# Patient Record
Sex: MALE | Race: ASIAN | NOT HISPANIC OR LATINO | URBAN - METROPOLITAN AREA
[De-identification: names, ages, dates, MRNs, and addresses within clinical notes are randomized per-mention and may not be internally consistent; named-entity substitution may affect disease eponyms.]

---

## 2021-08-03 ENCOUNTER — EMERGENCY (EMERGENCY)
Facility: HOSPITAL | Age: 61
LOS: 1 days | Discharge: HOME | End: 2021-08-03
Attending: STUDENT IN AN ORGANIZED HEALTH CARE EDUCATION/TRAINING PROGRAM | Admitting: STUDENT IN AN ORGANIZED HEALTH CARE EDUCATION/TRAINING PROGRAM
Payer: SELF-PAY

## 2021-08-03 VITALS
OXYGEN SATURATION: 100 % | HEART RATE: 104 BPM | TEMPERATURE: 99 F | SYSTOLIC BLOOD PRESSURE: 116 MMHG | RESPIRATION RATE: 16 BRPM | DIASTOLIC BLOOD PRESSURE: 65 MMHG

## 2021-08-03 VITALS
RESPIRATION RATE: 16 BRPM | DIASTOLIC BLOOD PRESSURE: 76 MMHG | HEART RATE: 95 BPM | SYSTOLIC BLOOD PRESSURE: 125 MMHG | TEMPERATURE: 98 F | OXYGEN SATURATION: 100 %

## 2021-08-03 LAB
ALBUMIN SERPL ELPH-MCNC: 3.4 G/DL — SIGNIFICANT CHANGE UP (ref 3.3–5)
ALP SERPL-CCNC: 75 U/L — SIGNIFICANT CHANGE UP (ref 40–120)
ALT FLD-CCNC: 162 U/L — HIGH (ref 4–41)
ANION GAP SERPL CALC-SCNC: 11 MMOL/L — SIGNIFICANT CHANGE UP (ref 7–14)
AST SERPL-CCNC: 100 U/L — HIGH (ref 4–40)
BASOPHILS # BLD AUTO: 0.04 K/UL — SIGNIFICANT CHANGE UP (ref 0–0.2)
BASOPHILS NFR BLD AUTO: 0.4 % — SIGNIFICANT CHANGE UP (ref 0–2)
BILIRUB SERPL-MCNC: 0.3 MG/DL — SIGNIFICANT CHANGE UP (ref 0.2–1.2)
BUN SERPL-MCNC: 10 MG/DL — SIGNIFICANT CHANGE UP (ref 7–23)
CALCIUM SERPL-MCNC: 9.2 MG/DL — SIGNIFICANT CHANGE UP (ref 8.4–10.5)
CHLORIDE SERPL-SCNC: 97 MMOL/L — LOW (ref 98–107)
CK SERPL-CCNC: 3193 U/L — HIGH (ref 30–200)
CO2 SERPL-SCNC: 26 MMOL/L — SIGNIFICANT CHANGE UP (ref 22–31)
CREAT SERPL-MCNC: 0.69 MG/DL — SIGNIFICANT CHANGE UP (ref 0.5–1.3)
EOSINOPHIL # BLD AUTO: 0.21 K/UL — SIGNIFICANT CHANGE UP (ref 0–0.5)
EOSINOPHIL NFR BLD AUTO: 2.1 % — SIGNIFICANT CHANGE UP (ref 0–6)
GLUCOSE SERPL-MCNC: 78 MG/DL — SIGNIFICANT CHANGE UP (ref 70–99)
HCT VFR BLD CALC: 35.5 % — LOW (ref 39–50)
HGB BLD-MCNC: 11.1 G/DL — LOW (ref 13–17)
IANC: 6.42 K/UL — SIGNIFICANT CHANGE UP (ref 1.5–8.5)
IMM GRANULOCYTES NFR BLD AUTO: 0.4 % — SIGNIFICANT CHANGE UP (ref 0–1.5)
LYMPHOCYTES # BLD AUTO: 2.71 K/UL — SIGNIFICANT CHANGE UP (ref 1–3.3)
LYMPHOCYTES # BLD AUTO: 26.9 % — SIGNIFICANT CHANGE UP (ref 13–44)
MAGNESIUM SERPL-MCNC: 1.8 MG/DL — SIGNIFICANT CHANGE UP (ref 1.6–2.6)
MCHC RBC-ENTMCNC: 27.5 PG — SIGNIFICANT CHANGE UP (ref 27–34)
MCHC RBC-ENTMCNC: 31.3 GM/DL — LOW (ref 32–36)
MCV RBC AUTO: 88.1 FL — SIGNIFICANT CHANGE UP (ref 80–100)
MONOCYTES # BLD AUTO: 0.66 K/UL — SIGNIFICANT CHANGE UP (ref 0–0.9)
MONOCYTES NFR BLD AUTO: 6.5 % — SIGNIFICANT CHANGE UP (ref 2–14)
NEUTROPHILS # BLD AUTO: 6.42 K/UL — SIGNIFICANT CHANGE UP (ref 1.8–7.4)
NEUTROPHILS NFR BLD AUTO: 63.7 % — SIGNIFICANT CHANGE UP (ref 43–77)
NRBC # BLD: 0 /100 WBCS — SIGNIFICANT CHANGE UP
NRBC # FLD: 0 K/UL — SIGNIFICANT CHANGE UP
PHOSPHATE SERPL-MCNC: 4.1 MG/DL — SIGNIFICANT CHANGE UP (ref 2.5–4.5)
PLATELET # BLD AUTO: 296 K/UL — SIGNIFICANT CHANGE UP (ref 150–400)
POTASSIUM SERPL-MCNC: 5 MMOL/L — SIGNIFICANT CHANGE UP (ref 3.5–5.3)
POTASSIUM SERPL-SCNC: 5 MMOL/L — SIGNIFICANT CHANGE UP (ref 3.5–5.3)
PROT SERPL-MCNC: 7.1 G/DL — SIGNIFICANT CHANGE UP (ref 6–8.3)
RBC # BLD: 4.03 M/UL — LOW (ref 4.2–5.8)
RBC # FLD: 13.1 % — SIGNIFICANT CHANGE UP (ref 10.3–14.5)
SODIUM SERPL-SCNC: 134 MMOL/L — LOW (ref 135–145)
TSH SERPL-MCNC: 3.39 UIU/ML — SIGNIFICANT CHANGE UP (ref 0.27–4.2)
WBC # BLD: 10.08 K/UL — SIGNIFICANT CHANGE UP (ref 3.8–10.5)
WBC # FLD AUTO: 10.08 K/UL — SIGNIFICANT CHANGE UP (ref 3.8–10.5)

## 2021-08-03 PROCEDURE — 99284 EMERGENCY DEPT VISIT MOD MDM: CPT | Mod: 25

## 2021-08-03 PROCEDURE — 93010 ELECTROCARDIOGRAM REPORT: CPT

## 2021-08-03 RX ORDER — SODIUM CHLORIDE 9 MG/ML
1000 INJECTION INTRAMUSCULAR; INTRAVENOUS; SUBCUTANEOUS ONCE
Refills: 0 | Status: COMPLETED | OUTPATIENT
Start: 2021-08-03 | End: 2021-08-03

## 2021-08-03 RX ADMIN — SODIUM CHLORIDE 1000 MILLILITER(S): 9 INJECTION INTRAMUSCULAR; INTRAVENOUS; SUBCUTANEOUS at 12:58

## 2021-08-03 NOTE — ED PROVIDER NOTE - CLINICAL SUMMARY MEDICAL DECISION MAKING FREE TEXT BOX
Shameem:     Concern for weight loss, hx of rhabo and weakness. CT abdomen may 2021 neg fo malignancy. Will order basic labs- cbc, cmp, cpk, tsh, mag, phos and ekg.

## 2021-08-03 NOTE — ED PROVIDER NOTE - ATTENDING CONTRIBUTION TO CARE
I have personally performed a history and physical examination of the patient and discussed management with the resident as well as the patient.  I reviewed the resident's note and agree with the documented findings and plan of care.  I have authored and modified critical sections of the Provider Note, including but not limited to HPI, Physical Exam and MDM.    59 yo M with a PMH of IDDM presents to the ED with weight loss and weakness that has been persistent over the last 3 months with associated anorexia. He endorses hunger but states that he cannot eat without further elaborating his symptoms. Approximately 35 lbs weight loss that he was evaluated for these symptoms at another hospital, including presented results for an unremarkable CT PE, CT abd/pelvis with con, and MRI spine, as well as blood work revealing an elevated CPK. Pt was discharged after multiple specialist evaluation at that time with stated "no diagnosis." Pt has not follow up with his outpatient therapy as of this time. He was recommended an outpatient colonoscopy and egd. No other current complaints at this time.    ROS   GENERAL: No fever, chills, +malaise  ENT No earache, coryza, sore throat   NECK: No stiffness swollen glands or dysphagia   RESPIRATORY: No cough, dyspnea, pleuritic chest pain   HEART: no chest pain   ABDOMEN: No abdominal pain, nausea, vomiting or diarrhea   : No dysuria, increase frequency or urgency, No discharge   MUSCULAR-SKELETAL: No myalgia, arthralgia   NEUROLOGY: No headache vertigo, paresthesia, focal deficits, diplopia   SKIN: No rash, abrasion   All other ROS are negative     PHYSICAL EXAM   General: NAD   HEENT: NCAT, PERRLA, EOMI  Resp: CTA b/l No w/r/r   CVS: S1S2, No m/r/g, no pedal edema, no jvd   ABD: Soft and non tender, no rebound, guarding, or rigidity   MS: no muscle tenderness, no deformity   Skin: No rash or evidence of trauma   Neuro: No focal deficit, motor and sensory intact, normal gait    Impression: rhabdomyolysis, malignancy of unknown origin, electrolyte abnormality, dehydration, hypothyroid    Plan:   -cbc, cmp, ck, magnesium, phosphorus, tsh  -pt offered repeat CT chest/abd/pelvis. Given recent negative studies pt has declined repeating the studies today  -ekg screening

## 2021-08-03 NOTE — ED PROVIDER NOTE - NSFOLLOWUPINSTRUCTIONS_ED_ALL_ED_FT
You were seen in the Emergency Department for weight loss and weakness. Blood work was completed on your visit today and these results have been provided to you. You CPK on today's visit was. For this, please continue to drink fluids.     Please keep with your scheduled appointments. Please continue home meds as prescribed.     Seek immediate care if you experience any of the following pr if your current symptoms worsen:   chest pain, palpitations, increased weakness, dizziness, lightheadedness, syncope, nausea, vomiting, fever or chills. You were seen in the Emergency Department for weight loss and weakness. Blood work was completed on your visit today and these results have been provided to you. You CPK, an enzyme in muscle breakdown, on today's visit was. For this, please continue to drink large amounts fluids.     Please continue with your scheduled appointments. Please continue home meds as prescribed.     Seek immediate care if you experience any of the following pr if your current symptoms worsen:   chest pain, palpitations, increased weakness, dizziness, lightheadedness, syncope, nausea, vomiting, fever or chills. You were seen in the Emergency Department for weight loss and weakness. Blood work was completed on your visit today and these results have been provided to you. You CPK, an enzyme signifying muscle breakdown, on today's visit was elevated. For this, please continue to drink large amounts fluids.     Please continue with your scheduled appointments. Follow with your primary care provider as soon as possible. Please continue home meds as prescribed.     Seek immediate care if you experience any of the following or if your current symptoms worsen:   chest pain, palpitations, increased weakness, dizziness, lightheadedness, syncope, nausea, vomiting, fever or chills.

## 2021-08-03 NOTE — ED PROVIDER NOTE - PHYSICAL EXAMINATION
General: Alert and Orientated x 3. No apparent distress.  Head: Normocephalic and atraumatic.  Eyes: PERRLA with EOMI.  Neck: Supple. Trachea midline.   Cardiac: Normal S1 and S2 w/ RRR. No murmurs appreciated. No JVD appreciated.  Pulmonary: Vesicular breath sounds bilaterally. No increased WOB. No wheezes or crackles.  Abdominal: Soft, non-tender. (+) bowel sounds appreciated in all 4 quadrants. No hepatosplenomegaly.   Neurologic: No focal sensory or motor deficits.  Musculoskeletal: Strength appropriate in all 4 extremities for age with no limited ROM.  Skin: Color appropriate for race. Intact, warm, and well-perfused. Scattered area of vitiligo and psoriases.,  Psychiatric: Appropriate mood and affect. No apparent risk to self or others.

## 2021-08-03 NOTE — ED PROVIDER NOTE - PROGRESS NOTE DETAILS
Lab work discussed with the patient including improving CK level compared to presented reports. Patient offered admission to the hospital for further evaluation of symptoms vs outpatient follow up with his current appointments, the soonest in 2 days. The patient states that given his prior CT and MRI in the setting of improving lab values he would like to continue his evaluation as an outpatient. Pt has been tolerating PO food and water without difficulty. Pt hemodynamically stable and medically cleared for discharge at this time.

## 2021-08-03 NOTE — ED PROVIDER NOTE - NS ED ROS FT
CONSTITUTIONAL: See HPI   EYES: no visual changes, no eye pain  EARS: no ear drainage, no ear pain, no change in hearing  NOSE: no nasal congestion  MOUTH/THROAT: no sore throat  CV: No chest pain, no palpitations  RESP: No SOB, no cough  GI: No n/v/d, no abd pain  : no dysuria, no hematuria, no flank pain  MSK: no back pain, no extremity pain  SKIN: no rashes  NEURO: no headache, no focal weakness, no decreased sensation/parasthesias   PSYCHIATRIC: no known mental health issues

## 2021-08-03 NOTE — ED ADULT NURSE NOTE - OBJECTIVE STATEMENT
Pt c/o weight loss of  35 lbs over a few weeks--pt was admitted to another hospital and had an extensive workup which found nothing--pt had #20 placed rt antecubial--labs drawn and sent--pt appears in no acute distress.

## 2021-08-03 NOTE — ED PROVIDER NOTE - PATIENT PORTAL LINK FT
You can access the FollowMyHealth Patient Portal offered by [facility] by registering at the following website: [email or web address]. By joining [employer]s FollowMyHealth portal, you will also be able to view your health information using other applications (apps) compatible with our system.

## 2021-08-03 NOTE — ED PROVIDER NOTE - OBJECTIVE STATEMENT
Patient is a 59 y/o M with a PMH of insulin-dep DM who presents to the ED with weight loss and weakness. Patient states that over the past 2.5 months he Patient is a 59 y/o M with a PMH of insulin-dep DM who presents to the ED with weight loss and weakness. Patient states that over the past 2.5 months he has lost 35 lbs. Losing approx 3-4 lbs a week. States that around 3 months ago Patient is a 61 y/o M with a PMH of insulin-dep DM who presents to the ED with weight loss and weakness. Patient states that over the past 2.5 months he has lost 35 lbs. Losing approx 3-4 lbs a week. Patient also c/o weakness that is mainly at his "abdominal core". This also began about 2.5 months ago. Associated with decreased PO intake but patient feels hunger. Denies numbness or tingling. States that around 3 months ago was admitted to hospital in [place] for "allergies" and sore throat. Ended up having a cardiac work up which was negative. Four days later patient was called back to the hospital for "abnormal labs". Was tx for rhabdo as CPK >4000. Patient was seen by several specialists including GI, Neurology, ad Rhuem. Had neg CT abd/pelvic, CT chest angio, tb quantiferon. Normal rhuem work up including SAMIA, Scleroderma, centromere, ceruoplasmin, anti-trypsin, anti-smith, anti-ribosome, aldolase,. Trops consistently elevated during hospitalization. No colonocscopy, declined egd. No fever, chills, night sweats, nausea, vomiting, bleeding issues. No localized abdominal pain. Patient adjusts insulin based on BS. Denies elevated BS recently.

## 2021-08-03 NOTE — ED ADULT TRIAGE NOTE - CHIEF COMPLAINT QUOTE
Pt c/o increasing weight loss,  approx 35 lbs, SOB on exertion, and  weakness  x 2 mths.  Denies chest pain, Pt c/o increasing weight loss,  approx 35 lbs, SOB on exertion, and  weakness  x 2 mths.  Denies chest pain,.   Pt with hx of type 2 DM

## 2021-08-03 NOTE — ED ADULT NURSE NOTE - CHIEF COMPLAINT QUOTE
Pt c/o increasing weight loss,  approx 35 lbs, SOB on exertion, and  weakness  x 2 mths.  Denies chest pain,.   Pt with hx of type 2 DM

## 2021-08-30 ENCOUNTER — INPATIENT (INPATIENT)
Facility: HOSPITAL | Age: 61
LOS: 9 days | Discharge: HOME | DRG: 500 | End: 2021-09-09
Attending: HOSPITALIST | Admitting: HOSPITALIST
Payer: MEDICAID

## 2021-08-30 VITALS
HEIGHT: 68 IN | RESPIRATION RATE: 18 BRPM | SYSTOLIC BLOOD PRESSURE: 110 MMHG | WEIGHT: 132.94 LBS | TEMPERATURE: 99 F | DIASTOLIC BLOOD PRESSURE: 73 MMHG | HEART RATE: 104 BPM | OXYGEN SATURATION: 99 %

## 2021-08-30 DIAGNOSIS — R13.10 DYSPHAGIA, UNSPECIFIED: ICD-10-CM

## 2021-08-30 LAB
ALBUMIN SERPL ELPH-MCNC: 3 G/DL — LOW (ref 3.3–5)
ALP SERPL-CCNC: 81 U/L — SIGNIFICANT CHANGE UP (ref 40–120)
ALT FLD-CCNC: 192 U/L — HIGH (ref 10–45)
ANION GAP SERPL CALC-SCNC: 12 MMOL/L — SIGNIFICANT CHANGE UP (ref 5–17)
APPEARANCE UR: CLEAR — SIGNIFICANT CHANGE UP
AST SERPL-CCNC: 139 U/L — HIGH (ref 10–40)
BACTERIA # UR AUTO: NEGATIVE — SIGNIFICANT CHANGE UP
BASOPHILS # BLD AUTO: 0.04 K/UL — SIGNIFICANT CHANGE UP (ref 0–0.2)
BASOPHILS NFR BLD AUTO: 0.2 % — SIGNIFICANT CHANGE UP (ref 0–2)
BILIRUB SERPL-MCNC: 0.2 MG/DL — SIGNIFICANT CHANGE UP (ref 0.2–1.2)
BILIRUB UR-MCNC: NEGATIVE — SIGNIFICANT CHANGE UP
BUN SERPL-MCNC: 16 MG/DL — SIGNIFICANT CHANGE UP (ref 7–23)
CALCIUM SERPL-MCNC: 9.2 MG/DL — SIGNIFICANT CHANGE UP (ref 8.4–10.5)
CHLORIDE SERPL-SCNC: 94 MMOL/L — LOW (ref 96–108)
CK MB CFR SERPL CALC: 167.5 NG/ML — HIGH (ref 0–6.7)
CO2 SERPL-SCNC: 27 MMOL/L — SIGNIFICANT CHANGE UP (ref 22–31)
COLOR SPEC: SIGNIFICANT CHANGE UP
CREAT SERPL-MCNC: 0.54 MG/DL — SIGNIFICANT CHANGE UP (ref 0.5–1.3)
DIFF PNL FLD: ABNORMAL
EOSINOPHIL # BLD AUTO: 0.05 K/UL — SIGNIFICANT CHANGE UP (ref 0–0.5)
EOSINOPHIL NFR BLD AUTO: 0.3 % — SIGNIFICANT CHANGE UP (ref 0–6)
EPI CELLS # UR: 0 /HPF — SIGNIFICANT CHANGE UP
GLUCOSE SERPL-MCNC: 160 MG/DL — HIGH (ref 70–99)
GLUCOSE UR QL: NEGATIVE — SIGNIFICANT CHANGE UP
HCT VFR BLD CALC: 33.4 % — LOW (ref 39–50)
HGB BLD-MCNC: 10.4 G/DL — LOW (ref 13–17)
IMM GRANULOCYTES NFR BLD AUTO: 0.6 % — SIGNIFICANT CHANGE UP (ref 0–1.5)
KETONES UR-MCNC: NEGATIVE — SIGNIFICANT CHANGE UP
LDH SERPL L TO P-CCNC: 598 U/L — HIGH (ref 50–242)
LEUKOCYTE ESTERASE UR-ACNC: NEGATIVE — SIGNIFICANT CHANGE UP
LYMPHOCYTES # BLD AUTO: 1.61 K/UL — SIGNIFICANT CHANGE UP (ref 1–3.3)
LYMPHOCYTES # BLD AUTO: 9.3 % — LOW (ref 13–44)
MAGNESIUM SERPL-MCNC: 2 MG/DL — SIGNIFICANT CHANGE UP (ref 1.6–2.6)
MCHC RBC-ENTMCNC: 26.9 PG — LOW (ref 27–34)
MCHC RBC-ENTMCNC: 31.1 GM/DL — LOW (ref 32–36)
MCV RBC AUTO: 86.5 FL — SIGNIFICANT CHANGE UP (ref 80–100)
MONOCYTES # BLD AUTO: 0.51 K/UL — SIGNIFICANT CHANGE UP (ref 0–0.9)
MONOCYTES NFR BLD AUTO: 3 % — SIGNIFICANT CHANGE UP (ref 2–14)
NEUTROPHILS # BLD AUTO: 14.91 K/UL — HIGH (ref 1.8–7.4)
NEUTROPHILS NFR BLD AUTO: 86.6 % — HIGH (ref 43–77)
NITRITE UR-MCNC: NEGATIVE — SIGNIFICANT CHANGE UP
NRBC # BLD: 0 /100 WBCS — SIGNIFICANT CHANGE UP (ref 0–0)
PH UR: 6 — SIGNIFICANT CHANGE UP (ref 5–8)
PHOSPHATE SERPL-MCNC: 4 MG/DL — SIGNIFICANT CHANGE UP (ref 2.5–4.5)
PLATELET # BLD AUTO: 408 K/UL — HIGH (ref 150–400)
POTASSIUM SERPL-MCNC: 4.6 MMOL/L — SIGNIFICANT CHANGE UP (ref 3.5–5.3)
POTASSIUM SERPL-SCNC: 4.6 MMOL/L — SIGNIFICANT CHANGE UP (ref 3.5–5.3)
PROT SERPL-MCNC: 7.3 G/DL — SIGNIFICANT CHANGE UP (ref 6–8.3)
PROT UR-MCNC: ABNORMAL
RBC # BLD: 3.86 M/UL — LOW (ref 4.2–5.8)
RBC # FLD: 12.7 % — SIGNIFICANT CHANGE UP (ref 10.3–14.5)
RBC CASTS # UR COMP ASSIST: 1 /HPF — SIGNIFICANT CHANGE UP (ref 0–4)
SARS-COV-2 RNA SPEC QL NAA+PROBE: SIGNIFICANT CHANGE UP
SODIUM SERPL-SCNC: 133 MMOL/L — LOW (ref 135–145)
SP GR SPEC: 1.01 — LOW (ref 1.01–1.02)
TROPONIN T, HIGH SENSITIVITY RESULT: 1022 NG/L — HIGH (ref 0–51)
TROPONIN T, HIGH SENSITIVITY RESULT: 991 NG/L — HIGH (ref 0–51)
UROBILINOGEN FLD QL: NEGATIVE — SIGNIFICANT CHANGE UP
WBC # BLD: 17.23 K/UL — HIGH (ref 3.8–10.5)
WBC # FLD AUTO: 17.23 K/UL — HIGH (ref 3.8–10.5)
WBC UR QL: 0 /HPF — SIGNIFICANT CHANGE UP (ref 0–5)

## 2021-08-30 PROCEDURE — 93010 ELECTROCARDIOGRAM REPORT: CPT

## 2021-08-30 PROCEDURE — 99285 EMERGENCY DEPT VISIT HI MDM: CPT

## 2021-08-30 PROCEDURE — 71045 X-RAY EXAM CHEST 1 VIEW: CPT | Mod: 26

## 2021-08-30 RX ORDER — SODIUM CHLORIDE 9 MG/ML
1000 INJECTION, SOLUTION INTRAVENOUS ONCE
Refills: 0 | Status: COMPLETED | OUTPATIENT
Start: 2021-08-30 | End: 2021-08-30

## 2021-08-30 RX ADMIN — SODIUM CHLORIDE 1000 MILLILITER(S): 9 INJECTION, SOLUTION INTRAVENOUS at 21:44

## 2021-08-30 NOTE — ED ADULT NURSE NOTE - OBJECTIVE STATEMENT
62 y/o male with PMH DM presenting to ED for generalized weakness, TIJERINA, 40lb weight loss in 3 months, difficulty swallowing. Pt reports that he has had difficulty swallowing that started in April. Upon exam pt A&Ox3 gross neuro intact, lungs cta bilaterally, no difficulty speaking in complete sentences, s1s2 heart sounds heard, pulses x 4, alexander x4, abdomen soft nontender nondistended, skin intact, 5/5 strength in all extremities but pt reports that he cannot pull himself up in stretcher/ has difficulty ambulating. pt denies chest pain, sob, ha, n/v/d, abdominal pain, f/c, urinary symptoms, hematuria. 20 gauge IV placed to right a/c, labs drawn & sent. placed on cardiac monitor NSR 90s.

## 2021-08-30 NOTE — ED CLERICAL - NS ED CLERK NOTE PRE-ARRIVAL INFORMATION; ADDITIONAL PRE-ARRIVAL INFORMATION
CC/Reason For referral: 40lb wt loss over 3 months , c/o sob, weakness,inability to walk and difficulty swallowing.  increased trop and cpk  Preferred Consultant(if applicable): cardiology, GI   Who admits for you (if needed): hospitalist  Do you have documents you would like to fax over? no  Would you still like to speak to an ED attending? no

## 2021-08-30 NOTE — ED PROVIDER NOTE - OBJECTIVE STATEMENT
61 M hx of DM, presents as referral from PMD for pain with swallowing, weight loss. endorses 40lb weight loss over the last 3 months. pt has pain with swallowing of solids and liquids. Per PMD note, pt with TIJERINA and elevated trop, CPK, and LDH. Pt with CT in May showing colon thickening and awaiting colonoscopy. Pt currently endorses pain with swallowing and no other complaints, denies fevers, nausea/vomiting, abdominal pain.

## 2021-08-30 NOTE — CHART NOTE - NSCHARTNOTEFT_GEN_A_CORE
Vascular Cardiology Chart Note (full note to follow)    Patient was referred for LHC with Dr. Michele due to prior recent admissions to outside public hospitals with TIJERINA, Troponin I 0.4, Sinus Tach, CPK 4000, LDH, aldolase elevated, rhabdomyolysis.   Reports rapid recent acute physical deterioration (unintentional weight loss 40 pounds in 2-3 months, new dysphagia, muscular atrophy, now unable to stand or walk w/o assistance).  Colon thickening on CT Chest/Abd/Pelvis. Colonoscopy on hold due to need for LHC.     1. Recommend admission to tele-medicine due to rapid recent deterioration.  2. Check cardiac enzymes       CPK, labs for myositis / auto-immune disorders       PSA for CA screening.  3. Echocardiogram.  4. Will need dysphagia evaluation (GI, ENT, speech and swallow).  5. Consider Rheum consultation pending labs.  6. Discontinue Janumet.  7. Admitting team to review paperwork with patient of outpatient records from [facility].      Last pan CT in 5/2021. Consider repeat imaging.   8. Will follow with you.      LHC on hold, pending further evaluation.    Thank you,  JUAN Witt, MPAS  Vascular Cardiology   [phone] Vascular Cardiology Chart Note (full note to follow)    Patient was referred for LHC with Dr. Michele due to prior recent admissions to outside public hospitals with TIJERINA, Troponin I 0.4, Sinus Tach, CPK 4000, LDH, aldolase elevated, rhabdomyolysis.     Reports rapid recent acute physical deterioration (unintentional weight loss 40 pounds in 2-3 months, new dysphagia, muscular atrophy, now unable to stand or walk w/o assistance).  Colon thickening on CT Chest/Abd/Pelvis. Colonoscopy on hold due to need for LHC.     1. Recommend admission to tele-medicine due to rapid recent deterioration.  2. Check cardiac enzymes       CPK, labs for myositis / auto-immune disorders       PSA for CA screening.  3. Echocardiogram.  4. Will need dysphagia evaluation (GI, ENT, speech and swallow).  5. Consider Rheum consultation pending labs.  6. Discontinue Janumet.  7. Admitting team to review paperwork with patient of outpatient records from [facility].      Last pan CT in 5/2021. Consider repeat imaging.   8. Will follow with you.      LHC on hold, pending further evaluation.    Thank you,  JUAN Witt, MPAS  Vascular Cardiology   [phone] Vascular Cardiology Chart Note (full note to follow)    Patient was referred for LHC with Dr. Michele due to prior recent admissions to outside public hospitals with TIJERINA, Troponin I 0.4, Sinus Tach, CPK 4000, LDH, aldolase elevated, rhabdomyolysis.     Reports rapid recent acute physical deterioration (unintentional weight loss 40 pounds in 2-3 months, new dysphagia, muscular atrophy, now unable to stand or walk w/o assistance).  Colon thickening on CT Chest/Abd/Pelvis. Colonoscopy on hold due to need for LHC.     1. Recommend admission to tele-medicine due to rapid recent deterioration.  2. Check cardiac enzymes       CPK, labs for myositis / auto-immune disorders       PSA for CA screening.  3. Echocardiogram.  4. Will need dysphagia evaluation (GI, ENT, speech and swallow).  5. Consider Rheum consultation pending labs.  6. Discontinue Janumet.  7. Admitting team to review paperwork with patient of outpatient records from [facility].      Last pan CT in 5/2021. Consider repeat imaging.   8. Will follow with you.      LHC on hold, pending further evaluation.    Thank you,  JUAN Witt, MPAS  Vascular Cardiology   [phone]    Agree with recommendation as noted above.  Full consultation note to follow.

## 2021-08-30 NOTE — ED PROVIDER NOTE - NS ED ROS FT
Gen: Denies fevers  HEENT: + odynophagia  CV: Denies chest pain  Resp: +TIJERINA  GI: Denies nausea, vomiting, hematochezia  : Denies dysuria, hematuria  all other ROS negative unless indicated in HPI

## 2021-08-30 NOTE — ED PROVIDER NOTE - RAPID ASSESSMENT
61 M presents as referral from PMD for admission to tele for 40lb weight loss over the last 3 months. Pt also with difficulty swallowing. Per PMD note, pt with TIJERINA and elevated trop, CPK, and LDH. Pt with CT in May showing colon thickening and awaiting colonoscopy.     Patient was seen as a tele QDOC patient. The patient will be seen and further worked up in the main emergency department and their care will be completed by the main emergency department team along with a thorough physical exam. Receiving team will follow up on labs, analgesia, any clinical imaging, reassess and disposition as clinically indicated, all decisions regarding the progression of care will be made at their discretion.    Scribe Statement: Chauncey MCKENZIE, attest that this documentation has been prepared under the direction and in the presence of Gabino Oneil) 61 M presents as referral from PMD for admission to tele for 40lb weight loss over the last 3 months. Pt also with difficulty swallowing. Per PMD note, pt with TIJERINA and elevated trop, CPK, and LDH. Pt with CT in May showing colon thickening and awaiting colonoscopy.     Patient was seen as a tele QDOC patient. The patient will be seen and further worked up in the main emergency department and their care will be completed by the main emergency department team along with a thorough physical exam. Receiving team will follow up on labs, analgesia, any clinical imaging, reassess and disposition as clinically indicated, all decisions regarding the progression of care will be made at their discretion.    Scribe Statement: I, Chauncey Riojas, attest that this documentation has been prepared under the direction and in the presence of Gabino Oneil)    Chart entered by boo, reviewed and edited/corrected as needed by myself.  Gabino Oneil M.D.

## 2021-08-30 NOTE — ED PROVIDER NOTE - PHYSICAL EXAMINATION
Gen: WDWN, NAD  HEENT: EOMI, no nasal discharge, mucous membranes moist  CV: RRR, +S1/S2, no M/R/G  Resp: CTAB, no W/R/R, no accessory muscle use, no increased work of breathing  GI: Abdomen soft non-distended, NTTP  MSK: No open wounds, no bruising, no LE edema  Neuro: A&Ox4, following commands, moving all four extremities spontaneously  Psych: appropriate mood

## 2021-08-30 NOTE — ED PROVIDER NOTE - CLINICAL SUMMARY MEDICAL DECISION MAKING FREE TEXT BOX
Floresita Teran MD, PGY-2: 61 M hx of DM, presents as referral from PMD for pain with swallowing, weight loss (40lb weight loss over the last 3 months). outpatient imaging showing elevated trop, CPK, and LDH. unclear if dysphagia is oropharyngeal vs. esophageal; suspect tumor in setting of weight loss. plan to repeat outpatient labs, consider scope or direct laryngoscopy on admission. for TIJERINA, suspect CHF, ACS, plan for cardiac workup. admission likely in setting of multiple lab abnormalities and complaints requiring further testing. 61 M hx of DM, presents as referral from PMD for pain with swallowing, weight loss (40lb weight loss over the last 3 months). outpatient imaging showing elevated trop, CPK, and LDH, CT outpatient with colonic thickening. unclear if dysphagia is oropharyngeal vs. esophageal; suspect tumor in setting of weight loss, WEB, zenker diverticulum, Schatzki ring, autoimmune disorder. plan to repeat outpatient labs, consider scope or direct laryngoscopy on admission. ZR

## 2021-08-31 DIAGNOSIS — D64.9 ANEMIA, UNSPECIFIED: ICD-10-CM

## 2021-08-31 DIAGNOSIS — E46 UNSPECIFIED PROTEIN-CALORIE MALNUTRITION: ICD-10-CM

## 2021-08-31 DIAGNOSIS — M60.9 MYOSITIS, UNSPECIFIED: ICD-10-CM

## 2021-08-31 DIAGNOSIS — K63.9 DISEASE OF INTESTINE, UNSPECIFIED: ICD-10-CM

## 2021-08-31 DIAGNOSIS — Z98.890 OTHER SPECIFIED POSTPROCEDURAL STATES: Chronic | ICD-10-CM

## 2021-08-31 DIAGNOSIS — R77.8 OTHER SPECIFIED ABNORMALITIES OF PLASMA PROTEINS: ICD-10-CM

## 2021-08-31 DIAGNOSIS — E11.9 TYPE 2 DIABETES MELLITUS WITHOUT COMPLICATIONS: ICD-10-CM

## 2021-08-31 DIAGNOSIS — Z29.9 ENCOUNTER FOR PROPHYLACTIC MEASURES, UNSPECIFIED: ICD-10-CM

## 2021-08-31 DIAGNOSIS — R13.10 DYSPHAGIA, UNSPECIFIED: ICD-10-CM

## 2021-08-31 LAB
A1C WITH ESTIMATED AVERAGE GLUCOSE RESULT: 7.2 % — HIGH (ref 4–5.6)
ALBUMIN SERPL ELPH-MCNC: 2.4 G/DL — LOW (ref 3.3–5)
ALP SERPL-CCNC: 73 U/L — SIGNIFICANT CHANGE UP (ref 40–120)
ALT FLD-CCNC: 155 U/L — HIGH (ref 10–45)
ANION GAP SERPL CALC-SCNC: 9 MMOL/L — SIGNIFICANT CHANGE UP (ref 5–17)
APTT BLD: 27.4 SEC — LOW (ref 27.5–35.5)
AST SERPL-CCNC: 111 U/L — HIGH (ref 10–40)
BASOPHILS # BLD AUTO: 0.04 K/UL — SIGNIFICANT CHANGE UP (ref 0–0.2)
BASOPHILS NFR BLD AUTO: 0.3 % — SIGNIFICANT CHANGE UP (ref 0–2)
BILIRUB SERPL-MCNC: 0.2 MG/DL — SIGNIFICANT CHANGE UP (ref 0.2–1.2)
BUN SERPL-MCNC: 11 MG/DL — SIGNIFICANT CHANGE UP (ref 7–23)
CALCIUM SERPL-MCNC: 8.8 MG/DL — SIGNIFICANT CHANGE UP (ref 8.4–10.5)
CHLORIDE SERPL-SCNC: 97 MMOL/L — SIGNIFICANT CHANGE UP (ref 96–108)
CK MB BLD-MCNC: 4.9 % — HIGH (ref 0–3.5)
CK MB CFR SERPL CALC: 153.1 NG/ML — HIGH (ref 0–6.7)
CK SERPL-CCNC: 3115 U/L — HIGH (ref 30–200)
CO2 SERPL-SCNC: 27 MMOL/L — SIGNIFICANT CHANGE UP (ref 22–31)
CREAT SERPL-MCNC: 0.53 MG/DL — SIGNIFICANT CHANGE UP (ref 0.5–1.3)
EBV EA AB SER IA-ACNC: 6.7 U/ML — SIGNIFICANT CHANGE UP
EBV EA AB TITR SER IF: POSITIVE
EBV EA IGG SER-ACNC: NEGATIVE — SIGNIFICANT CHANGE UP
EBV NA IGG SER IA-ACNC: 208 U/ML — HIGH
EBV PATRN SPEC IB-IMP: SIGNIFICANT CHANGE UP
EBV VCA IGG AVIDITY SER QL IA: POSITIVE
EBV VCA IGM SER IA-ACNC: 80.8 U/ML — HIGH
EBV VCA IGM SER IA-ACNC: <10 U/ML — SIGNIFICANT CHANGE UP
EBV VCA IGM TITR FLD: NEGATIVE — SIGNIFICANT CHANGE UP
EOSINOPHIL # BLD AUTO: 0.06 K/UL — SIGNIFICANT CHANGE UP (ref 0–0.5)
EOSINOPHIL NFR BLD AUTO: 0.5 % — SIGNIFICANT CHANGE UP (ref 0–6)
ERYTHROCYTE [SEDIMENTATION RATE] IN BLOOD: 107 MM/HR — HIGH (ref 0–20)
ESTIMATED AVERAGE GLUCOSE: 160 MG/DL — HIGH (ref 68–114)
FERRITIN SERPL-MCNC: 538 NG/ML — HIGH (ref 30–400)
FOLATE SERPL-MCNC: 15 NG/ML — SIGNIFICANT CHANGE UP
GLUCOSE BLDC GLUCOMTR-MCNC: 106 MG/DL — HIGH (ref 70–99)
GLUCOSE BLDC GLUCOMTR-MCNC: 116 MG/DL — HIGH (ref 70–99)
GLUCOSE BLDC GLUCOMTR-MCNC: 125 MG/DL — HIGH (ref 70–99)
GLUCOSE BLDC GLUCOMTR-MCNC: 131 MG/DL — HIGH (ref 70–99)
GLUCOSE BLDC GLUCOMTR-MCNC: 136 MG/DL — HIGH (ref 70–99)
GLUCOSE BLDC GLUCOMTR-MCNC: 97 MG/DL — SIGNIFICANT CHANGE UP (ref 70–99)
GLUCOSE SERPL-MCNC: 139 MG/DL — HIGH (ref 70–99)
HAPTOGLOB SERPL-MCNC: 434 MG/DL — HIGH (ref 34–200)
HAV IGM SER-ACNC: SIGNIFICANT CHANGE UP
HBV CORE IGM SER-ACNC: SIGNIFICANT CHANGE UP
HBV SURFACE AG SER-ACNC: SIGNIFICANT CHANGE UP
HCT VFR BLD CALC: 30.5 % — LOW (ref 39–50)
HCV AB S/CO SERPL IA: 0.12 S/CO — SIGNIFICANT CHANGE UP (ref 0–0.99)
HCV AB SERPL-IMP: SIGNIFICANT CHANGE UP
HGB BLD-MCNC: 9.7 G/DL — LOW (ref 13–17)
HIV 1+2 AB+HIV1 P24 AG SERPL QL IA: SIGNIFICANT CHANGE UP
IMM GRANULOCYTES NFR BLD AUTO: 0.5 % — SIGNIFICANT CHANGE UP (ref 0–1.5)
INR BLD: 1.19 RATIO — HIGH (ref 0.88–1.16)
IRON SATN MFR SERPL: 13 UG/DL — LOW (ref 45–165)
IRON SATN MFR SERPL: 8 % — LOW (ref 16–55)
LYMPHOCYTES # BLD AUTO: 1.68 K/UL — SIGNIFICANT CHANGE UP (ref 1–3.3)
LYMPHOCYTES # BLD AUTO: 13 % — SIGNIFICANT CHANGE UP (ref 13–44)
MCHC RBC-ENTMCNC: 27.2 PG — SIGNIFICANT CHANGE UP (ref 27–34)
MCHC RBC-ENTMCNC: 31.8 GM/DL — LOW (ref 32–36)
MCV RBC AUTO: 85.4 FL — SIGNIFICANT CHANGE UP (ref 80–100)
MONOCYTES # BLD AUTO: 0.42 K/UL — SIGNIFICANT CHANGE UP (ref 0–0.9)
MONOCYTES NFR BLD AUTO: 3.2 % — SIGNIFICANT CHANGE UP (ref 2–14)
NEUTROPHILS # BLD AUTO: 10.71 K/UL — HIGH (ref 1.8–7.4)
NEUTROPHILS NFR BLD AUTO: 82.5 % — HIGH (ref 43–77)
NRBC # BLD: 0 /100 WBCS — SIGNIFICANT CHANGE UP (ref 0–0)
PLATELET # BLD AUTO: 369 K/UL — SIGNIFICANT CHANGE UP (ref 150–400)
POTASSIUM SERPL-MCNC: 4.5 MMOL/L — SIGNIFICANT CHANGE UP (ref 3.5–5.3)
POTASSIUM SERPL-SCNC: 4.5 MMOL/L — SIGNIFICANT CHANGE UP (ref 3.5–5.3)
PROT SERPL-MCNC: 5.7 G/DL — LOW (ref 6–8.3)
PROT SERPL-MCNC: 5.7 G/DL — LOW (ref 6–8.3)
PROT SERPL-MCNC: 6.3 G/DL — SIGNIFICANT CHANGE UP (ref 6–8.3)
PROTHROM AB SERPL-ACNC: 14.2 SEC — HIGH (ref 10.6–13.6)
RBC # BLD: 3.57 M/UL — LOW (ref 4.2–5.8)
RBC # BLD: 3.57 M/UL — LOW (ref 4.2–5.8)
RBC # FLD: 12.6 % — SIGNIFICANT CHANGE UP (ref 10.3–14.5)
RETICS #: 43.6 K/UL — SIGNIFICANT CHANGE UP (ref 25–125)
RETICS/RBC NFR: 1.2 % — SIGNIFICANT CHANGE UP (ref 0.5–2.5)
RHEUMATOID FACT SERPL-ACNC: 12 IU/ML — SIGNIFICANT CHANGE UP (ref 0–13)
SODIUM SERPL-SCNC: 133 MMOL/L — LOW (ref 135–145)
TIBC SERPL-MCNC: 164 UG/DL — LOW (ref 220–430)
TROPONIN T, HIGH SENSITIVITY RESULT: 1191 NG/L — HIGH (ref 0–51)
TSH SERPL-MCNC: 2.51 UIU/ML — SIGNIFICANT CHANGE UP (ref 0.27–4.2)
TSH SERPL-MCNC: 2.76 UIU/ML — SIGNIFICANT CHANGE UP (ref 0.27–4.2)
UIBC SERPL-MCNC: 151 UG/DL — SIGNIFICANT CHANGE UP (ref 110–370)
VIT B12 SERPL-MCNC: 1366 PG/ML — HIGH (ref 232–1245)
WBC # BLD: 12.97 K/UL — HIGH (ref 3.8–10.5)
WBC # FLD AUTO: 12.97 K/UL — HIGH (ref 3.8–10.5)

## 2021-08-31 PROCEDURE — 74220 X-RAY XM ESOPHAGUS 1CNTRST: CPT | Mod: 26

## 2021-08-31 PROCEDURE — 74177 CT ABD & PELVIS W/CONTRAST: CPT | Mod: 26

## 2021-08-31 PROCEDURE — 93458 L HRT ARTERY/VENTRICLE ANGIO: CPT | Mod: 26

## 2021-08-31 PROCEDURE — 99223 1ST HOSP IP/OBS HIGH 75: CPT | Mod: GC

## 2021-08-31 PROCEDURE — 93010 ELECTROCARDIOGRAM REPORT: CPT

## 2021-08-31 PROCEDURE — 99223 1ST HOSP IP/OBS HIGH 75: CPT

## 2021-08-31 PROCEDURE — 12345: CPT | Mod: NC,GC

## 2021-08-31 PROCEDURE — 93306 TTE W/DOPPLER COMPLETE: CPT | Mod: 26

## 2021-08-31 PROCEDURE — 99152 MOD SED SAME PHYS/QHP 5/>YRS: CPT

## 2021-08-31 PROCEDURE — 71260 CT THORAX DX C+: CPT | Mod: 26

## 2021-08-31 RX ORDER — INSULIN LISPRO 100/ML
VIAL (ML) SUBCUTANEOUS
Refills: 0 | Status: DISCONTINUED | OUTPATIENT
Start: 2021-08-31 | End: 2021-08-31

## 2021-08-31 RX ORDER — INSULIN LISPRO 100/ML
VIAL (ML) SUBCUTANEOUS EVERY 6 HOURS
Refills: 0 | Status: DISCONTINUED | OUTPATIENT
Start: 2021-08-31 | End: 2021-09-01

## 2021-08-31 RX ORDER — SODIUM CHLORIDE 9 MG/ML
1000 INJECTION, SOLUTION INTRAVENOUS
Refills: 0 | Status: DISCONTINUED | OUTPATIENT
Start: 2021-08-31 | End: 2021-08-31

## 2021-08-31 RX ORDER — THIAMINE MONONITRATE (VIT B1) 100 MG
100 TABLET ORAL DAILY
Refills: 0 | Status: DISCONTINUED | OUTPATIENT
Start: 2021-08-31 | End: 2021-09-09

## 2021-08-31 RX ORDER — DEXTROSE 50 % IN WATER 50 %
25 SYRINGE (ML) INTRAVENOUS ONCE
Refills: 0 | Status: DISCONTINUED | OUTPATIENT
Start: 2021-08-31 | End: 2021-09-09

## 2021-08-31 RX ORDER — SODIUM CHLORIDE 9 MG/ML
1000 INJECTION, SOLUTION INTRAVENOUS
Refills: 0 | Status: DISCONTINUED | OUTPATIENT
Start: 2021-08-31 | End: 2021-09-01

## 2021-08-31 RX ORDER — ASPIRIN/CALCIUM CARB/MAGNESIUM 324 MG
81 TABLET ORAL DAILY
Refills: 0 | Status: DISCONTINUED | OUTPATIENT
Start: 2021-08-31 | End: 2021-09-09

## 2021-08-31 RX ORDER — THIAMINE MONONITRATE (VIT B1) 100 MG
100 TABLET ORAL ONCE
Refills: 0 | Status: COMPLETED | OUTPATIENT
Start: 2021-08-31 | End: 2021-09-01

## 2021-08-31 RX ORDER — SODIUM CHLORIDE 9 MG/ML
1000 INJECTION, SOLUTION INTRAVENOUS
Refills: 0 | Status: DISCONTINUED | OUTPATIENT
Start: 2021-08-31 | End: 2021-09-09

## 2021-08-31 RX ORDER — DEXTROSE 50 % IN WATER 50 %
12.5 SYRINGE (ML) INTRAVENOUS ONCE
Refills: 0 | Status: DISCONTINUED | OUTPATIENT
Start: 2021-08-31 | End: 2021-09-09

## 2021-08-31 RX ORDER — INSULIN GLARGINE 100 [IU]/ML
5 INJECTION, SOLUTION SUBCUTANEOUS AT BEDTIME
Refills: 0 | Status: DISCONTINUED | OUTPATIENT
Start: 2021-08-31 | End: 2021-09-02

## 2021-08-31 RX ORDER — INSULIN LISPRO 100/ML
VIAL (ML) SUBCUTANEOUS AT BEDTIME
Refills: 0 | Status: DISCONTINUED | OUTPATIENT
Start: 2021-08-31 | End: 2021-08-31

## 2021-08-31 RX ORDER — GLUCAGON INJECTION, SOLUTION 0.5 MG/.1ML
1 INJECTION, SOLUTION SUBCUTANEOUS ONCE
Refills: 0 | Status: DISCONTINUED | OUTPATIENT
Start: 2021-08-31 | End: 2021-09-09

## 2021-08-31 RX ORDER — DEXTROSE 50 % IN WATER 50 %
15 SYRINGE (ML) INTRAVENOUS ONCE
Refills: 0 | Status: DISCONTINUED | OUTPATIENT
Start: 2021-08-31 | End: 2021-09-09

## 2021-08-31 RX ORDER — ENOXAPARIN SODIUM 100 MG/ML
40 INJECTION SUBCUTANEOUS DAILY
Refills: 0 | Status: DISCONTINUED | OUTPATIENT
Start: 2021-08-31 | End: 2021-09-01

## 2021-08-31 RX ADMIN — SODIUM CHLORIDE 70 MILLILITER(S): 9 INJECTION, SOLUTION INTRAVENOUS at 21:31

## 2021-08-31 RX ADMIN — INSULIN GLARGINE 5 UNIT(S): 100 INJECTION, SOLUTION SUBCUTANEOUS at 21:31

## 2021-08-31 NOTE — PROGRESS NOTE ADULT - SUBJECTIVE AND OBJECTIVE BOX
KARLA LORENZ  61y  MRN: [id number]    Subjective:    Patient is a 61y old  Male who presents with a chief complaint of Myositis (31 Aug 2021 09:28)      Interval history/overnight events:      MEDICATIONS  (STANDING):  aspirin enteric coated 81 milliGRAM(s) Oral daily  dextrose 40% Gel 15 Gram(s) Oral once  dextrose 5%. 1000 milliLiter(s) (50 mL/Hr) IV Continuous <Continuous>  dextrose 5%. 1000 milliLiter(s) (100 mL/Hr) IV Continuous <Continuous>  dextrose 50% Injectable 25 Gram(s) IV Push once  dextrose 50% Injectable 12.5 Gram(s) IV Push once  dextrose 50% Injectable 25 Gram(s) IV Push once  enoxaparin Injectable 40 milliGRAM(s) SubCutaneous daily  glucagon  Injectable 1 milliGRAM(s) IntraMuscular once  insulin glargine Injectable (LANTUS) 5 Unit(s) SubCutaneous at bedtime  insulin lispro (ADMELOG) corrective regimen sliding scale   SubCutaneous three times a day before meals  insulin lispro (ADMELOG) corrective regimen sliding scale   SubCutaneous at bedtime  multivitamin 1 Tablet(s) Oral daily  thiamine 100 milliGRAM(s) Oral daily    MEDICATIONS  (PRN):        Objective:    Vitals: Vital Signs Last 24 Hrs  T(C): 36.8 (21 @ 08:48), Max: 37.2 (21 @ 01:20)  T(F): 98.2 (21 @ 08:48), Max: 99 (21 @ 01:20)  HR: 99 (21 @ 08:48) (92 - 111)  BP: 91/56 (21 @ 08:48) (91/56 - 137/75)  BP(mean): --  RR: 17 (21 @ 08:48) (16 - 18)  SpO2: 96% (21 @ 08:48) (96% - 100%)            I&O's Summary      PHYSICAL EXAM:  GENERAL: NAD  HEENT: PERRL, no scleral icterus, no head and neck lad   CHEST/LUNG: CTAB, no wheezing, crackles, or ronchi   HEART: RRR, normal S1, S2, no murmurs, gallops, or rubs appreciated   ABDOMEN: soft, nondistended, non-tender, normoactive, no HSM, no rebound, no guarding, no rigidity  SKIN: No rashes or lesions  NERVOUS SYSTEM: Alert & Oriented X3  EXT: no peripheral edema  PSYCH: calm and cooperative     LABS:        133<L>  |  97  |  11  ----------------------------<  139<H>  4.5   |  27  |  0.53      133<L>  |  94<L>  |  16  ----------------------------<  160<H>  4.6   |  27  |  0.54    Ca    8.8      31 Aug 2021 07:07  Ca    9.2      30 Aug 2021 18:26  Phos  4.0       Mg     2.0         TPro  6.3  /  Alb  2.4<L>  /  TBili  0.2  /  DBili  x   /  AST  111<H>  /  ALT  155<H>  /  AlkPhos  73    TPro  7.3  /  Alb  3.0<L>  /  TBili  0.2  /  DBili  x   /  AST  139<H>  /  ALT  192<H>  /  AlkPhos  81  08-30    PT/INR - ( 31 Aug 2021 07:13 )   PT: 14.2 sec;   INR: 1.19 ratio         PTT - ( 31 Aug 2021 07:13 )  PTT:27.4 sec              Urinalysis Basic - ( 30 Aug 2021 22:02 )    Color: Light Yellow / Appearance: Clear / S.009 / pH: x  Gluc: x / Ketone: Negative  / Bili: Negative / Urobili: Negative   Blood: x / Protein: Trace / Nitrite: Negative   Leuk Esterase: Negative / RBC: 1 /hpf / WBC 0 /HPF   Sq Epi: x / Non Sq Epi: 0 /hpf / Bacteria: Negative                              9.7    12.97 )-----------( 369      ( 31 Aug 2021 07:08 )             30.5                         10.4   17.23 )-----------( 408      ( 30 Aug 2021 18:26 )             33.4     CAPILLARY BLOOD GLUCOSE      POCT Blood Glucose.: 131 mg/dL (31 Aug 2021 07:24)  POCT Blood Glucose.: 97 mg/dL (31 Aug 2021 00:12)          RADIOLOGY & ADDITIONAL TESTS:            Imaging Personally Reviewed:  [ ] YES  [ ] NO    Consultants involved in case:   Consultant(s) Notes Reviewed:  [ ] YES  [ ] NO:   Care Discussed with Consultants/Other Providers [ ] YES  [ ] NO         KARLA LORENZ  61y  MRN: [id number]    Subjective:    Patient is a 61y old  Male who presents with a chief complaint of Myositis (31 Aug 2021 09:28)      Interval history/overnight events:  Xray esophagram, CT a/p, TTE, and LHC performed today.     Assessed at bedside while in cath recovery room. s/p cath with radial band, removed while at bedside. Dysphagia, proximal weakness, and weight loss symptoms all began together, per patient. Denies history of GERD or frequent NSAID use. Denies fever, chills, chest pain, nausea, vomiting. He reports being ok with trying to eat some liquids tonight. No other complaints at this time.     MEDICATIONS  (STANDING):  aspirin enteric coated 81 milliGRAM(s) Oral daily  dextrose 40% Gel 15 Gram(s) Oral once  dextrose 5%. 1000 milliLiter(s) (50 mL/Hr) IV Continuous <Continuous>  dextrose 5%. 1000 milliLiter(s) (100 mL/Hr) IV Continuous <Continuous>  dextrose 50% Injectable 25 Gram(s) IV Push once  dextrose 50% Injectable 12.5 Gram(s) IV Push once  dextrose 50% Injectable 25 Gram(s) IV Push once  enoxaparin Injectable 40 milliGRAM(s) SubCutaneous daily  glucagon  Injectable 1 milliGRAM(s) IntraMuscular once  insulin glargine Injectable (LANTUS) 5 Unit(s) SubCutaneous at bedtime  insulin lispro (ADMELOG) corrective regimen sliding scale   SubCutaneous three times a day before meals  insulin lispro (ADMELOG) corrective regimen sliding scale   SubCutaneous at bedtime  multivitamin 1 Tablet(s) Oral daily  thiamine 100 milliGRAM(s) Oral daily    MEDICATIONS  (PRN):        Objective:    Vitals: Vital Signs Last 24 Hrs  T(C): 36.8 (21 @ 08:48), Max: 37.2 (21 @ 01:20)  T(F): 98.2 (21 @ 08:48), Max: 99 (21 @ 01:20)  HR: 99 (21 @ 08:48) (92 - 111)  BP: 91/56 (21 @ 08:48) (91/56 - 137/75)  BP(mean): --  RR: 17 (21 @ 08:48) (16 - 18)  SpO2: 96% (21 @ 08:48) (96% - 100%)            I&O's Summary      PHYSICAL EXAM:  GENERAL: NAD, slight temporal wasting  HEENT: PERRL, no scleral icterus, no head and neck lymphadenopathy  CHEST/LUNG: CTAB, no increased respiratory effort  HEART: Tachycardic, no murmurs appreciated  ABDOMEN: soft, nondistended, non-tender, normoactive BS  SKIN: Dry skin behind knees, from winter rash per patient  NERVOUS SYSTEM: Alert & Oriented X3, 3/5 strength in shoulder flexion, 5/5  strength, 1/5 strength hip flexion, 4/5 knee flexion/extension, 5/5 plantarflexion/dorsiflexion, sensation intact all throughout  EXT: no peripheral edema  PSYCH: calm and cooperative     LABS:        133<L>  |  97  |  11  ----------------------------<  139<H>  4.5   |  27  |  0.53      133<L>  |  94<L>  |  16  ----------------------------<  160<H>  4.6   |  27  |  0.54    Ca    8.8      31 Aug 2021 07:07  Ca    9.2      30 Aug 2021 18:26  Phos  4.0       Mg     2.0         TPro  6.3  /  Alb  2.4<L>  /  TBili  0.2  /  DBili  x   /  AST  111<H>  /  ALT  155<H>  /  AlkPhos  73    TPro  7.3  /  Alb  3.0<L>  /  TBili  0.2  /  DBili  x   /  AST  139<H>  /  ALT  192<H>  /  AlkPhos  81  30    PT/INR - ( 31 Aug 2021 07:13 )   PT: 14.2 sec;   INR: 1.19 ratio         PTT - ( 31 Aug 2021 07:13 )  PTT:27.4 sec              Urinalysis Basic - ( 30 Aug 2021 22:02 )    Color: Light Yellow / Appearance: Clear / S.009 / pH: x  Gluc: x / Ketone: Negative  / Bili: Negative / Urobili: Negative   Blood: x / Protein: Trace / Nitrite: Negative   Leuk Esterase: Negative / RBC: 1 /hpf / WBC 0 /HPF   Sq Epi: x / Non Sq Epi: 0 /hpf / Bacteria: Negative                              9.7    12.97 )-----------( 369      ( 31 Aug 2021 07:08 )             30.5                         10.4   17.23 )-----------( 408      ( 30 Aug 2021 18:26 )             33.4     CAPILLARY BLOOD GLUCOSE      POCT Blood Glucose.: 131 mg/dL (31 Aug 2021 07:24)  POCT Blood Glucose.: 97 mg/dL (31 Aug 2021 00:12)          RADIOLOGY & ADDITIONAL TESTS:            Imaging Personally Reviewed:  [ ] YES  [ ] NO    Consultants involved in case:   Consultant(s) Notes Reviewed:  [ ] YES  [ ] NO:   Care Discussed with Consultants/Other Providers [ ] YES  [ ] NO         KARLA LORENZ  61y  MRN: [id number]    Subjective:    Patient is a 61y old  Male who presents with a chief complaint of Myositis (31 Aug 2021 09:28)      Interval history/overnight events:  Xray esophagram, CT a/p, TTE, and LHC performed today.     Assessed at bedside while in cath recovery room. s/p cath with radial band, removed while at bedside. Dysphagia, proximal weakness, and weight loss symptoms all began together, per patient. Denies history of GERD or frequent NSAID use. Denies fever, chills, chest pain, nausea, vomiting. He reports being ok with trying to eat some liquids tonight. No other complaints at this time.     MEDICATIONS  (STANDING):  aspirin enteric coated 81 milliGRAM(s) Oral daily  dextrose 40% Gel 15 Gram(s) Oral once  dextrose 5%. 1000 milliLiter(s) (50 mL/Hr) IV Continuous <Continuous>  dextrose 5%. 1000 milliLiter(s) (100 mL/Hr) IV Continuous <Continuous>  dextrose 50% Injectable 25 Gram(s) IV Push once  dextrose 50% Injectable 12.5 Gram(s) IV Push once  dextrose 50% Injectable 25 Gram(s) IV Push once  enoxaparin Injectable 40 milliGRAM(s) SubCutaneous daily  glucagon  Injectable 1 milliGRAM(s) IntraMuscular once  insulin glargine Injectable (LANTUS) 5 Unit(s) SubCutaneous at bedtime  insulin lispro (ADMELOG) corrective regimen sliding scale   SubCutaneous three times a day before meals  insulin lispro (ADMELOG) corrective regimen sliding scale   SubCutaneous at bedtime  multivitamin 1 Tablet(s) Oral daily  thiamine 100 milliGRAM(s) Oral daily    MEDICATIONS  (PRN):        Objective:    Vitals: Vital Signs Last 24 Hrs  T(C): 36.8 (21 @ 08:48), Max: 37.2 (21 @ 01:20)  T(F): 98.2 (21 @ 08:48), Max: 99 (21 @ 01:20)  HR: 99 (21 @ 08:48) (92 - 111)  BP: 91/56 (21 @ 08:48) (91/56 - 137/75)  BP(mean): --  RR: 17 (21 @ 08:48) (16 - 18)  SpO2: 96% (21 @ 08:48) (96% - 100%)            I&O's Summary      PHYSICAL EXAM:  GENERAL: NAD, slight temporal wasting  HEENT: PERRL, no scleral icterus, no head and neck lymphadenopathy  CHEST/LUNG: CTAB, no increased respiratory effort  HEART: Tachycardic, no murmurs appreciated  ABDOMEN: soft, nondistended, non-tender, normoactive BS  SKIN: Dry skin behind knees, from winter rash per patient  NERVOUS SYSTEM: Alert & Oriented X3, 3/5 strength in shoulder flexion, 5/5  strength, 1/5 strength hip flexion, 4/5 knee flexion/extension, 5/5 plantarflexion/dorsiflexion, sensation intact all throughout, unable to elicit patellar or brachial reflexes  EXT: no peripheral edema  PSYCH: calm and cooperative     LABS:        133<L>  |  97  |  11  ----------------------------<  139<H>  4.5   |  27  |  0.53      133<L>  |  94<L>  |  16  ----------------------------<  160<H>  4.6   |  27  |  0.54    Ca    8.8      31 Aug 2021 07:07  Ca    9.2      30 Aug 2021 18:26  Phos  4.0       Mg     2.0         TPro  6.3  /  Alb  2.4<L>  /  TBili  0.2  /  DBili  x   /  AST  111<H>  /  ALT  155<H>  /  AlkPhos  73    TPro  7.3  /  Alb  3.0<L>  /  TBili  0.2  /  DBili  x   /  AST  139<H>  /  ALT  192<H>  /  AlkPhos  81      PT/INR - ( 31 Aug 2021 07:13 )   PT: 14.2 sec;   INR: 1.19 ratio         PTT - ( 31 Aug 2021 07:13 )  PTT:27.4 sec              Urinalysis Basic - ( 30 Aug 2021 22:02 )    Color: Light Yellow / Appearance: Clear / S.009 / pH: x  Gluc: x / Ketone: Negative  / Bili: Negative / Urobili: Negative   Blood: x / Protein: Trace / Nitrite: Negative   Leuk Esterase: Negative / RBC: 1 /hpf / WBC 0 /HPF   Sq Epi: x / Non Sq Epi: 0 /hpf / Bacteria: Negative                              9.7    12.97 )-----------( 369      ( 31 Aug 2021 07:08 )             30.5                         10.4   17.23 )-----------( 408      ( 30 Aug 2021 18:26 )             33.4     CAPILLARY BLOOD GLUCOSE      POCT Blood Glucose.: 131 mg/dL (31 Aug 2021 07:24)  POCT Blood Glucose.: 97 mg/dL (31 Aug 2021 00:12)          RADIOLOGY & ADDITIONAL TESTS:            Imaging Personally Reviewed:  [ ] YES  [ ] NO    Consultants involved in case:   Consultant(s) Notes Reviewed:  [ ] YES  [ ] NO:   Care Discussed with Consultants/Other Providers [ ] YES  [ ] NO

## 2021-08-31 NOTE — SWALLOW BEDSIDE ASSESSMENT ADULT - SWALLOW EVAL: DIAGNOSIS
New order received for clinical bedside swallow evaluation s/p admission with dysphagia. Pt currently NPO for LHC. Will follow up with evaluation when Pt medically appropriate for PO intake.

## 2021-08-31 NOTE — CHART NOTE - NSCHARTNOTEFT_GEN_A_CORE
Cardiac Cath today showed non-obstructive coronary artery disease.  No need for heparin gtt. Continue medical management. Discussed with Primary Team.  Will continue to follow with you.

## 2021-08-31 NOTE — CONSULT NOTE ADULT - NSCONSULTADDITIONALINFOA_GEN_ALL_CORE
Dysphagia  Likely oropharnygeal  Mostly tolerating PO  Weight loss noted  Pending speech, swallow eval

## 2021-08-31 NOTE — H&P ADULT - ASSESSMENT
61 year old man with PMH DM2 presents with 5 month history of weight loss, proximal muscle weakness, dysphagia found to have very elevated troponin levels and normal EKG. Recent Echo and EGD mostly within normal limits. Outpatient labs 61 year old man with PMH DM2 presents with 5 month history of weight loss, proximal muscle weakness, likely neuromuscular dysphagia found to have very elevated troponin levels and normal EKG. Recent Echo and EGD mostly within normal limits. Outpatient labs with elevations in CK, troponin, aldolase, and CRP but normal TSH, SAMIA, antibodies for smith, RNP, centromere, scleroderma, and dsDNA. Overall picture favors inflammatory myopathy; without obvious rash favor polymyositis vs inclusion body myositis vs necrotizing autoimmune myositis. Also on the differential are malignancy, HIV, amyloidosis, TB, or chronic infection.

## 2021-08-31 NOTE — H&P ADULT - PROBLEM SELECTOR PLAN 6
patient has 40lb weight loss in 5 months.  -nutrition consult  -multivitamin/thiamine  -watch for refeeding syndrome Had colonic thickening on CT at OSH. planned for colonoscopy but held off due to elevated troponins and request for LHC prior to C-scope. per report, the thickening could have been due to underdistension.  -given current differential patient would likely benefit from malignancy workup anyway  -would obtain CT chest/abdo/pelvis with contrast as malignancy is both on the differential as well as a cause of paraneoplastic myositis.  -GI consult

## 2021-08-31 NOTE — H&P ADULT - PROBLEM SELECTOR PLAN 7
lovenox  regular (soft) diet as tolerated - can switch to consistent carb if sugars poorly controlled. patient has 40lb weight loss in 5 months.  -nutrition consult  -multivitamin/thiamine  -watch for refeeding syndrome

## 2021-08-31 NOTE — PROGRESS NOTE ADULT - PROBLEM SELECTOR PLAN 6
Had colonic thickening on CT at OSH. planned for colonoscopy but held off due to elevated troponins and request for LHC prior to C-scope. per report, the thickening could have been due to underdistension.  -given current differential patient would likely benefit from malignancy workup anyway  -would obtain CT chest/abdo/pelvis with contrast as malignancy is both on the differential as well as a cause of paraneoplastic myositis.  -GI consult Had colonic thickening on CT at OSH. planned for colonoscopy but held off due to elevated troponins and request for LHC prior to C-scope. per report, the thickening could have been due to underdistension.  -given current differential patient would likely benefit from malignancy workup anyway  - No evidence of malignancy on CT chest/abdo/pelvis   - GI following for dysphagia and colonic thickening, appreciate recs

## 2021-08-31 NOTE — H&P ADULT - HISTORY OF PRESENT ILLNESS
61 year old man with PMH DM2 presents to the hospital with subacute weakness, weight loss, dysphagia and admitted for troponemia. Patient reports gradual onset of dysphagia starting in 4/2021 (reports getting pfizer series in 3/2021) that was to both solids and liquids. He reports that he could only tolerate very small sips of water or bites of food and that he has an intermittent sensation of lump in his throat that when present makes it impossible to eat or drink anything resulting in choking/coughing/regurgitation. Around the same time he reports progressive SOB and fatigue and a ~40lb weight loss. he reports weakness of his biceps and legs and has difficulty standing from a seated position as well as reaching his arms over his head. He also has a bruise on his R bicep from when someone lightly squeezed it that has grown very large. He was admitted several times to [facility] with extensive workup. He had labs showing elevation in CK, CRP, aldolase, troponin, CKMB, AST/ALT but negative SAMIA, C3/C4, anti-RNP, smith, centromere, scleroderma, and dsDNA (anti-Mi2 and anti-Natasha were pending). He had an echocardiogram with LVEF 70%, normal RCSF, and no valvular disorders. He had CTA chest which showed only 6mm calcified granuloma in RLL and some calcified mediastinal LNs - ACE level and Quant Gold were pending. He had abdominal CT with thickening of colon of hepatic flexure (concerning for underdistension) but he was not able to get colonoscopy. He had MRI of L spine with mild L4-L5 and L5-S1 disease. He also had an outpatient EGD which was normal except for mild inflammatory changes and erythematous mucosa without evidence of malignancy or H pylori. today he denies fevers, chills, chest pain (at rest, with exertion, and with changes in postition), SOB at rest, palpitations, abdominal pain, N/V (in the absence of eating), diarrhea, dysuria, heartburn, decreased  strength, joint pain.    In the ED vitals were stable except for mild tachycardia. patient received 1L LR.

## 2021-08-31 NOTE — H&P ADULT - NSHPSOCIALHISTORY_GEN_ALL_CORE
Never smoker, never drinker, no drugs.  Monogamous with wife, no STIs  Lives with wife and son's family. daughter in law helps take care of him.  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

## 2021-08-31 NOTE — PROGRESS NOTE ADULT - PROBLEM SELECTOR PLAN 8
lovenox  NPO until swallow eval lovenox  restart diet after swallow study and [facility]

## 2021-08-31 NOTE — H&P ADULT - NSHPLABSRESULTS_GEN_ALL_CORE
.  LABS:                         10.4   17.23 )-----------( 408      ( 30 Aug 2021 18:26 )             33.4         133<L>  |  94<L>  |  16  ----------------------------<  160<H>  4.6   |  27  |  0.54    Ca    9.2      30 Aug 2021 18:26  Phos  4.0       Mg     2.0         TPro  7.3  /  Alb  3.0<L>  /  TBili  0.2  /  DBili  x   /  AST  139<H>  /  ALT  192<H>  /  AlkPhos  81          Urinalysis Basic - ( 30 Aug 2021 22:02 )    Color: Light Yellow / Appearance: Clear / S.009 / pH: x  Gluc: x / Ketone: Negative  / Bili: Negative / Urobili: Negative   Blood: x / Protein: Trace / Nitrite: Negative   Leuk Esterase: Negative / RBC: 1 /hpf / WBC 0 /HPF   Sq Epi: x / Non Sq Epi: 0 /hpf / Bacteria: Negative      CARDIAC MARKERS ( 30 Aug 2021 18:26 )  x     / x     / x     / x     / 167.5 ng/mL      Serum Pro-Brain Natriuretic Peptide: 51 pg/mL ( @ 18:26)            RADIOLOGY, EKG & ADDITIONAL TESTS: Reviewed.   EKG: sinus tachycardia, p-mitrale, no ST changes, possible low-amplitude Personally reviewed labs, imaging reports and ekg. Also reviewed outpatient records.     LABS:                         10.4   17.23 )-----------( 408      ( 30 Aug 2021 18:26 )             33.4     133<L>  |  94<L>  |  16  ----------------------------<  160<H>  4.6   |  27  |  0.54    Ca    9.2      30 Aug 2021 18:26  Phos  4.0       Mg     2.0         TPro  7.3  /  Alb  3.0<L>  /  TBili  0.2  /  DBili  x   /  AST  139<H>  /  ALT  192<H>  /  AlkPhos  81      Urinalysis Basic - ( 30 Aug 2021 22:02 )  Color: Light Yellow / Appearance: Clear / S.009 / pH: x  Gluc: x / Ketone: Negative  / Bili: Negative / Urobili: Negative   Blood: x / Protein: Trace / Nitrite: Negative   Leuk Esterase: Negative / RBC: 1 /hpf / WBC 0 /HPF   Sq Epi: x / Non Sq Epi: 0 /hpf / Bacteria: Negative      CARDIAC MARKERS ( 30 Aug 2021 18:26 )  x     / x     / x     / x     / 167.5 ng/mL      Serum Pro-Brain Natriuretic Peptide: 51 pg/mL ( @ 18:26)    RADIOLOGY, EKG & ADDITIONAL TESTS: Reviewed.   EKG: sinus tachycardia, p-mitrale, no ST changes, possible low-amplitude

## 2021-08-31 NOTE — CONSULT NOTE ADULT - ASSESSMENT
61M w/ T2DM presenting w/ progressive proximal muscle weakness, dysphagia, weight loss, troponemia. GI consulted for dysphagia and colon thickening seen on outpt CT. Overall picture concerning for rheumatologic origin given muscle weakness, elevated trops/transaminases/CKMB, however other etiologies of dysphagia must be considered.     #Dysphagia  -agree w/ barium swallow  -will review EGD reports   -    #Colonic thickening  -repeat CT C/A/P w/ IV contrast performed, will follow up read    INCOMPLETE NOTE    Bernadine Johnson MD, PGY-3  Internal Medicine     61M w/ T2DM presenting w/ progressive proximal muscle weakness, dysphagia, weight loss, troponemia. GI consulted for dysphagia and colon thickening seen on outpt CT. Overall picture concerning for rheumatologic origin given muscle weakness, elevated trops/transaminases/CKMB, however other etiologies of dysphagia must be considered.     #Dysphagia  -no evidence of stricture or mass on esophagram  -will review EGD reports   -pending speech and swallow evaluation    #Colonic thickening  -repeat CT C/A/P w/ IV contrast performed showing no evidence of any thickening        Bernadine Johnson MD, PGY-3  Internal Medicine

## 2021-08-31 NOTE — PROGRESS NOTE ADULT - PROBLEM SELECTOR PLAN 3
Normal MCV. Suspect AoCD.  -f/u retic count, iron studies, haptoglobin, B12, folate Normal MCV. Suspect AoCD.  - Hgb 9.7 from 10.4  - LDH elevated, haptoglobin and bili wnl  - b12/folate wnl  - iron 8% sat, serum iron 13, TIBC decreased, ferritin elevated 538  - continue to monitor

## 2021-08-31 NOTE — CONSULT NOTE ADULT - SUBJECTIVE AND OBJECTIVE BOX
Chief Complaint:  Patient is a 61y old  Male who presents with a chief complaint of Myositis (31 Aug 2021 09:28)      HPI: 61M w/ PMHx T2DM presenting to the hospital for subacute weakness, dramatic weight loss of 40 lbs over 3 months, dysphagia to solids and liquids, admitted for troponemia. Pt had Pfizer vaccine in March, however reports onset of dysphagia since April to both liquids and solids. He endorses a globus sensation in his throat which when present makes it impossible to to eat/drink. He had an outpatient EGD which showed mild inflammatory changes and erythematous mucosa without evidence of malignancy or H pylori. Pt experiencing progressive proximal muscle weakness with elevations of CK, CRP, aldolase, trop, CKMB, transaminases, however rheumatologic workup for scleroderma was negative. Abdominal CT was performed at [facility] which showed thickening of colon by hepatic flexure though concern for underdistension. Colonoscopy was deferred due to need for LHC for his concurrent troponemia.       Allergies:  No Known Allergies      Home Medications:  [facility] Medications:  aspirin enteric coated 81 milliGRAM(s) Oral daily  dextrose 40% Gel 15 Gram(s) Oral once  dextrose 5%. 1000 milliLiter(s) IV Continuous <Continuous>  dextrose 5%. 1000 milliLiter(s) IV Continuous <Continuous>  dextrose 50% Injectable 25 Gram(s) IV Push once  dextrose 50% Injectable 12.5 Gram(s) IV Push once  dextrose 50% Injectable 25 Gram(s) IV Push once  enoxaparin Injectable 40 milliGRAM(s) SubCutaneous daily  glucagon  Injectable 1 milliGRAM(s) IntraMuscular once  insulin glargine Injectable (LANTUS) 5 Unit(s) SubCutaneous at bedtime  insulin lispro (ADMELOG) corrective regimen sliding scale   SubCutaneous three times a day before meals  insulin lispro (ADMELOG) corrective regimen sliding scale   SubCutaneous at bedtime  multivitamin 1 Tablet(s) Oral daily  thiamine 100 milliGRAM(s) Oral daily      PMHX/PSHX:  Type 2 diabetes mellitus    History of esophagogastroduodenoscopy (EGD)        Family history:  FH: leukemia (Father)        Denies family history of colon cancer/polyps, stomach cancer/polyps, pancreatic cancer/masses, liver cancer/disease, ovarian cancer and endometrial cancer.    Social History:     Tob: Denies  EtOH: Denies  Illicit Drugs: Denies    ROS:     General:  No wt loss, fevers, chills, night sweats, fatigue  Eyes:  Good vision, no reported pain  ENT:  No sore throat, pain, runny nose, dysphagia  CV:  No pain, palpitations, hypo/hypertension  Pulm:  No dyspnea, cough, tachypnea, wheezing  GI:  No pain, No nausea, No vomiting, No diarrhea, No constipation, No weight loss, No fever, No pruritis, No rectal bleeding, No tarry stools, No dysphagia,  :  No pain, bleeding, incontinence, nocturia  Muscle:  No pain, weakness  Neuro:  No weakness, tingling, memory problems  Psych:  No fatigue, insomnia, mood problems, depression  Endocrine:  No polyuria, polydipsia, cold/heat intolerance  Heme:  No petechiae, ecchymosis, easy bruisability  Skin:  No rash, tattoos, scars, edema    PHYSICAL EXAM:     GENERAL:  No acute distress  HEENT:  Normocephalic/atraumatic, no scleral icterus  CHEST:  Clear to auscultation bilaterally, no wheezes/rales/ronchi, no accessory muscle use  HEART:  Regular rate and rhythm, no murmurs/rubs/gallops  ABDOMEN:  Soft, non-tender, non-distended, normoactive bowel sounds,  no masses, no hepato-splenomegaly, no signs of chronic liver disease  EXTREMITIES: No cyanosis, clubbing, or edema  SKIN:  No rash/erythema/ecchymoses/petechiae/wounds/abscess/warm/dry  NEURO:  Alert and oriented x 3, no asterixis    Vital Signs:  Vital Signs Last 24 Hrs  T(C): 36.8 (31 Aug 2021 08:48), Max: 37.2 (31 Aug 2021 01:20)  T(F): 98.2 (31 Aug 2021 08:48), Max: 99 (31 Aug 2021 01:20)  HR: 99 (31 Aug 2021 08:48) (92 - 111)  BP: 91/56 (31 Aug 2021 08:48) (91/56 - 137/75)  BP(mean): --  RR: 17 (31 Aug 2021 08:48) (16 - 18)  SpO2: 96% (31 Aug 2021 08:48) (96% - 100%)  Daily Height in cm: 172.72 (30 Aug 2021 14:18)    Daily     LABS:                        9.7    12.97 )-----------( 369      ( 31 Aug 2021 07:08 )             30.5     Mean Cell Volume: 85.4 fl (21 @ 07:08)    08    133<L>  |  97  |  11  ----------------------------<  139<H>  4.5   |  27  |  0.53    Ca    8.8      31 Aug 2021 07:07  Phos  4.0     08  Mg     2.0         TPro  6.3  /  Alb  2.4<L>  /  TBili  0.2  /  DBili  x   /  AST  111<H>  /  ALT  155<H>  /  AlkPhos  73      LIVER FUNCTIONS - ( 31 Aug 2021 07:07 )  Alb: 2.4 g/dL / Pro: 6.3 g/dL / ALK PHOS: 73 U/L / ALT: 155 U/L / AST: 111 U/L / GGT: x           PT/INR - ( 31 Aug 2021 07:13 )   PT: 14.2 sec;   INR: 1.19 ratio         PTT - ( 31 Aug 2021 07:13 )  PTT:27.4 sec  Urinalysis Basic - ( 30 Aug 2021 22:02 )    Color: Light Yellow / Appearance: Clear / S.009 / pH: x  Gluc: x / Ketone: Negative  / Bili: Negative / Urobili: Negative   Blood: x / Protein: Trace / Nitrite: Negative   Leuk Esterase: Negative / RBC: 1 /hpf / WBC 0 /HPF   Sq Epi: x / Non Sq Epi: 0 /hpf / Bacteria: Negative                              9.7    12.97 )-----------( 369      ( 31 Aug 2021 07:08 )             30.5                         10.4   17.23 )-----------( 408      ( 30 Aug 2021 18:26 )             33.4       Imaging:           Chief Complaint:  Patient is a 61y old  Male who presents with a chief complaint of Myositis (31 Aug 2021 09:28)      HPI: 61M w/ PMHx T2DM presenting to the hospital for subacute weakness, dramatic weight loss of 40 lbs over 3 months, dysphagia to solids and liquids, admitted for troponemia. Pt had Pfizer vaccine in March, however reports onset of dysphagia since April to both liquids and solids. He endorses a globus sensation in his throat which when present makes it impossible to to eat/drink. He had an outpatient EGD which showed mild inflammatory changes and erythematous mucosa without evidence of malignancy or H pylori. Pt experiencing progressive proximal muscle weakness with elevations of CK, CRP, aldolase, trop, CKMB, transaminases, however rheumatologic workup for scleroderma was negative. Abdominal CT was performed at [facility] which showed thickening of colon by hepatic flexure though concern for underdistension. Colonoscopy was deferred due to need for LHC for his concurrent troponemia.       Allergies:  No Known Allergies      Home Medications:  [facility] Medications:  aspirin enteric coated 81 milliGRAM(s) Oral daily  dextrose 40% Gel 15 Gram(s) Oral once  dextrose 5%. 1000 milliLiter(s) IV Continuous <Continuous>  dextrose 5%. 1000 milliLiter(s) IV Continuous <Continuous>  dextrose 50% Injectable 25 Gram(s) IV Push once  dextrose 50% Injectable 12.5 Gram(s) IV Push once  dextrose 50% Injectable 25 Gram(s) IV Push once  enoxaparin Injectable 40 milliGRAM(s) SubCutaneous daily  glucagon  Injectable 1 milliGRAM(s) IntraMuscular once  insulin glargine Injectable (LANTUS) 5 Unit(s) SubCutaneous at bedtime  insulin lispro (ADMELOG) corrective regimen sliding scale   SubCutaneous three times a day before meals  insulin lispro (ADMELOG) corrective regimen sliding scale   SubCutaneous at bedtime  multivitamin 1 Tablet(s) Oral daily  thiamine 100 milliGRAM(s) Oral daily      PMHX/PSHX:  Type 2 diabetes mellitus    History of esophagogastroduodenoscopy (EGD)        Family history:  FH: leukemia (Father)        Denies family history of colon cancer/polyps, stomach cancer/polyps, pancreatic cancer/masses, liver cancer/disease, ovarian cancer and endometrial cancer.    Social History:     Tob: Denies  EtOH: Denies  Illicit Drugs: Denies    ROS:     General:  No wt loss, fevers, chills, night sweats, fatigue  Eyes:  Good vision, no reported pain  ENT:  No sore throat, pain, runny nose, +dysphagia  CV:  No pain, palpitations, hypo/hypertension  Pulm:  No dyspnea, cough, tachypnea, wheezing  GI:  +weight loss, decreased appetite. No pain, No nausea, No vomiting, No diarrhea, No constipation, no pruritis, No rectal bleeding, No tarry stools  :  No pain, bleeding, incontinence, nocturia  Muscle:  No pain, weakness  Neuro:  +weakness, no tingling, memory problems  Psych:  No fatigue, insomnia, mood problems, depression  Endocrine:  No polyuria, polydipsia, cold/heat intolerance  Heme:  No petechiae, ecchymosis, easy bruisability  Skin:  No rash, tattoos, scars, edema    PHYSICAL EXAM:     GENERAL:  No acute distress  HEENT:  Normocephalic/atraumatic, no scleral icterus  CHEST:  Clear to auscultation bilaterally, no wheezes/rales/ronchi, no accessory muscle use  HEART:  Regular rate and rhythm, no murmurs/rubs/gallops  ABDOMEN:  Soft, non-tender, non-distended, normoactive bowel sounds,  no masses, no hepato-splenomegaly, no signs of chronic liver disease  EXTREMITIES: No cyanosis, clubbing, or edema  SKIN:  No rash/erythema/ecchymoses/petechiae/wounds/abscess/warm/dry  NEURO:  Alert and oriented x 3, no asterixis    Vital Signs:  Vital Signs Last 24 Hrs  T(C): 36.8 (31 Aug 2021 08:48), Max: 37.2 (31 Aug 2021 01:20)  T(F): 98.2 (31 Aug 2021 08:48), Max: 99 (31 Aug 2021 01:20)  HR: 99 (31 Aug 2021 08:48) (92 - 111)  BP: 91/56 (31 Aug 2021 08:48) (91/56 - 137/75)  BP(mean): --  RR: 17 (31 Aug 2021 08:48) (16 - 18)  SpO2: 96% (31 Aug 2021 08:48) (96% - 100%)  Daily Height in cm: 172.72 (30 Aug 2021 14:18)    Daily     LABS:                        9.7    12.97 )-----------( 369      ( 31 Aug 2021 07:08 )             30.5     Mean Cell Volume: 85.4 fl (21 @ 07:08)        133<L>  |  97  |  11  ----------------------------<  139<H>  4.5   |  27  |  0.53    Ca    8.8      31 Aug 2021 07:07  Phos  4.0       Mg     2.0         TPro  6.3  /  Alb  2.4<L>  /  TBili  0.2  /  DBili  x   /  AST  111<H>  /  ALT  155<H>  /  AlkPhos  73      LIVER FUNCTIONS - ( 31 Aug 2021 07:07 )  Alb: 2.4 g/dL / Pro: 6.3 g/dL / ALK PHOS: 73 U/L / ALT: 155 U/L / AST: 111 U/L / GGT: x           PT/INR - ( 31 Aug 2021 07:13 )   PT: 14.2 sec;   INR: 1.19 ratio         PTT - ( 31 Aug 2021 07:13 )  PTT:27.4 sec  Urinalysis Basic - ( 30 Aug 2021 22:02 )    Color: Light Yellow / Appearance: Clear / S.009 / pH: x  Gluc: x / Ketone: Negative  / Bili: Negative / Urobili: Negative   Blood: x / Protein: Trace / Nitrite: Negative   Leuk Esterase: Negative / RBC: 1 /hpf / WBC 0 /HPF   Sq Epi: x / Non Sq Epi: 0 /hpf / Bacteria: Negative                              9.7    12.97 )-----------( 369      ( 31 Aug 2021 07:08 )             30.5                         10.4   17.23 )-----------( 408      ( 30 Aug 2021 18:26 )             33.4       Imaging:

## 2021-08-31 NOTE — H&P ADULT - PROBLEM SELECTOR PLAN 2
Patients presentation of subacute-chronic weight loss, fatigue, proximal muscle weakness, (neuromuscular) dysphagia, and elevated troponins, CK, aldolase, CRP, and transaminases favor inflammatory myopathy. Without rash dermatomyositis is less likely. Patient was prescribed statins upon discharge from [facility] but he did not report taking them and symptoms began prior to that point. DDx includes PM, IBM, NM. Will repeat TSH although was 3.4 as outpatient.   -f/u SAMIA, ESR  -rheumatology consult in AM  -tests for anti-Jo1 and anti-Mi2 were sent at [facility] but had not resulted at time of discharge. can consider repeating said tests or calling over to try to get results.  -patient would likely benefit from muscle biopsy  -also on DDX:  --TB - had calcified granuloma but no cough, night sweats, fevers, etc. - f/u quant gold  --amyloidosis - +protein gap and ?cardiac/hepatic dysfunction - f/u SPEP/UPEP/IF  --viral infection - f/u HIV, hepatitis panel, EBV, CMV Patients presentation of subacute-chronic weight loss, fatigue, proximal muscle weakness, (neuromuscular) dysphagia, and elevated troponins, CK, aldolase, CRP, and transaminases favor inflammatory myopathy. Without rash dermatomyositis is less likely. Patient was prescribed statins upon discharge from [facility] but he did not report taking them and symptoms began prior to that point. DDx includes PM, IBM, NM. Will repeat TSH although was 3.4 as outpatient.   -f/u SAMIA, ESR  -rheumatology consult in AM  -tests for anti-Jo1 and anti-Mi2 were sent at [facility] but had not resulted at time of discharge. repeating now but would cancel if can get records  -patient would likely benefit from muscle biopsy  -also on DDX:  --TB - had calcified granuloma but no cough, night sweats, fevers, etc. - f/u quant gold  --amyloidosis - +protein gap and ?cardiac/hepatic dysfunction - f/u SPEP/UPEP/IF  --viral infection - f/u HIV, hepatitis panel, EBV, CMV, parvo Patients presentation of subacute-chronic weight loss, fatigue, proximal muscle weakness, (neuromuscular) dysphagia, and elevated troponins, CK, aldolase, CRP, and transaminases favor inflammatory myopathy. Without rash dermatomyositis is less likely. W/o any joint pain. Patient was prescribed statins upon discharge from [facility] but he did not report taking them and symptoms began prior to that point. DDx includes PM, IBM, NM. Will repeat TSH although was 3.4 as outpatient.   -f/u SAMIA, ESR  -rheumatology consult in AM  -tests for anti-Jo1 and anti-Mi2 were sent at [facility] but had not resulted at time of discharge. Repeating now but would cancel if can get records.   -patient would likely benefit from muscle biopsy  -also on DDX:  --TB - had calcified granuloma but no cough, night sweats, fevers, etc. - f/u quant gold  --amyloidosis - +protein gap and ?cardiac/hepatic dysfunction - f/u SPEP/UPEP/IF  --viral infection - f/u HIV, hepatitis panel, EBV, CMV, parvo  - less likely medication induced, most commonly due to statins and steroids which pt has not taken  - consider paraneoplastic- currently w/o known underlying neoplasm; f/up CT chest/abd/pelvis

## 2021-08-31 NOTE — H&P ADULT - ATTENDING COMMENTS
61 yr old gentleman w/ hx of DM, presents with multiple progressive complaints. Patient notes that since April 2021 he has had progressive muscle weakness, weight loss and dysphagia. Pt endorses at least 40lb weight loss over the last 3 months. Pt also endorses inability to swallow both solids and liquids. Per PMD note, pt with TIJERINA and elevated trop, CPK, and LDH on outpatient labs.   Pt has been hospitalized twice in the past [facility] Pt w/ CT C/A/P in May showing colon thickening and awaiting colonoscopy.     Pt w/ gradual onset of symmetric proximal muscle weakness associated with enzyme elevations indicative of muscle injury due to inflammation concerning for myositis/ inflammatory myopathy.  Unclear underlying etiology. Has had increasing difficulty getting up from a chair and now feels unable to get out of bed at all due to weakness. Also w fatigue and concomitant weight loss (40 lbs). Denies rash, joint pain, fever/chills. W/o dermatologic manifestations.  DDX includes Primary polymyositis, immune-mediated necrotizing myopathy, anti-synthetase, and inclusion body myositis. W/o rash dermatomyositis appears less likely. Concern also for underlying paraneoplastic syndrome or other infiltrative disease such as amyloidosis. Less likely to be iatrogenic due to medication as no offending medication (statin or steroids most likely) noted. No clear underlying causea, outpt TSH wnl. Otherwise Ca wnl, less likely parathyroid.. Can consider cortisol levels if other labs unrevealing. F/up Vit D.   Workup as above. Rheum and GI consult in am. Swallow eval and barium swallow, EGD at outside hospital normal; consider ENT eval.     rest as above    Duke Monroy DO  Division of Hospital Medicine

## 2021-08-31 NOTE — PROGRESS NOTE ADULT - ASSESSMENT
61 year old man with PMH DM2 presents with 5 month history of weight loss, proximal muscle weakness, likely neuromuscular dysphagia found to have very elevated troponin levels and normal EKG. Recent Echo and EGD mostly within normal limits. Outpatient labs with elevations in CK, troponin, aldolase, and CRP but normal TSH, SAMIA, antibodies for smith, RNP, centromere, scleroderma, and dsDNA. Overall picture favors inflammatory myopathy; without obvious rash favor polymyositis vs inclusion body myositis vs necrotizing autoimmune myositis. Also on the differential are malignancy, HIV, amyloidosis, TB, or chronic infection.

## 2021-08-31 NOTE — PROGRESS NOTE ADULT - PROBLEM SELECTOR PLAN 2
Patients presentation of subacute-chronic weight loss, fatigue, proximal muscle weakness, (neuromuscular) dysphagia, and elevated troponins, CK, aldolase, CRP, and transaminases favor inflammatory myopathy. Without rash dermatomyositis is less likely. W/o any joint pain. Patient was prescribed statins upon discharge from [facility] but he did not report taking them and symptoms began prior to that point. DDx includes PM, IBM, NM. Will repeat TSH although was 3.4 as outpatient.   -f/u SAMIA, ESR  -rheumatology consult in AM  -tests for anti-Jo1 and anti-Mi2 were sent at [facility] but had not resulted at time of discharge. Repeating now but would cancel if can get records.   -patient would likely benefit from muscle biopsy  -also on DDX:  --TB - had calcified granuloma but no cough, night sweats, fevers, etc. - f/u quant gold  --amyloidosis - +protein gap and ?cardiac/hepatic dysfunction - f/u SPEP/UPEP/IF  --viral infection - f/u HIV, hepatitis panel, EBV, CMV, parvo  - less likely medication induced, most commonly due to statins and steroids which pt has not taken  - consider paraneoplastic- currently w/o known underlying neoplasm; f/up CT chest/abd/pelvis Patients presentation of subacute-chronic weight loss, fatigue, proximal muscle weakness, (neuromuscular) dysphagia, and elevated troponins, CK, aldolase, CRP, and transaminases favor inflammatory myopathy. Without rash dermatomyositis is less likely. W/o any joint pain. Patient was prescribed statins upon discharge from [facility] but he did not report taking them and symptoms began prior to that point. DDx includes PM, IBM, NM. Will repeat TSH although was 3.4 as outpatient.   -f/u SAMIA, ESR  -tests for anti-Jo1 and anti-Mi2 were sent at [facility] but had not resulted at time of discharge. Repeating now but would cancel if can get records.   -rheumatology consulted, appreciate recs  -patient would likely benefit from muscle biopsy  -also on DDX:  --TB - had calcified granuloma but no cough, night sweats, fevers, etc. - f/u quant gold  --amyloidosis - +protein gap and ?cardiac/hepatic dysfunction - f/u SPEP/UPEP/IF  --viral infection - f/u HIV, hepatitis panel, EBV, CMV, parvo  - less likely medication induced, most commonly due to statins and steroids which pt has not taken  - consider paraneoplastic- currently w/o known underlying neoplasm; f/up CT chest/abd/pelvis Patients presentation of subacute-chronic weight loss, fatigue, proximal muscle weakness, (neuromuscular) dysphagia, and elevated troponins, CK, aldolase, CRP, and transaminases favor inflammatory myopathy. Without rash dermatomyositis is less likely. W/o any joint pain. Patient was prescribed statins upon discharge from [facility] but he did not report taking them and symptoms began prior to that point. DDx includes PM, IBM, NM. Will repeat TSH although was 3.4 as outpatient.   -rheumatology following    - considering pulse dose steroids or IVIG    - f/u lab panels    - patient would likely benefit from muscle biopsy  -also on DDX:  --TB - had calcified granuloma but no cough, night sweats, fevers, etc. - f/u quant gold  --amyloidosis - +protein gap and ?cardiac/hepatic dysfunction - f/u SPEP/UPEP/IF  --viral infection - f/u HIV, hepatitis panel, EBV, CMV, parvo  - less likely medication induced, most commonly due to statins and steroids which pt has not taken  - consider paraneoplastic- currently w/o known underlying neoplasm; f/up CT chest/abd/pelvis

## 2021-08-31 NOTE — H&P ADULT - NSHPPHYSICALEXAM_GEN_ALL_CORE
Vital Signs Last 24 Hrs  T(C): 36.7 (30 Aug 2021 23:58), Max: 37.1 (30 Aug 2021 14:18)  T(F): 98 (30 Aug 2021 23:58), Max: 98.8 (30 Aug 2021 17:53)  HR: 104 (30 Aug 2021 23:58) (92 - 104)  BP: 126/65 (30 Aug 2021 23:58) (102/67 - 126/65)  BP(mean): --  RR: 16 (30 Aug 2021 23:58) (16 - 18)  SpO2: 100% (30 Aug 2021 23:58) (97% - 100%)    CONSTITUTIONAL: Well-groomed, in no apparent distress, irritated at frequent questions about heart  EYES: No conjunctival or scleral injection, non-icteric; PERRLA and symmetric  ENMT: normal dentition; possible thrush; no pharyngeal injection or exudates, oral mucosa with moist membranes  NECK: Trachea midline without palpable neck mass; thyroid not enlarged and non-tender. No JVD. Negative HJR  RESPIRATORY: Breathing comfortably; no dullness to percussion; lungs CTA without wheeze/rhonchi/rales  CARDIOVASCULAR: +S1S2, RRR, no M/G/R; no lower extremity edema  GASTROINTESTINAL: No palpable masses or tenderness, +BS throughout, no rebound/guarding; no hepatosplenomegaly; no hernia palpated  MUSCULOSKELETAL: +proximal muscle weakness  SKIN: No rashes or ulcers noted; +erythematous bruise of R bicep   NEUROLOGIC: able to move all extremities; sensation intact in LEs b/l to light touch  PSYCHIATRIC: A+O x 3; mood and affect appropriate; appropriate insight and judgment

## 2021-08-31 NOTE — H&P ADULT - PROBLEM SELECTOR PLAN 4
reports taking 10-12 U of daily insulin as well as janumet. reports that A1c had been poorly controlled in the past. Most recent on outpatient paperwork was 8.1. it is possible that patient's dysphagia is related to gastroparesis although less likely.  -f/u A1c  -lantus 5 qhs  -ISS To solids and liquids and without significant symptoms of dyspepsia but with occasional regurgitation. No issue with initiating swallow but does have cough. favor esophageal > oropharyngeal dysphagia. s/p normal outpatient EGD. suspect neuromuscular disease but will need barium swallow first (unless patient has already done it - couldn't find in records).  -will also email GI for input on this as well as colonic thickening  -could consider ENT consult if other workup is negative To solids and liquids and without significant symptoms of dyspepsia but with occasional regurgitation. No issue with initiating swallow but does have cough. favor esophageal > oropharyngeal dysphagia. s/p normal outpatient EGD.  Failed bedside swallow eval.   suspect neuromuscular disease but will need barium swallow first (unless patient has already done it - couldn't find in records).  -will also email GI for input on this as well as colonic thickening  -could consider ENT consult if other workup is negative  - NPO until speech/swallow eval

## 2021-08-31 NOTE — H&P ADULT - PROBLEM SELECTOR PLAN 8
lovenox  regular (soft) diet as tolerated - can switch to consistent carb if sugars poorly controlled. lovenox  NPO until swallow eval

## 2021-08-31 NOTE — H&P ADULT - PROBLEM SELECTOR PLAN 5
Had colonic thickening on CT at OSH. planned for colonoscopy but held off due to elevated troponins and request for LHC prior to C-scope. per report, the thickening could have been due to underdistension.  -given current differential patient would likely benefit from malignancy workup anyway  -would consider repeat imaging prior to procedures at this point reports taking 10-12 U of daily insulin as well as janumet. reports that A1c had been poorly controlled in the past. Most recent on outpatient paperwork was 8.1. it is possible that patient's dysphagia is related to gastroparesis although less likely.  -f/u A1c  -lantus 5 qhs  -ISS

## 2021-08-31 NOTE — PROGRESS NOTE ADULT - PROBLEM SELECTOR PLAN 7
patient has 40lb weight loss in 5 months.  -nutrition consult  -multivitamin/thiamine  -watch for refeeding syndrome

## 2021-08-31 NOTE — H&P ADULT - PROBLEM SELECTOR PLAN 1
troponin elevated on admission with subsequent downtrend 1022->991. EKg with sinus tachycardia and without evidence of ischemia. patient denies exertional (or any) chest pain dyspnea, orthopnea. BNP wnl. recent echo with LVEF 70%, normal RVSF, and no valvular abnormalities per discharge paperwork. very low suspicion for ACS.  -suspect elevation in the setting of myositis vs myocarditis  -appreciate vascular cardiology recs  -repeat EKG and troponin if active chest pain develops  -no need for ACS protocol  -c/w low dose aspirin  -hold statins in the setting of myopathy and elevated ast/alt troponin elevated on admission with subsequent downtrend 1022->991. EKg with sinus tachycardia and without evidence of ischemia. patient denies exertional (or any) chest pain dyspnea, orthopnea. BNP wnl. recent echo with LVEF 70%, normal RVSF, and no valvular abnormalities per discharge paperwork. very low suspicion for ACS.  -suspect elevation in the setting of myositis vs myocarditis  -appreciate vascular cardiology recs  -repeat EKG and troponin if active chest pain develops  -no need for ACS protocol  -c/w low dose aspirin  -hold statins in the setting of myopathy and elevated ast/alt  -echocardiogram for assessment of possible infiltrative disease or cardiomyopathy troponin elevated on admission with subsequent downtrend 1022->991. EKg with sinus tachycardia and without evidence of ischemia. patient denies exertional (or any) chest pain dyspnea, orthopnea. BNP wnl. recent echo with LVEF 70%, normal RVSF, and no valvular abnormalities per discharge paperwork. Very low suspicion for ACS.  -suspect elevation in the setting of myositis vs myocarditis  -appreciate vascular cardiology recs  -repeat EKG and troponin if active chest pain develops  -no need for ACS protocol  -c/w low dose aspirin  -hold statins in the setting of myopathy and elevated ast/alt  -echocardiogram for assessment of possible infiltrative disease or cardiomyopathy  -c/w telemetry monitoring

## 2021-08-31 NOTE — SWALLOW BEDSIDE ASSESSMENT ADULT - COMMENTS
Hx Continued: Unclear underlying etiology. Denies rash, joint pain, fever/chills. W/o dermatologic manifestations. DDX includes Primary polymyositis, immune-mediated necrotizing myopathy, anti-synthetase, and inclusion body myositis. W/o rash dermatomyositis appears less likely. Concern also for underlying paraneoplastic syndrome or other infiltrative disease such as amyloidosis. Less likely to be iatrogenic due to medication as no offending medication (statin or steroids most likely) noted. No clear underlying causea, outpt TSH wnl. Otherwise Ca wnl, less likely parathyroid. Can consider cortisol levels if other labs unrevealing. F/up Vit D. Pending Rheum and GI consult. Swallow eval and barium swallow, EGD at outside hospital normal; consider ENT eval.    Per H&P- No issue with initiating swallow but does have cough. Favor esophageal > oropharyngeal dysphagia. s/p normal outpatient EGD. Failed dysphagia screen 8/31. Suspect neuromuscular disease but will need barium swallow first.     8/30 Vascular Cardiology- Pt with Colon thickening and RLL granuloma on CT Chest/Abd/Pelvis. Patient was referred for LHC prior to colonoscopy. EKG not in chart, reportedly without ST changes per HPI. Echo pending. Plan for cardiac cath today.    8/31 Esophagram- Preliminary  radiograph of the partially visualized chest is unremarkable. The patient swallowed barium without difficulty. The esophagus demonstrates normal distensibility, contour and course. There is no abnormal extrinsic mass effect. Contrast passes freely into the stomach. No gastroesophageal reflux was demonstrated during this examination.    **Not previously known to this service.

## 2021-08-31 NOTE — CONSULT NOTE ADULT - SUBJECTIVE AND OBJECTIVE BOX
Vascular Cardiology Consult Note    SERVICE CONSULT: [phone]        OFFICE [phone]    CC: TIJERINA / Muscle Weakness / Dysphagia    HPI: 60 y/o M w/ DM, w/ history of prior recent admissions to outside public hospitals with TIJERINA, Troponin I 0.4, Sinus Tach, CPK 4000, LDH, aldolase elevated, deemed to have rhabdomyolysis. Reports rapid recent acute physical deterioration (unintentional weight loss 40 pounds in 3 months, new dysphagia, muscular atrophy, now unable to stand or walk w/o assistance). Colon thickening and RLL granuloma on CT Chest/Abd/Pelvis. Patient was referred for [facility] with Dr. Michele prior to colonoscopy. Prior TTE several months ago at outside hospital showed normal EF and no wall motion abnormalities.     Currently patient without C/P. No SOB at rest. Troponin T 1000 to 990 to 1100. CPK 3100. EKG not in chart, reportedly without ST changes per HPI. Echo pending. Vascular Cardiology consulted.    Allergies  No Known Allergies    MEDICATIONS:  aspirin enteric coated 81 milliGRAM(s) Oral daily  enoxaparin Injectable 40 milliGRAM(s) SubCutaneous daily  insulin glargine Injectable (LANTUS) 5 Unit(s) SubCutaneous at bedtime  insulin lispro (ADMELOG) corrective regimen sliding scale   SubCutaneous three times a day before meals  insulin lispro (ADMELOG) corrective regimen sliding scale   SubCutaneous at bedtime  lactated ringers. 1000 milliLiter(s) IV Continuous <Continuous>  multivitamin 1 Tablet(s) Oral daily  thiamine 100 milliGRAM(s) Oral daily    PAST MEDICAL & SURGICAL HISTORY:  Type 2 diabetes mellitus  History of esophagogastroduodenoscopy (EGD)    FAMILY HISTORY:  FH: leukemia (Father)    SOCIAL HISTORY:  unchanged    REVIEW OF SYSTEMS:  CONSTITUTIONAL: No fever  EYES: No eye pain  ENT:  No throat pain  NECK: No pain  RESPIRATORY: TIJERINA  CARDIOVASCULAR: No C/P   GASTROINTESTINAL: No abdominal pain  GENITOURINARY: No hematuria  NEUROLOGICAL: loss of strength  SKIN: rash to L UE  LYMPH Nodes: No enlarged glands noted  ENDOCRINE: No heat or cold intolerance noted  MUSCULOSKELETAL: No swelling or extremity pain  PSYCHIATRIC: No depression, anxiety  HEME/LYMPH: No  bleeding gums  ALLERGY AND IMMUNOLOGIC: No hives    [ x] All others negative	    PHYSICAL EXAM:  T(C): 36.8 (08-31-21 @ 08:48), Max: 37.2 (08-31-21 @ 01:20)  HR: 99 (08-31-21 @ 08:48) (92 - 111)  BP: 91/56 (08-31-21 @ 08:48) (91/56 - 137/75)  RR: 17 (08-31-21 @ 08:48) (16 - 18)  SpO2: 96% (08-31-21 @ 08:48) (96% - 100%)  I&O's Summary    Appearance:  	  HEENT:  NC/AT  Cardiovascular: Regular Rhythm, Tachycardic, S1 and S2   Respiratory: CTA B/L  Psychiatry:  AAO x 3  Gastrointestinal:  Soft, Non-tender, + BS	  Skin: No ecchymoses, No cyanosis	  Neurologic: No focal deficits noted  Extremities: No LE edema, B/L calves soft  Foot Exam: No tissue loss or ulceration    LABS:	 	    CBC Full  -  ( 31 Aug 2021 07:08 )  WBC Count : 12.97 K/uL  Hemoglobin : 9.7 g/dL  Hematocrit : 30.5 %  Platelet Count - Automated : 369 K/uL  Mean Cell Volume : 85.4 fl  Mean Cell Hemoglobin : 27.2 pg  Mean Cell Hemoglobin Concentration : 31.8 gm/dL  Auto Neutrophil # : 10.71 K/uL  Auto Lymphocyte # : 1.68 K/uL  Auto Monocyte # : 0.42 K/uL  Auto Eosinophil # : 0.06 K/uL  Auto Basophil # : 0.04 K/uL  Auto Neutrophil % : 82.5 %  Auto Lymphocyte % : 13.0 %  Auto Monocyte % : 3.2 %  Auto Eosinophil % : 0.5 %  Auto Basophil % : 0.3 %    08-31    133<L>  |  97  |  11  ----------------------------<  139<H>  4.5   |  27  |  0.53  08-30    133<L>  |  94<L>  |  16  ----------------------------<  160<H>  4.6   |  27  |  0.54    Ca    8.8      31 Aug 2021 07:07  Ca    9.2      30 Aug 2021 18:26  Phos  4.0     08-30  Mg     2.0     08-30    TPro  6.3  /  Alb  2.4<L>  /  TBili  0.2  /  DBili  x   /  AST  111<H>  /  ALT  155<H>  /  AlkPhos  73  08-31  TPro  7.3  /  Alb  3.0<L>  /  TBili  0.2  /  DBili  x   /  AST  139<H>  /  ALT  192<H>  /  AlkPhos  81  08-30    Assessment:  1. Troponin T Elevated, concern for ACS      No active C/P or SOB      Atherosclerosis of Aorta and Coronary Arteries / CAD noted on CT  2. Myositis / CPK Elevated / Muscular Atrophy and Weakness  3. Dysphagia  4. Weight Loss   5. DM  6. Colon Thickening on prior CT      Not mentioned on CT read today  7. Anemia     Plan:  1. Echocardiogram this AM.  2. Repeat EKG now.  3. Currently C/P free. Continue telemetry monitoring.  4. Will need Left Heart Cath. Timing to be determined. Atherosclerosis of Coronary Arteries on CT.   5. Recommend repeating CBC and initiating Heparin gtt with monitoring of Hgb.  6. Continue anemia evaluation with Guaiac, Iron Studies, GI consult, and as per Primary Team.   7. Statin on hold due to elevated CPK and concern for myositis.      Rheumatology consultation recommended.  8. Dysphagia evaluation by GI / ENT / Speech and Swallow.  9. Agree with IV fluid hydration and nutrition consult.   10. Recommend PSA and age appropriate CA screening.  11. Discussed with Primary Team.    Thank you  JUAN Witt, MPAS  Vascular Cardiology Service    Please call with any questions:   Service Line: [phone]  Office [phone]  email:  [email or web address]       Vascular Cardiology Consult Note    SERVICE CONSULT: [phone]        OFFICE [phone]    CC: TIJERINA / Muscle Weakness / Dysphagia    HPI: 62 y/o M w/ DM, w/ history of prior recent admissions to outside public hospitals with TIJERINA, Troponin I 0.4, Sinus Tach, CPK 4000, LDH, aldolase elevated, deemed to have rhabdomyolysis. Reports rapid recent acute physical deterioration (unintentional weight loss 40 pounds in 3 months, new dysphagia, muscular atrophy, now unable to stand or walk w/o assistance). Colon thickening and RLL granuloma on CT Chest/Abd/Pelvis. Patient was referred for [facility] with Dr. Michele prior to colonoscopy. Prior TTE several months ago at outside hospital showed normal EF and no wall motion abnormalities.     Currently patient without C/P. No SOB at rest. Troponin T 1000 to 990 to 1100. CPK 3100. EKG not in chart, reportedly without ST changes per HPI. Echo pending. Vascular Cardiology consulted.    Allergies  No Known Allergies    MEDICATIONS:  aspirin enteric coated 81 milliGRAM(s) Oral daily  enoxaparin Injectable 40 milliGRAM(s) SubCutaneous daily  insulin glargine Injectable (LANTUS) 5 Unit(s) SubCutaneous at bedtime  insulin lispro (ADMELOG) corrective regimen sliding scale   SubCutaneous three times a day before meals  insulin lispro (ADMELOG) corrective regimen sliding scale   SubCutaneous at bedtime  lactated ringers. 1000 milliLiter(s) IV Continuous <Continuous>  multivitamin 1 Tablet(s) Oral daily  thiamine 100 milliGRAM(s) Oral daily    PAST MEDICAL & SURGICAL HISTORY:  Type 2 diabetes mellitus  History of esophagogastroduodenoscopy (EGD)    FAMILY HISTORY:  FH: leukemia (Father)    SOCIAL HISTORY:  unchanged    REVIEW OF SYSTEMS:  CONSTITUTIONAL: No fever  EYES: No eye pain  ENT:  No throat pain  NECK: No pain  RESPIRATORY: TIJERINA  CARDIOVASCULAR: No C/P   GASTROINTESTINAL: No abdominal pain  GENITOURINARY: No hematuria  NEUROLOGICAL: loss of strength  SKIN: rash to L UE  LYMPH Nodes: No enlarged glands noted  ENDOCRINE: No heat or cold intolerance noted  MUSCULOSKELETAL: No swelling or extremity pain  PSYCHIATRIC: No depression, anxiety  HEME/LYMPH: No  bleeding gums  ALLERGY AND IMMUNOLOGIC: No hives    [ x] All others negative	    PHYSICAL EXAM:  T(C): 36.8 (08-31-21 @ 08:48), Max: 37.2 (08-31-21 @ 01:20)  HR: 99 (08-31-21 @ 08:48) (92 - 111)  BP: 91/56 (08-31-21 @ 08:48) (91/56 - 137/75)  RR: 17 (08-31-21 @ 08:48) (16 - 18)  SpO2: 96% (08-31-21 @ 08:48) (96% - 100%)  I&O's Summary    Appearance:  	  HEENT:  NC/AT  Cardiovascular: Regular Rhythm, Tachycardic, S1 and S2   Respiratory: CTA B/L  Psychiatry:  AAO x 3  Gastrointestinal:  Soft, Non-tender, + BS	  Skin: No ecchymoses, No cyanosis	  Neurologic: No focal deficits noted  Extremities: No LE edema, B/L calves soft  Foot Exam: No tissue loss or ulceration    LABS:	 	    CBC Full  -  ( 31 Aug 2021 07:08 )  WBC Count : 12.97 K/uL  Hemoglobin : 9.7 g/dL  Hematocrit : 30.5 %  Platelet Count - Automated : 369 K/uL  Mean Cell Volume : 85.4 fl  Mean Cell Hemoglobin : 27.2 pg  Mean Cell Hemoglobin Concentration : 31.8 gm/dL  Auto Neutrophil # : 10.71 K/uL  Auto Lymphocyte # : 1.68 K/uL  Auto Monocyte # : 0.42 K/uL  Auto Eosinophil # : 0.06 K/uL  Auto Basophil # : 0.04 K/uL  Auto Neutrophil % : 82.5 %  Auto Lymphocyte % : 13.0 %  Auto Monocyte % : 3.2 %  Auto Eosinophil % : 0.5 %  Auto Basophil % : 0.3 %    08-31    133<L>  |  97  |  11  ----------------------------<  139<H>  4.5   |  27  |  0.53  08-30    133<L>  |  94<L>  |  16  ----------------------------<  160<H>  4.6   |  27  |  0.54    Ca    8.8      31 Aug 2021 07:07  Ca    9.2      30 Aug 2021 18:26  Phos  4.0     08-30  Mg     2.0     08-30    TPro  6.3  /  Alb  2.4<L>  /  TBili  0.2  /  DBili  x   /  AST  111<H>  /  ALT  155<H>  /  AlkPhos  73  08-31  TPro  7.3  /  Alb  3.0<L>  /  TBili  0.2  /  DBili  x   /  AST  139<H>  /  ALT  192<H>  /  AlkPhos  81  08-30    Assessment:  1. Troponin T Elevated, concern for ACS      No active C/P or SOB      Atherosclerosis of Aorta and Coronary Arteries / CAD noted on CT  2. Myositis / CPK Elevated / Muscular Atrophy and Weakness  3. Dysphagia  4. Weight Loss   5. DM  6. Colon Thickening on prior CT      Not mentioned on CT read today  7. Anemia     Plan:  1. Echocardiogram this AM.  2. Repeat EKG now.  3. Currently C/P free. Continue telemetry monitoring.  4. Will need Left Heart Cath. Timing to be determined. Atherosclerosis of Coronary Arteries on CT.   5. Recommend repeating CBC and initiating Heparin gtt with monitoring of Hgb.  6. Continue anemia evaluation with Guaiac, Iron Studies, GI consult, and as per Primary Team.   7. Statin on hold due to elevated CPK and concern for myositis.      Rheumatology consultation recommended.  8. BB on hold due to soft BP.  9. Dysphagia evaluation by GI / ENT / Speech and Swallow.  10. Agree with IV fluid hydration and nutrition consult.   11. Recommend PSA and age appropriate CA screening.  12. Discussed with Primary Team.    Thank you  JUAN Witt, MPAS  Vascular Cardiology Service    Please call with any questions:   Service Line: [phone]  Office [phone]  email:  [email or web address]       Vascular Cardiology Consult Note    SERVICE CONSULT: [phone]        OFFICE [phone]    CC: TIJERINA / Muscle Weakness / Dysphagia    HPI: 60 y/o M w/ DM, w/ history of prior recent admissions to outside public hospitals with TIJERINA, Troponin I 0.4, Sinus Tach, CPK 4000, LDH, aldolase elevated, deemed to have rhabdomyolysis. Reports rapid recent acute physical deterioration (unintentional weight loss 40 pounds in 3 months, new dysphagia, muscular atrophy, now unable to stand or walk w/o assistance). Colon thickening and RLL granuloma on CT Chest/Abd/Pelvis. Patient was referred for [facility] with Dr. Michele prior to colonoscopy. Prior TTE several months ago at outside hospital showed normal EF and no wall motion abnormalities.     Currently patient without C/P. No SOB at rest. Troponin T 1000 to 990 to 1100. CPK 3100. EKG not in chart, reportedly without ST changes per HPI. Echo pending. Vascular Cardiology consulted.    Allergies  No Known Allergies    MEDICATIONS:  aspirin enteric coated 81 milliGRAM(s) Oral daily  enoxaparin Injectable 40 milliGRAM(s) SubCutaneous daily  insulin glargine Injectable (LANTUS) 5 Unit(s) SubCutaneous at bedtime  insulin lispro (ADMELOG) corrective regimen sliding scale   SubCutaneous three times a day before meals  insulin lispro (ADMELOG) corrective regimen sliding scale   SubCutaneous at bedtime  lactated ringers. 1000 milliLiter(s) IV Continuous <Continuous>  multivitamin 1 Tablet(s) Oral daily  thiamine 100 milliGRAM(s) Oral daily    PAST MEDICAL & SURGICAL HISTORY:  Type 2 diabetes mellitus  History of esophagogastroduodenoscopy (EGD)    FAMILY HISTORY:  FH: leukemia (Father)    SOCIAL HISTORY:  unchanged    REVIEW OF SYSTEMS:  CONSTITUTIONAL: No fever  EYES: No eye pain  ENT:  No throat pain  NECK: No pain  RESPIRATORY: TIJERINA  CARDIOVASCULAR: No C/P   GASTROINTESTINAL: No abdominal pain  GENITOURINARY: No hematuria  NEUROLOGICAL: loss of strength  SKIN: rash to L UE  LYMPH Nodes: No enlarged glands noted  ENDOCRINE: No heat or cold intolerance noted  MUSCULOSKELETAL: No swelling or extremity pain  PSYCHIATRIC: No depression, anxiety  HEME/LYMPH: No  bleeding gums  ALLERGY AND IMMUNOLOGIC: No hives    [ x] All others negative	    PHYSICAL EXAM:  T(C): 36.8 (08-31-21 @ 08:48), Max: 37.2 (08-31-21 @ 01:20)  HR: 99 (08-31-21 @ 08:48) (92 - 111)  BP: 91/56 (08-31-21 @ 08:48) (91/56 - 137/75)  RR: 17 (08-31-21 @ 08:48) (16 - 18)  SpO2: 96% (08-31-21 @ 08:48) (96% - 100%)  I&O's Summary    Appearance:  	  HEENT:  NC/AT  Cardiovascular: Regular Rhythm, Tachycardic, S1 and S2   Respiratory: CTA B/L  Psychiatry:  AAO x 3  Gastrointestinal:  Soft, Non-tender, + BS	  Skin: No ecchymoses, No cyanosis	  Neurologic: No focal deficits noted  Extremities: No LE edema, B/L calves soft  Foot Exam: No tissue loss or ulceration    LABS:	 	    CBC Full  -  ( 31 Aug 2021 07:08 )  WBC Count : 12.97 K/uL  Hemoglobin : 9.7 g/dL  Hematocrit : 30.5 %  Platelet Count - Automated : 369 K/uL  Mean Cell Volume : 85.4 fl  Mean Cell Hemoglobin : 27.2 pg  Mean Cell Hemoglobin Concentration : 31.8 gm/dL  Auto Neutrophil # : 10.71 K/uL  Auto Lymphocyte # : 1.68 K/uL  Auto Monocyte # : 0.42 K/uL  Auto Eosinophil # : 0.06 K/uL  Auto Basophil # : 0.04 K/uL  Auto Neutrophil % : 82.5 %  Auto Lymphocyte % : 13.0 %  Auto Monocyte % : 3.2 %  Auto Eosinophil % : 0.5 %  Auto Basophil % : 0.3 %    08-31    133<L>  |  97  |  11  ----------------------------<  139<H>  4.5   |  27  |  0.53  08-30    133<L>  |  94<L>  |  16  ----------------------------<  160<H>  4.6   |  27  |  0.54    Ca    8.8      31 Aug 2021 07:07  Ca    9.2      30 Aug 2021 18:26  Phos  4.0     08-30  Mg     2.0     08-30    TPro  6.3  /  Alb  2.4<L>  /  TBili  0.2  /  DBili  x   /  AST  111<H>  /  ALT  155<H>  /  AlkPhos  73  08-31  TPro  7.3  /  Alb  3.0<L>  /  TBili  0.2  /  DBili  x   /  AST  139<H>  /  ALT  192<H>  /  AlkPhos  81  08-30    Assessment:  1. Troponin T Elevated, concern for ACS      No active C/P or SOB      Atherosclerosis of Aorta and Coronary Arteries / CAD noted on CT  2. Myositis / CPK Elevated / Muscular Atrophy and Weakness  3. Dysphagia  4. Weight Loss   5. DM  6. Colon Thickening on prior CT      Not mentioned on CT read today  7. Anemia     Plan:  1. Echocardiogram this AM.  2. Repeat EKG now.  3. Currently C/P free. Continue telemetry and symptom monitoring.  4. Will need Left Heart Cath. Plan for cardiac cath today (will plan to limit dye load due to CT scan today)      Informed consent obtained and risks and benefits explained by MD.  5. Recommend repeating CBC and initiating Heparin gtt with monitoring of Hgb.  6. Continue anemia evaluation with Guaiac, Iron Studies, GI consult, and as per Primary Team.   7. Statin on hold due to elevated CPK and concern for myositis.      Rheumatology consultation recommended.  8. BB on hold due to soft BP.  9. Dysphagia evaluation by GI / ENT / Speech and Swallow.  10. Agree with IV fluid hydration. Continue at rate of 75 cc/ hr prior and after cath.  11. Recommend PSA and age appropriate CA screening.  12. Discussed with Primary Team.    Thank you  JUAN Witt, MPAS  Vascular Cardiology Service    Please call with any questions:   Service Line: [phone]  Office [phone]  email:  [email or web address]       Vascular Cardiology Consult Note    SERVICE CONSULT: [phone]        OFFICE [phone]    CC: TIJERINA / Muscle Weakness / Dysphagia    HPI: 62 y/o M w/ DM, w/ history of prior recent admissions to outside public hospitals with TIJERINA, Troponin I 0.4, Sinus Tach, CPK 4000, LDH, aldolase elevated, deemed to have rhabdomyolysis. Reports rapid recent acute physical deterioration (unintentional weight loss 40 pounds in 3 months, new dysphagia, muscular atrophy, now unable to stand or walk w/o assistance). Colon thickening and RLL granuloma on CT Chest/Abd/Pelvis. Patient was referred for [facility] with Dr. Michele prior to colonoscopy. Prior TTE several months ago at outside hospital showed normal EF and no wall motion abnormalities.     Currently patient without C/P. No SOB at rest. Troponin T 1000 to 990 to 1100. CPK 3100. EKG not in chart, reportedly without ST changes per HPI. Echo pending. Vascular Cardiology consulted.    Allergies  No Known Allergies    MEDICATIONS:  aspirin enteric coated 81 milliGRAM(s) Oral daily  enoxaparin Injectable 40 milliGRAM(s) SubCutaneous daily  insulin glargine Injectable (LANTUS) 5 Unit(s) SubCutaneous at bedtime  insulin lispro (ADMELOG) corrective regimen sliding scale   SubCutaneous three times a day before meals  insulin lispro (ADMELOG) corrective regimen sliding scale   SubCutaneous at bedtime  lactated ringers. 1000 milliLiter(s) IV Continuous <Continuous>  multivitamin 1 Tablet(s) Oral daily  thiamine 100 milliGRAM(s) Oral daily    PAST MEDICAL & SURGICAL HISTORY:  Type 2 diabetes mellitus  History of esophagogastroduodenoscopy (EGD)    FAMILY HISTORY:  FH: leukemia (Father)    SOCIAL HISTORY:  unchanged    REVIEW OF SYSTEMS:  CONSTITUTIONAL: No fever  EYES: No eye pain  ENT:  No throat pain  NECK: No pain  RESPIRATORY: TIJERINA  CARDIOVASCULAR: No C/P   GASTROINTESTINAL: No abdominal pain  GENITOURINARY: No hematuria  NEUROLOGICAL: loss of strength  SKIN: rash to L UE  LYMPH Nodes: No enlarged glands noted  ENDOCRINE: No heat or cold intolerance noted  MUSCULOSKELETAL: No swelling or extremity pain  PSYCHIATRIC: No depression, anxiety  HEME/LYMPH: No  bleeding gums  ALLERGY AND IMMUNOLOGIC: No hives    [ x] All others negative	    PHYSICAL EXAM:  T(C): 36.8 (08-31-21 @ 08:48), Max: 37.2 (08-31-21 @ 01:20)  HR: 99 (08-31-21 @ 08:48) (92 - 111)  BP: 91/56 (08-31-21 @ 08:48) (91/56 - 137/75)  RR: 17 (08-31-21 @ 08:48) (16 - 18)  SpO2: 96% (08-31-21 @ 08:48) (96% - 100%)  I&O's Summary    Appearance:  	  HEENT:  NC/AT  Cardiovascular: Regular Rhythm, Tachycardic, S1 and S2   Respiratory: CTA B/L  Psychiatry:  AAO x 3  Gastrointestinal:  Soft, Non-tender, + BS	  Skin: No ecchymoses, No cyanosis	  Neurologic: No focal deficits noted  Extremities: No LE edema, B/L calves soft  Foot Exam: No tissue loss or ulceration    LABS:	 	    CBC Full  -  ( 31 Aug 2021 07:08 )  WBC Count : 12.97 K/uL  Hemoglobin : 9.7 g/dL  Hematocrit : 30.5 %  Platelet Count - Automated : 369 K/uL  Mean Cell Volume : 85.4 fl  Mean Cell Hemoglobin : 27.2 pg  Mean Cell Hemoglobin Concentration : 31.8 gm/dL  Auto Neutrophil # : 10.71 K/uL  Auto Lymphocyte # : 1.68 K/uL  Auto Monocyte # : 0.42 K/uL  Auto Eosinophil # : 0.06 K/uL  Auto Basophil # : 0.04 K/uL  Auto Neutrophil % : 82.5 %  Auto Lymphocyte % : 13.0 %  Auto Monocyte % : 3.2 %  Auto Eosinophil % : 0.5 %  Auto Basophil % : 0.3 %    08-31    133<L>  |  97  |  11  ----------------------------<  139<H>  4.5   |  27  |  0.53  08-30    133<L>  |  94<L>  |  16  ----------------------------<  160<H>  4.6   |  27  |  0.54    Ca    8.8      31 Aug 2021 07:07  Ca    9.2      30 Aug 2021 18:26  Phos  4.0     08-30  Mg     2.0     08-30    TPro  6.3  /  Alb  2.4<L>  /  TBili  0.2  /  DBili  x   /  AST  111<H>  /  ALT  155<H>  /  AlkPhos  73  08-31  TPro  7.3  /  Alb  3.0<L>  /  TBili  0.2  /  DBili  x   /  AST  139<H>  /  ALT  192<H>  /  AlkPhos  81  08-30    Assessment:  1. Troponin T Elevated, concern for ACS      No active C/P or SOB      Atherosclerosis of Aorta and Coronary Arteries / CAD noted on CT  2. Myositis / CPK Elevated / Muscular Atrophy and Weakness  3. Dysphagia  4. Weight Loss   5. DM  6. Colon Thickening on prior CT      Not mentioned on CT read today  7. Anemia / Iron Deficiency Anemia     Plan:  1. Echocardiogram this AM.  2. Repeat EKG now.  3. Currently C/P free. Continue telemetry and symptom monitoring.  4. Will need Left Heart Cath. Plan for cardiac cath today (will plan to limit dye load due to CT scan today).      Informed consent obtained and risks and benefits explained by MD.  5. Recommend CBC and guaiac now.       No active bleeding reported and no bleeding noted on pan CT this AM.      Recommend initiating Heparin gtt with monitoring of Hgb.  6. Continue anemia evaluation.       Start Iron for Iron Deficiency.       GI consulted, and further evaluation and as per Primary Team.   7. Statin on hold due to elevated CPK and concern for myositis.      Rheumatology consultation recommended.  8. BB on hold due to soft BP.  9. Dysphagia evaluation by GI / ENT / Speech and Swallow.  10. Agree with IV fluid hydration. Continue at rate of 75 cc/ hr prior and after cath.       Creatinine normal. Continue to monitor.  11. Recommend PSA and age appropriate CA screening.  12. Discussed with Primary Team and Dr. Michele.    Thank you  JUAN Witt, MPAS  Vascular Cardiology Service    Please call with any questions:   Service Line: [phone]  Office [phone]  email:  [email or web address]       Vascular Cardiology Consult Note    SERVICE CONSULT: [phone]        OFFICE [phone]    CC: TIJERINA / Muscle Weakness / Dysphagia    HPI: 60 y/o M w/ DM, w/ history of prior recent admissions to outside public hospitals with TIJERINA, Troponin I 0.4, Sinus Tach, CPK 4000, LDH, aldolase elevated, deemed to have rhabdomyolysis. Reports rapid recent acute physical deterioration (unintentional weight loss 40 pounds in 3 months, new dysphagia, muscular atrophy, now unable to stand or walk w/o assistance). Colon thickening and RLL granuloma on CT Chest/Abd/Pelvis. Patient was referred for [facility] with Dr. Michele prior to colonoscopy. Prior TTE several months ago at outside hospital showed normal EF and no wall motion abnormalities.     Currently patient without C/P. No SOB at rest. Troponin T 1000 to 990 to 1100. CPK 3100. EKG not in chart, reportedly without ST changes per HPI. Echo pending. Vascular Cardiology consulted.    Allergies  No Known Allergies    MEDICATIONS:  aspirin enteric coated 81 milliGRAM(s) Oral daily  enoxaparin Injectable 40 milliGRAM(s) SubCutaneous daily  insulin glargine Injectable (LANTUS) 5 Unit(s) SubCutaneous at bedtime  insulin lispro (ADMELOG) corrective regimen sliding scale   SubCutaneous three times a day before meals  insulin lispro (ADMELOG) corrective regimen sliding scale   SubCutaneous at bedtime  lactated ringers. 1000 milliLiter(s) IV Continuous <Continuous>  multivitamin 1 Tablet(s) Oral daily  thiamine 100 milliGRAM(s) Oral daily    PAST MEDICAL & SURGICAL HISTORY:  Type 2 diabetes mellitus  History of esophagogastroduodenoscopy (EGD)    FAMILY HISTORY:  FH: leukemia (Father)    SOCIAL HISTORY:  unchanged    REVIEW OF SYSTEMS:  CONSTITUTIONAL: No fever  EYES: No eye pain  ENT:  No throat pain  NECK: No pain  RESPIRATORY: TIJERINA  CARDIOVASCULAR: No C/P   GASTROINTESTINAL: No abdominal pain  GENITOURINARY: No hematuria  NEUROLOGICAL: loss of strength  SKIN: rash to L UE  LYMPH Nodes: No enlarged glands noted  ENDOCRINE: No heat or cold intolerance noted  MUSCULOSKELETAL: No swelling or extremity pain  PSYCHIATRIC: No depression, anxiety  HEME/LYMPH: No  bleeding gums  ALLERGY AND IMMUNOLOGIC: No hives    [ x] All others negative	    PHYSICAL EXAM:  T(C): 36.8 (08-31-21 @ 08:48), Max: 37.2 (08-31-21 @ 01:20)  HR: 99 (08-31-21 @ 08:48) (92 - 111)  BP: 91/56 (08-31-21 @ 08:48) (91/56 - 137/75)  RR: 17 (08-31-21 @ 08:48) (16 - 18)  SpO2: 96% (08-31-21 @ 08:48) (96% - 100%)  I&O's Summary    Appearance:  	  HEENT:  NC/AT  Cardiovascular: Regular Rhythm, Tachycardic, S1 and S2   Respiratory: CTA B/L  Psychiatry:  AAO x 3  Gastrointestinal:  Soft, Non-tender, + BS	  Skin: No ecchymoses, No cyanosis	  Neurologic: No focal deficits noted  Extremities: No LE edema, B/L calves soft  Foot Exam: No tissue loss or ulceration    LABS:	 	    CBC Full  -  ( 31 Aug 2021 07:08 )  WBC Count : 12.97 K/uL  Hemoglobin : 9.7 g/dL  Hematocrit : 30.5 %  Platelet Count - Automated : 369 K/uL  Mean Cell Volume : 85.4 fl  Mean Cell Hemoglobin : 27.2 pg  Mean Cell Hemoglobin Concentration : 31.8 gm/dL  Auto Neutrophil # : 10.71 K/uL  Auto Lymphocyte # : 1.68 K/uL  Auto Monocyte # : 0.42 K/uL  Auto Eosinophil # : 0.06 K/uL  Auto Basophil # : 0.04 K/uL  Auto Neutrophil % : 82.5 %  Auto Lymphocyte % : 13.0 %  Auto Monocyte % : 3.2 %  Auto Eosinophil % : 0.5 %  Auto Basophil % : 0.3 %    08-31    133<L>  |  97  |  11  ----------------------------<  139<H>  4.5   |  27  |  0.53  08-30    133<L>  |  94<L>  |  16  ----------------------------<  160<H>  4.6   |  27  |  0.54    Ca    8.8      31 Aug 2021 07:07  Ca    9.2      30 Aug 2021 18:26  Phos  4.0     08-30  Mg     2.0     08-30    TPro  6.3  /  Alb  2.4<L>  /  TBili  0.2  /  DBili  x   /  AST  111<H>  /  ALT  155<H>  /  AlkPhos  73  08-31  TPro  7.3  /  Alb  3.0<L>  /  TBili  0.2  /  DBili  x   /  AST  139<H>  /  ALT  192<H>  /  AlkPhos  81  08-30    Assessment:  1. Troponin T Elevated, concern for ACS      No active C/P or SOB      Atherosclerosis of Aorta and Coronary Arteries / CAD noted on CT  2. Myositis / CPK Elevated / Muscular Atrophy and Weakness  3. Dysphagia  4. Weight Loss   5. DM  6. Colon Thickening on prior CT      Not mentioned on CT read today  7. Anemia / Iron Deficiency Anemia     Plan:  1. Echocardiogram this AM.  2. Repeat EKG now.  3. Currently C/P free. Continue telemetry and symptom monitoring.  4. Will need Left Heart Cath. Plan for cardiac cath today (will plan to limit dye load due to CT scan today).      Informed consent obtained and risks and benefits explained by MD.      Continue ASA 81 mg daily. Plavix will be given in cath lab if needed.  5. Recommend CBC and guaiac now.       No active bleeding reported and no bleeding noted on pan CT this AM.      Recommend initiating Heparin gtt with monitoring of Hgb.  6. Continue anemia evaluation.       Start Iron for Iron Deficiency.       GI consulted, and further evaluation and as per Primary Team.   7. Statin on hold due to elevated CPK and concern for myositis.      Rheumatology consultation recommended.  8. BB on hold due to soft BP.  9. Dysphagia evaluation by GI / ENT / Speech and Swallow.  10. Agree with IV fluid hydration. Continue at rate of 75 cc/ hr prior and after cath.       Creatinine normal. Continue to monitor.  11. Recommend PSA and age appropriate CA screening.  12. Discussed with Primary Team and Dr. Michele.    Thank you  JUAN Witt, MPAS  Vascular Cardiology Service    Please call with any questions:   Service Line: [phone]  Office [phone]  email:  [email or web address]

## 2021-08-31 NOTE — PROGRESS NOTE ADULT - PROBLEM SELECTOR PLAN 5
reports taking 10-12 U of daily insulin as well as janumet. reports that A1c had been poorly controlled in the past. Most recent on outpatient paperwork was 8.1. it is possible that patient's dysphagia is related to gastroparesis although less likely.  -f/u A1c  -lantus 5 qhs  -ISS Reports taking 10-12 U of daily insulin as well as janumet. reports that A1c had been poorly controlled in the past. Most recent on outpatient paperwork was 8.1. it is possible that patient's dysphagia is related to gastroparesis although less likely.  - A1C 7.2  -lantus 5 qhs  -ISS

## 2021-08-31 NOTE — CONSULT NOTE ADULT - ASSESSMENT
62 yo M with PMHx of Diabetes and short term statin use presents with subacute progressively worsening proximal muscle weakness, dysphagia and significant weight loss. Exam consistent with severe muscle weakness without pain. No sensory abnormalities. Creatinine Kinase significantly elevated TSH wnl. AST and ALT elevated. CT Chest A/P, EGD (at [facility]), Esophogram not concerning for malignancy. Primary diagnosis favors a myositis with pharyngeal weakness to explain the dysphagia. Differential of myositis includes polymyositis, dermatomyositis (lack of skin findings does not support this), inclusion body myositis (usually a distal myositis), statin induced myopathy (unlikely as he hasn't been on a statin in a few years) vs. necrotizing autoimmune myopathy. Also to be considered are infection and malignancy.     Plan  -  62 yo M with PMHx of Diabetes and short term statin use presents with subacute progressively worsening proximal muscle weakness, dysphagia and significant weight loss. Exam consistent with severe muscle weakness without pain. No sensory abnormalities. Creatinine Kinase significantly elevated TSH wnl. AST and ALT elevated. CT Chest A/P, EGD (at [facility]), Esophogram not concerning for malignancy. Primary diagnosis favors a myositis with pharyngeal weakness to explain the dysphagia. Differential of myositis includes polymyositis, dermatomyositis (lack of skin findings does not support this), inclusion body myositis (usually a distal myositis), statin induced myopathy (unlikely as he hasn't been on a statin in a few years) vs. necrotizing autoimmune myopathy. Also to be considered are infection and malignancy.     Plan  - Obtain Myomarker panel and HMG-CoA Reductase antibody (will order)  - f/u Quantiferon, obtain IgA and complete Hep B workup (will order)  - obtain MRI of left thigh (can be ordered as MRI Lower Ext Non-joint w/ IV Cont, Left)  - likely will eventually need muscle biopsy  - will consider tomorrow whether patient to receive pulse dose steroids or 1mg/kg. Will also consider IVIG if needed  - obtain speech and swallow eval  - f/u infectious workup  - f/u cardiac cath outcome    Plan discussed with Attending; However, patient undergoing a cardiac cath procedure. Attending to see patient tomorrow.    Sigifredo Prado MD  Rheumatology Fellow, PGY-4  Cell: [phone]

## 2021-08-31 NOTE — PROGRESS NOTE ADULT - PROBLEM SELECTOR PLAN 4
To solids and liquids and without significant symptoms of dyspepsia but with occasional regurgitation. No issue with initiating swallow but does have cough. favor esophageal > oropharyngeal dysphagia. s/p normal outpatient EGD.  Failed bedside swallow eval.   suspect neuromuscular disease but will need barium swallow first (unless patient has already done it - couldn't find in records).  -will also email GI for input on this as well as colonic thickening  -could consider ENT consult if other workup is negative  - NPO until speech/swallow eval To solids and liquids and without significant symptoms of dyspepsia but with occasional regurgitation. No issue with initiating swallow but does have cough. favor esophageal > oropharyngeal dysphagia. s/p normal outpatient EGD.  Failed bedside swallow eval.   suspect neuromuscular disease but will need barium swallow first (unless patient has already done it - couldn't find in records).  -will also email GI for input on this as well as colonic thickening  -could consider ENT consult if other workup is negative  - NPO until speech/swallow eval  - maintenance IVF overnight

## 2021-08-31 NOTE — PROGRESS NOTE ADULT - PROBLEM SELECTOR PLAN 1
troponin elevated on admission with subsequent downtrend 1022->991. EKg with sinus tachycardia and without evidence of ischemia. patient denies exertional (or any) chest pain dyspnea, orthopnea. BNP wnl. recent echo with LVEF 70%, normal RVSF, and no valvular abnormalities per discharge paperwork. Very low suspicion for ACS.  -suspect elevation in the setting of myositis vs myocarditis  -appreciate vascular cardiology recs  -repeat EKG and troponin if active chest pain develops  -no need for ACS protocol  -c/w low dose aspirin  -hold statins in the setting of myopathy and elevated ast/alt  -echocardiogram for assessment of possible infiltrative disease or cardiomyopathy  -c/w telemetry monitoring troponin elevated on admission with subsequent downtrend 1022->991. EKg with sinus tachycardia and without evidence of ischemia. patient denies exertional (or any) chest pain dyspnea, orthopnea. BNP wnl. recent echo with LVEF 70%, normal RVSF, and no valvular abnormalities per discharge paperwork. Very low suspicion for ACS.  -suspect elevation in the setting of myositis vs myocarditis  - troponin uptrending, trend  - ECHO with hyperdynamic EF=75-80%, mild diastolic dysfunction grade I  - C today  - c/w low dose aspirin  - continue to hold statins in the setting of myopathy and elevated AST/ALT  - c/w telemetry monitoring  - pending cath report

## 2021-08-31 NOTE — CONSULT NOTE ADULT - SUBJECTIVE AND OBJECTIVE BOX
KARLA LORENZ  [id number]    HISTORY OF PRESENT ILLNESS:  60 yo M with PMHx significant for DM presents with weakness, dysphagia and weight loss. FIve months ago, patient was in his usual state of health. He began having dysphagia that progressively worsened. Both solids and liquids would cause him to choke. This was accompanied by an approximately 40lb weight loss. During this time he also began having weakness in his "biceps" and legs. He cannot get up from the seated position. He cannot lift his arms up to reach for things. He is able to use his feet to drive a car and can use his hands to open and close things. He denies any joint or muscle pain. Patient recalls a rash in the winter behind his knee and near his groin. He admits to dyspnea on exertion but denies chest pain. Patient had been to [facility] this past month but a diagnosis has not been made. Patient denies loss of sensation, raynauds, change in urine. He further denies vision problems, abdominal pain, n/v/d. Patient denies recent travel. He admits to statin use for a short length of time a few years ago.    Per H&P, charting from [facility] revealed elevated CK, elevated aldolase, elevated troponin. Negative for (SAMIA, Huertas, RNP, DSDNA with normal complements, Centromere, SCL70). EGD without significant abnormalities and MRI L Spine with mild L4-L5-S1 disease.    PAST MEDICAL & SURGICAL HISTORY:  Type 2 diabetes mellitus    History of esophagogastroduodenoscopy (EGD)        Review of Systems:  Gen:  No fevers/chills, Positive for weight loss  HEENT: No blurry vision, positive for difficulty swallowing  CVS: No chest pain/palpitations  Resp: No SOB, positive for dyspnea on exertion  GI: No N/V/C/D/abdominal pain  MSK: Positive for muscle weakness, negative for joint pain or swelling  Skin: No new rashes  Neuro: No headaches    MEDICATIONS  (STANDING):  aspirin enteric coated 81 milliGRAM(s) Oral daily  dextrose 40% Gel 15 Gram(s) Oral once  dextrose 5%. 1000 milliLiter(s) (50 mL/Hr) IV Continuous <Continuous>  dextrose 5%. 1000 milliLiter(s) (100 mL/Hr) IV Continuous <Continuous>  dextrose 50% Injectable 25 Gram(s) IV Push once  dextrose 50% Injectable 12.5 Gram(s) IV Push once  dextrose 50% Injectable 25 Gram(s) IV Push once  enoxaparin Injectable 40 milliGRAM(s) SubCutaneous daily  glucagon  Injectable 1 milliGRAM(s) IntraMuscular once  insulin glargine Injectable (LANTUS) 5 Unit(s) SubCutaneous at bedtime  insulin lispro (ADMELOG) corrective regimen sliding scale   SubCutaneous three times a day before meals  insulin lispro (ADMELOG) corrective regimen sliding scale   SubCutaneous at bedtime  lactated ringers. 1000 milliLiter(s) (75 mL/Hr) IV Continuous <Continuous>  multivitamin 1 Tablet(s) Oral daily  thiamine 100 milliGRAM(s) Oral daily    MEDICATIONS  (PRN):      Allergies    No Known Allergies    Intolerances        PERTINENT MEDICATION HISTORY:    SOCIAL HISTORY: Lives with wife and son, Denies toxic habits  OCCUPATION: Works in auto parts store  TRAVEL HISTORY: Denies recent travel    FAMILY HISTORY:  FH: leukemia (Father)  No hx of muscle disease in family      Vital Signs Last 24 Hrs  T(C): 36.7 (31 Aug 2021 17:30), Max: 37.2 (31 Aug 2021 01:20)  T(F): 98 (31 Aug 2021 17:30), Max: 99 (31 Aug 2021 01:20)  HR: 102 (31 Aug 2021 18:00) (99 - 111)  BP: 108/56 (31 Aug 2021 18:00) (91/56 - 137/75)  BP(mean): --  RR: 18 (31 Aug 2021 18:00) (16 - 18)  SpO2: 95% (31 Aug 2021 18:00) (95% - 100%)    Daily     Daily     Physical Exam:  General: Tired appearing  HEENT: EOMI, MMM  CVS: +S1/S2, RRR  Resp: CTA b/l  GI: Soft, NT/ND  MSK: Inability to raise arms at all, Inability to raise legs at all.  strength 5/5. Able to flex wrist and elbow b/l. Able to flex hip b/l with 5/5 muscle strenth. Dorsi and plantar flexion extension 5/5 strength  Neuro: AAOx3, no sensory impariments  Skin: no  rashes    LABS:                        9.7    12.97 )-----------( 369      ( 31 Aug 2021 07:08 )             30.5     08-31    133<L>  |  97  |  11  ----------------------------<  139<H>  4.5   |  27  |  0.53    Ca    8.8      31 Aug 2021 07:07  Phos  4.0     08-  Mg     2.0         TPro  5.7<L>  /  Alb  x   /  TBili  x   /  DBili  x   /  AST  x   /  ALT  x   /  AlkPhos  x       PT/INR - ( 31 Aug 2021 07:13 )   PT: 14.2 sec;   INR: 1.19 ratio         PTT - ( 31 Aug 2021 07:13 )  PTT:27.4 sec  Urinalysis Basic - ( 30 Aug 2021 22:02 )    Color: Light Yellow / Appearance: Clear / S.009 / pH: x  Gluc: x / Ketone: Negative  / Bili: Negative / Urobili: Negative   Blood: x / Protein: Trace / Nitrite: Negative   Leuk Esterase: Negative / RBC: 1 /hpf / WBC 0 /HPF   Sq Epi: x / Non Sq Epi: 0 /hpf / Bacteria: Negative        A1C with Estimated Average Glucose Result: 7.2:   Vitamin B12, Serum: 1366 pg/mL (21 @ 10:12)   Thyroid Stimulating Hormone, Serum: 2.51 uIU/mL (21 @ 10:12)   Hepatitis C Virus Interpretation: Nonreact: Hepatitis C AB   Creatine Kinase, Serum: 3115: test repeated U/L   CKMB Units: 153.1 ng/mL (21 @ 09:12)   Troponin T, High Sensitivity Result: 1191: Specimen not hemolyzed     RADIOLOGY & ADDITIONAL STUDIES:  < from: Xray Esophagram Single Contrast (21 @ 11:56) >    EXAM:  XR ESOPH SNGL CON STUDY                            PROCEDURE DATE:  2021            INTERPRETATION:  CLINICAL INFORMATION: Progressive dysphagia to solids and liquids for 5 months.    TECHNIQUE: An esophagram was performed under fluoroscopy utilizing thin barium as the contrast agent and multiple spot fluoroscopic images were taken.    Fluoro time: 0.9 minutes    FINDINGS:  Preliminary  radiograph of the partially visualized chest is unremarkable.    The patient swallowed barium without difficulty.    The esophagus demonstrates normal distensibility, contour and course. There is no abnormal extrinsic mass effect. Contrast passes freely into the stomach. No gastroesophageal reflux was demonstrated during this examination.    IMPRESSION:  No evidence of esophageal stricture or mass.    --- End of Report ---        < end of copied text >  < from: CT Abdomen and Pelvis w/ IV Cont (21 @ 10:03) >    EXAM:  CT ABDOMEN AND PELVIS IC                          EXAM:  CT CHEST IC                            PROCEDURE DATE:  2021            INTERPRETATION:  CLINICAL INFORMATION: Weight loss, fatigue, muscle weakness. Malignancy evaluation.    COMPARISON: None.    CONTRAST/COMPLICATIONS:  IV Contrast: Omnipaque 350.  90 cc administered   10 cc discarded  Oral Contrast: NONE  Complications: None reported at time of study completion    PROCEDURE:  CT of the Chest, Abdomen and Pelvis was performed.  Sagittal and coronal reformats were performed.    FINDINGS:  CHEST:  LUNGS AND LARGE AIRWAYS: Patent central airways. No parenchymal consolidation. Right lower lobe calcified granuloma.  PLEURA: No pleural effusion.  VESSELS: Atherosclerotic changes of the aorta and coronary arteries.  HEART: Heart size is normal. No pericardial effusion.  MEDIASTINUM AND ANYA: Small calcified lymph nodes in the subcarinal and right hilar region, may be the sequela of prior granulomatous disease.  CHEST WALL AND LOWER NECK: Within normal limits.    ABDOMEN AND PELVIS:  LIVER: Within normal limits.  BILE DUCTS: Normal caliber.  GALLBLADDER: Within normal limits.  SPLEEN: Within normal limits.  PANCREAS: Within normal limits.  ADRENALS: Within normal limits.  KIDNEYS/URETERS: Bilateral renal cysts and subcentimeter hypodensities too small to characterize. No hydronephrosis.    BLADDER: Within normal limits.  REPRODUCTIVE ORGANS: Prostate is mildly enlarged.    BOWEL: No bowel obstruction. Appendix is normal.  PERITONEUM: No ascites.  VESSELS: Atherosclerotic changes.  RETROPERITONEUM/LYMPH NODES: No lymphadenopathy.  ABDOMINAL WALL: Small fat-containing umbilical hernia.  BONES: Within normal limits.    IMPRESSION:    No CT evidence of intrathoracicor intra-abdominal malignancy.    --- End of Report ---        < end of copied text >   KARLA LORENZ  [id number].    HISTORY OF PRESENT ILLNESS:  60 yo M with PMHx significant for DM presents with weakness, dysphagia and weight loss. FIve months ago, patient was in his usual state of health. He began having dysphagia that progressively worsened. Both solids and liquids would cause him to choke. This was accompanied by an approximately 40lb weight loss. During this time he also began having weakness in his "biceps" and legs. He cannot get up from the seated position. He cannot lift his arms up to reach for things. He is able to use his feet to drive a car and can use his hands to open and close things. He denies any joint or muscle pain. Patient recalls a rash in the winter behind his knee and near his groin. He admits to dyspnea on exertion but denies chest pain. Patient had been to [facility] this past month but a diagnosis has not been made. Patient denies loss of sensation, raynauds, change in urine. He further denies vision problems, abdominal pain, n/v/d. Patient denies recent travel. He admits to statin use for a short length of time a few years ago.    Per H&P, charting from [facility] revealed elevated CK, elevated aldolase, elevated troponin. Negative for (SAMIA, Huertas, RNP, DSDNA with normal complements, Centromere, SCL70). EGD without significant abnormalities and MRI L Spine with mild L4-L5-S1 disease.    PAST MEDICAL & SURGICAL HISTORY:  Type 2 diabetes mellitus    History of esophagogastroduodenoscopy (EGD)        Review of Systems:  Gen:  No fevers/chills, Positive for weight loss  HEENT: No blurry vision, positive for difficulty swallowing  CVS: No chest pain/palpitations  Resp: No SOB, positive for dyspnea on exertion  GI: No N/V/C/D/abdominal pain  MSK: Positive for muscle weakness, negative for joint pain or swelling  Skin: No new rashes  Neuro: No headaches    MEDICATIONS  (STANDING):  aspirin enteric coated 81 milliGRAM(s) Oral daily  dextrose 40% Gel 15 Gram(s) Oral once  dextrose 5%. 1000 milliLiter(s) (50 mL/Hr) IV Continuous <Continuous>  dextrose 5%. 1000 milliLiter(s) (100 mL/Hr) IV Continuous <Continuous>  dextrose 50% Injectable 25 Gram(s) IV Push once  dextrose 50% Injectable 12.5 Gram(s) IV Push once  dextrose 50% Injectable 25 Gram(s) IV Push once  enoxaparin Injectable 40 milliGRAM(s) SubCutaneous daily  glucagon  Injectable 1 milliGRAM(s) IntraMuscular once  insulin glargine Injectable (LANTUS) 5 Unit(s) SubCutaneous at bedtime  insulin lispro (ADMELOG) corrective regimen sliding scale   SubCutaneous three times a day before meals  insulin lispro (ADMELOG) corrective regimen sliding scale   SubCutaneous at bedtime  lactated ringers. 1000 milliLiter(s) (75 mL/Hr) IV Continuous <Continuous>  multivitamin 1 Tablet(s) Oral daily  thiamine 100 milliGRAM(s) Oral daily    MEDICATIONS  (PRN):      Allergies    No Known Allergies    Intolerances        PERTINENT MEDICATION HISTORY:    SOCIAL HISTORY: Lives with wife and son, Denies toxic habits  OCCUPATION: Works in auto parts store  TRAVEL HISTORY: Denies recent travel    FAMILY HISTORY:  FH: leukemia (Father)  No hx of muscle disease in family      Vital Signs Last 24 Hrs  T(C): 36.7 (31 Aug 2021 17:30), Max: 37.2 (31 Aug 2021 01:20)  T(F): 98 (31 Aug 2021 17:30), Max: 99 (31 Aug 2021 01:20)  HR: 102 (31 Aug 2021 18:00) (99 - 111)  BP: 108/56 (31 Aug 2021 18:00) (91/56 - 137/75)  BP(mean): --  RR: 18 (31 Aug 2021 18:00) (16 - 18)  SpO2: 95% (31 Aug 2021 18:00) (95% - 100%)    Daily     Daily     Physical Exam:  General: Tired appearing  HEENT: EOMI, MMM  CVS: +S1/S2, RRR  Resp: CTA b/l  GI: Soft, NT/ND  MSK: Inability to raise arms at all, Inability to raise legs at all.  strength 5/5. Able to flex wrist and elbow b/l. Able to flex hip b/l with 5/5 muscle strenth. Dorsi and plantar flexion extension 5/5 strength  Neuro: AAOx3, no sensory impariments  Skin: no  rashes    LABS:                        9.7    12.97 )-----------( 369      ( 31 Aug 2021 07:08 )             30.5     08-31    133<L>  |  97  |  11  ----------------------------<  139<H>  4.5   |  27  |  0.53    Ca    8.8      31 Aug 2021 07:07  Phos  4.0       Mg     2.0         TPro  5.7<L>  /  Alb  x   /  TBili  x   /  DBili  x   /  AST  x   /  ALT  x   /  AlkPhos  x       PT/INR - ( 31 Aug 2021 07:13 )   PT: 14.2 sec;   INR: 1.19 ratio         PTT - ( 31 Aug 2021 07:13 )  PTT:27.4 sec  Urinalysis Basic - ( 30 Aug 2021 22:02 )    Color: Light Yellow / Appearance: Clear / S.009 / pH: x  Gluc: x / Ketone: Negative  / Bili: Negative / Urobili: Negative   Blood: x / Protein: Trace / Nitrite: Negative   Leuk Esterase: Negative / RBC: 1 /hpf / WBC 0 /HPF   Sq Epi: x / Non Sq Epi: 0 /hpf / Bacteria: Negative        A1C with Estimated Average Glucose Result: 7.2:   Vitamin B12, Serum: 1366 pg/mL (21 @ 10:12)   Thyroid Stimulating Hormone, Serum: 2.51 uIU/mL (21 @ 10:12)   Hepatitis C Virus Interpretation: Nonreact: Hepatitis C AB   Creatine Kinase, Serum: 3115: test repeated U/L   CKMB Units: 153.1 ng/mL (21 @ 09:12)   Troponin T, High Sensitivity Result: 1191: Specimen not hemolyzed     RADIOLOGY & ADDITIONAL STUDIES:  < from: Xray Esophagram Single Contrast (21 @ 11:56) >    EXAM:  XR ESOPH SNGL CON STUDY                            PROCEDURE DATE:  2021            INTERPRETATION:  CLINICAL INFORMATION: Progressive dysphagia to solids and liquids for 5 months.    TECHNIQUE: An esophagram was performed under fluoroscopy utilizing thin barium as the contrast agent and multiple spot fluoroscopic images were taken.    Fluoro time: 0.9 minutes    FINDINGS:  Preliminary  radiograph of the partially visualized chest is unremarkable.    The patient swallowed barium without difficulty.    The esophagus demonstrates normal distensibility, contour and course. There is no abnormal extrinsic mass effect. Contrast passes freely into the stomach. No gastroesophageal reflux was demonstrated during this examination.    IMPRESSION:  No evidence of esophageal stricture or mass.    --- End of Report ---        < end of copied text >  < from: CT Abdomen and Pelvis w/ IV Cont (21 @ 10:03) >    EXAM:  CT ABDOMEN AND PELVIS IC                          EXAM:  CT CHEST IC                            PROCEDURE DATE:  2021            INTERPRETATION:  CLINICAL INFORMATION: Weight loss, fatigue, muscle weakness. Malignancy evaluation.    COMPARISON: None.    CONTRAST/COMPLICATIONS:  IV Contrast: Omnipaque 350.  90 cc administered   10 cc discarded  Oral Contrast: NONE  Complications: None reported at time of study completion    PROCEDURE:  CT of the Chest, Abdomen and Pelvis was performed.  Sagittal and coronal reformats were performed.    FINDINGS:  CHEST:  LUNGS AND LARGE AIRWAYS: Patent central airways. No parenchymal consolidation. Right lower lobe calcified granuloma.  PLEURA: No pleural effusion.  VESSELS: Atherosclerotic changes of the aorta and coronary arteries.  HEART: Heart size is normal. No pericardial effusion.  MEDIASTINUM AND ANYA: Small calcified lymph nodes in the subcarinal and right hilar region, may be the sequela of prior granulomatous disease.  CHEST WALL AND LOWER NECK: Within normal limits.    ABDOMEN AND PELVIS:  LIVER: Within normal limits.  BILE DUCTS: Normal caliber.  GALLBLADDER: Within normal limits.  SPLEEN: Within normal limits.  PANCREAS: Within normal limits.  ADRENALS: Within normal limits.  KIDNEYS/URETERS: Bilateral renal cysts and subcentimeter hypodensities too small to characterize. No hydronephrosis.    BLADDER: Within normal limits.  REPRODUCTIVE ORGANS: Prostate is mildly enlarged.    BOWEL: No bowel obstruction. Appendix is normal.  PERITONEUM: No ascites.  VESSELS: Atherosclerotic changes.  RETROPERITONEUM/LYMPH NODES: No lymphadenopathy.  ABDOMINAL WALL: Small fat-containing umbilical hernia.  BONES: Within normal limits.    IMPRESSION:    No CT evidence of intrathoracicor intra-abdominal malignancy.    --- End of Report ---        < end of copied text >

## 2021-09-01 LAB
24R-OH-CALCIDIOL SERPL-MCNC: 23 NG/ML — LOW (ref 30–80)
ALBUMIN SERPL ELPH-MCNC: 2.8 G/DL — LOW (ref 3.3–5)
ALP SERPL-CCNC: 89 U/L — SIGNIFICANT CHANGE UP (ref 40–120)
ALT FLD-CCNC: 174 U/L — HIGH (ref 10–45)
ANA TITR SER: NEGATIVE — SIGNIFICANT CHANGE UP
ANION GAP SERPL CALC-SCNC: 17 MMOL/L — SIGNIFICANT CHANGE UP (ref 5–17)
AST SERPL-CCNC: 125 U/L — HIGH (ref 10–40)
BASOPHILS # BLD AUTO: 0.04 K/UL — SIGNIFICANT CHANGE UP (ref 0–0.2)
BASOPHILS NFR BLD AUTO: 0.3 % — SIGNIFICANT CHANGE UP (ref 0–2)
BILIRUB SERPL-MCNC: 0.4 MG/DL — SIGNIFICANT CHANGE UP (ref 0.2–1.2)
BUN SERPL-MCNC: 13 MG/DL — SIGNIFICANT CHANGE UP (ref 7–23)
CALCIUM SERPL-MCNC: 9.7 MG/DL — SIGNIFICANT CHANGE UP (ref 8.4–10.5)
CHLORIDE SERPL-SCNC: 96 MMOL/L — SIGNIFICANT CHANGE UP (ref 96–108)
CK MB BLD-MCNC: 6.4 % — HIGH (ref 0–3.5)
CK MB CFR SERPL CALC: 206.6 NG/ML — HIGH (ref 0–6.7)
CK SERPL-CCNC: 3244 U/L — HIGH (ref 30–200)
CMV DNA CSF QL NAA+PROBE: SIGNIFICANT CHANGE UP
CO2 SERPL-SCNC: 22 MMOL/L — SIGNIFICANT CHANGE UP (ref 22–31)
COVID-19 SPIKE DOMAIN AB INTERP: POSITIVE
COVID-19 SPIKE DOMAIN ANTIBODY RESULT: >250 U/ML — HIGH
CREAT SERPL-MCNC: 0.53 MG/DL — SIGNIFICANT CHANGE UP (ref 0.5–1.3)
ENA JO1 AB SER-ACNC: <0.2 AI — SIGNIFICANT CHANGE UP
EOSINOPHIL # BLD AUTO: 0.07 K/UL — SIGNIFICANT CHANGE UP (ref 0–0.5)
EOSINOPHIL NFR BLD AUTO: 0.5 % — SIGNIFICANT CHANGE UP (ref 0–6)
FERRITIN SERPL-MCNC: 704 NG/ML — HIGH (ref 30–400)
FOLATE SERPL-MCNC: 18.5 NG/ML — SIGNIFICANT CHANGE UP
GAMMA INTERFERON BACKGROUND BLD IA-ACNC: 0.04 IU/ML — SIGNIFICANT CHANGE UP
GLUCOSE BLDC GLUCOMTR-MCNC: 116 MG/DL — HIGH (ref 70–99)
GLUCOSE BLDC GLUCOMTR-MCNC: 135 MG/DL — HIGH (ref 70–99)
GLUCOSE BLDC GLUCOMTR-MCNC: 160 MG/DL — HIGH (ref 70–99)
GLUCOSE BLDC GLUCOMTR-MCNC: 178 MG/DL — HIGH (ref 70–99)
GLUCOSE SERPL-MCNC: 138 MG/DL — HIGH (ref 70–99)
HBV CORE AB SER-ACNC: SIGNIFICANT CHANGE UP
HBV SURFACE AB SER-ACNC: SIGNIFICANT CHANGE UP
HCT VFR BLD CALC: 33 % — LOW (ref 39–50)
HCV AB S/CO SERPL IA: 0.13 S/CO — SIGNIFICANT CHANGE UP (ref 0–0.99)
HCV AB SERPL-IMP: SIGNIFICANT CHANGE UP
HGB BLD-MCNC: 10.2 G/DL — LOW (ref 13–17)
IMM GRANULOCYTES NFR BLD AUTO: 0.5 % — SIGNIFICANT CHANGE UP (ref 0–1.5)
IRON SATN MFR SERPL: 10 % — LOW (ref 16–55)
IRON SATN MFR SERPL: 27 UG/DL — LOW (ref 45–165)
LYMPHOCYTES # BLD AUTO: 1.85 K/UL — SIGNIFICANT CHANGE UP (ref 1–3.3)
LYMPHOCYTES # BLD AUTO: 12.3 % — LOW (ref 13–44)
M TB IFN-G BLD-IMP: NEGATIVE — SIGNIFICANT CHANGE UP
M TB IFN-G CD4+ BCKGRND COR BLD-ACNC: 0 IU/ML — SIGNIFICANT CHANGE UP
M TB IFN-G CD4+CD8+ BCKGRND COR BLD-ACNC: -0.01 IU/ML — SIGNIFICANT CHANGE UP
MAGNESIUM SERPL-MCNC: 2 MG/DL — SIGNIFICANT CHANGE UP (ref 1.6–2.6)
MCHC RBC-ENTMCNC: 26.8 PG — LOW (ref 27–34)
MCHC RBC-ENTMCNC: 30.9 GM/DL — LOW (ref 32–36)
MCV RBC AUTO: 86.6 FL — SIGNIFICANT CHANGE UP (ref 80–100)
MONOCYTES # BLD AUTO: 0.45 K/UL — SIGNIFICANT CHANGE UP (ref 0–0.9)
MONOCYTES NFR BLD AUTO: 3 % — SIGNIFICANT CHANGE UP (ref 2–14)
NEUTROPHILS # BLD AUTO: 12.55 K/UL — HIGH (ref 1.8–7.4)
NEUTROPHILS NFR BLD AUTO: 83.4 % — HIGH (ref 43–77)
NRBC # BLD: 0 /100 WBCS — SIGNIFICANT CHANGE UP (ref 0–0)
PHOSPHATE SERPL-MCNC: 4.4 MG/DL — SIGNIFICANT CHANGE UP (ref 2.5–4.5)
PLATELET # BLD AUTO: 387 K/UL — SIGNIFICANT CHANGE UP (ref 150–400)
POTASSIUM SERPL-MCNC: 4.3 MMOL/L — SIGNIFICANT CHANGE UP (ref 3.5–5.3)
POTASSIUM SERPL-SCNC: 4.3 MMOL/L — SIGNIFICANT CHANGE UP (ref 3.5–5.3)
PROT SERPL-MCNC: 7.5 G/DL — SIGNIFICANT CHANGE UP (ref 6–8.3)
QUANT TB PLUS MITOGEN MINUS NIL: 9.84 IU/ML — SIGNIFICANT CHANGE UP
RBC # BLD: 3.81 M/UL — LOW (ref 4.2–5.8)
RBC # BLD: 3.81 M/UL — LOW (ref 4.2–5.8)
RBC # FLD: 12.7 % — SIGNIFICANT CHANGE UP (ref 10.3–14.5)
RETICS #: 51.4 K/UL — SIGNIFICANT CHANGE UP (ref 25–125)
RETICS/RBC NFR: 1.4 % — SIGNIFICANT CHANGE UP (ref 0.5–2.5)
SARS-COV-2 IGG+IGM SERPL QL IA: >250 U/ML — HIGH
SARS-COV-2 IGG+IGM SERPL QL IA: POSITIVE
SODIUM SERPL-SCNC: 135 MMOL/L — SIGNIFICANT CHANGE UP (ref 135–145)
TIBC SERPL-MCNC: 270 UG/DL — SIGNIFICANT CHANGE UP (ref 220–430)
TRANSFERRIN SERPL-MCNC: 134 MG/DL — LOW (ref 200–360)
TROPONIN T, HIGH SENSITIVITY RESULT: 1303 NG/L — HIGH (ref 0–51)
TSH SERPL-MCNC: 3.37 UIU/ML — SIGNIFICANT CHANGE UP (ref 0.27–4.2)
UIBC SERPL-MCNC: 243 UG/DL — SIGNIFICANT CHANGE UP (ref 110–370)
VIT B12 SERPL-MCNC: >2000 PG/ML — HIGH (ref 232–1245)
WBC # BLD: 15.03 K/UL — HIGH (ref 3.8–10.5)
WBC # FLD AUTO: 15.03 K/UL — HIGH (ref 3.8–10.5)

## 2021-09-01 PROCEDURE — 99254 IP/OBS CNSLTJ NEW/EST MOD 60: CPT | Mod: GC

## 2021-09-01 PROCEDURE — 99222 1ST HOSP IP/OBS MODERATE 55: CPT

## 2021-09-01 PROCEDURE — 99233 SBSQ HOSP IP/OBS HIGH 50: CPT

## 2021-09-01 PROCEDURE — 73720 MRI LWR EXTREMITY W/O&W/DYE: CPT | Mod: 26,50

## 2021-09-01 PROCEDURE — 31575 DIAGNOSTIC LARYNGOSCOPY: CPT

## 2021-09-01 PROCEDURE — 99233 SBSQ HOSP IP/OBS HIGH 50: CPT | Mod: GC

## 2021-09-01 PROCEDURE — 99221 1ST HOSP IP/OBS SF/LOW 40: CPT | Mod: GC

## 2021-09-01 RX ORDER — INSULIN LISPRO 100/ML
VIAL (ML) SUBCUTANEOUS
Refills: 0 | Status: DISCONTINUED | OUTPATIENT
Start: 2021-09-01 | End: 2021-09-01

## 2021-09-01 RX ORDER — SODIUM CHLORIDE 9 MG/ML
1000 INJECTION, SOLUTION INTRAVENOUS
Refills: 0 | Status: DISCONTINUED | OUTPATIENT
Start: 2021-09-01 | End: 2021-09-01

## 2021-09-01 RX ORDER — SODIUM CHLORIDE 9 MG/ML
1000 INJECTION, SOLUTION INTRAVENOUS
Refills: 0 | Status: DISCONTINUED | OUTPATIENT
Start: 2021-09-01 | End: 2021-09-09

## 2021-09-01 RX ORDER — INSULIN LISPRO 100/ML
VIAL (ML) SUBCUTANEOUS AT BEDTIME
Refills: 0 | Status: DISCONTINUED | OUTPATIENT
Start: 2021-09-01 | End: 2021-09-05

## 2021-09-01 RX ORDER — INSULIN LISPRO 100/ML
VIAL (ML) SUBCUTANEOUS AT BEDTIME
Refills: 0 | Status: DISCONTINUED | OUTPATIENT
Start: 2021-09-01 | End: 2021-09-01

## 2021-09-01 RX ORDER — INSULIN LISPRO 100/ML
VIAL (ML) SUBCUTANEOUS
Refills: 0 | Status: DISCONTINUED | OUTPATIENT
Start: 2021-09-01 | End: 2021-09-05

## 2021-09-01 RX ORDER — PANTOPRAZOLE SODIUM 20 MG/1
40 TABLET, DELAYED RELEASE ORAL
Refills: 0 | Status: DISCONTINUED | OUTPATIENT
Start: 2021-09-01 | End: 2021-09-09

## 2021-09-01 RX ORDER — LIDOCAINE 4 G/100G
1 CREAM TOPICAL ONCE
Refills: 0 | Status: COMPLETED | OUTPATIENT
Start: 2021-09-01 | End: 2021-09-01

## 2021-09-01 RX ADMIN — LIDOCAINE 1 PATCH: 4 CREAM TOPICAL at 18:50

## 2021-09-01 RX ADMIN — LIDOCAINE 1 PATCH: 4 CREAM TOPICAL at 19:00

## 2021-09-01 RX ADMIN — SODIUM CHLORIDE 75 MILLILITER(S): 9 INJECTION, SOLUTION INTRAVENOUS at 18:50

## 2021-09-01 RX ADMIN — ENOXAPARIN SODIUM 40 MILLIGRAM(S): 100 INJECTION SUBCUTANEOUS at 12:30

## 2021-09-01 RX ADMIN — Medication 1 TABLET(S): at 12:30

## 2021-09-01 RX ADMIN — Medication 1: at 12:30

## 2021-09-01 RX ADMIN — Medication 81 MILLIGRAM(S): at 12:29

## 2021-09-01 RX ADMIN — Medication 100 MILLIGRAM(S): at 04:25

## 2021-09-01 RX ADMIN — Medication 100 MILLIGRAM(S): at 22:00

## 2021-09-01 RX ADMIN — INSULIN GLARGINE 5 UNIT(S): 100 INJECTION, SOLUTION SUBCUTANEOUS at 22:00

## 2021-09-01 NOTE — SWALLOW BEDSIDE ASSESSMENT ADULT - SWALLOW EVAL: DIAGNOSIS
Pt seen for clinical bedside swallow evaluation s/p admission for subacute progressively worsening proximal muscle weakness, dysphagia and significant weight loss. Myositis suspected although other etiologies are being ruled out. Today, Pt presented with 1) suspected pharyngeal dysphagia characterized by baseline globus sensation, inconsistent c/o pharyngeal stasis and throat clearing noted with all consistencies. Vocal quality remained clear t/o evaluation. Given evidence of pharyngeal dysphagia at bedside, pt would benefit from modified barium swallow study. Pt seen for clinical bedside swallow evaluation s/p admission for subacute progressively worsening proximal muscle weakness, dysphagia and significant weight loss. Myositis suspected although other etiologies are being ruled out. Today, Pt presented with 1) suspected pharyngeal dysphagia characterized by baseline globus sensation, inconsistent c/o pharyngeal stasis, multiple swallows, and throat clearing noted with all consistencies. Pt states that liquid wash is effective in reducing pharyngeal stasis. Vocal quality remained clear t/o evaluation. Given evidence of pharyngeal dysphagia at bedside, pt would benefit from modified barium swallow study.

## 2021-09-01 NOTE — SWALLOW BEDSIDE ASSESSMENT ADULT - SWALLOW EVAL: PATIENT/FAMILY GOALS STATEMENT
Per Pt, symptoms of dysphagia began in April. Pt endorses baseline globus sensation comparable to having a sore throat and needing to swallow multiple times. Pt states that his feeling is intermittent and present during bedside evaluation this date. When present, results in globus sensation and throat clearing/coughing with PO. Pt denied regurgitation of food/liquids although did endorse that he has coughed up pills into his oral cavity. Pt states that he was evaluated by SLP at [facility] who kept him on a regular diet and provided dysphagia exercises but no objective testing was performed. He denied history of pneumonia.

## 2021-09-01 NOTE — PROGRESS NOTE ADULT - PROBLEM SELECTOR PLAN 5
Reports taking 10-12 U of daily insulin as well as janumet. reports that A1c had been poorly controlled in the past. Most recent on outpatient paperwork was 8.1. it is possible that patient's dysphagia is related to gastroparesis although less likely.  - A1C 7.2  -lantus 5 qhs  -ISS

## 2021-09-01 NOTE — SWALLOW BEDSIDE ASSESSMENT ADULT - ASR SWALLOW ASPIRATION MONITOR
If noted:  D/C p.o. intake, provide non-oral nutrition/hydration/meds and consult this service/change of breathing pattern/cough/gurgly voice/fever/pneumonia/throat clearing/upper respiratory infection

## 2021-09-01 NOTE — SWALLOW BEDSIDE ASSESSMENT ADULT - H & P REVIEW
yes
PMHx: DM, w/ hx of prior recent admissions to OSH with TIJERINA, Troponin I 0.4, Sinus Tach, CPK 4000, LDH, aldolase elevated, who presents to the [facility] with subacute weakness, weight loss, dysphagia. Admitted for troponemia. Pt reports gradual onset of dysphagia starting in 4/2021 that was to both solids and liquids (reports getting pfizer series in 3/2021). States he could only tolerate small sips of water or bites of food and that he has intermittent globus sensation that when present makes it impossible to eat or drink anything resulting in choking/coughing/regurgitation. Around the same time, he reports progressive SOB and fatigue and a ~40lb weight loss.

## 2021-09-01 NOTE — PROGRESS NOTE ADULT - SUBJECTIVE AND OBJECTIVE BOX
KARLA LORENZ  [id number]    INTERVAL HPI/OVERNIGHT EVENTS:        PMHx/PSHx/FamHx/SocHx reviewed and no significant changes    REVIEW OF SYSTEMS   - reviewed with patient and  negative other than as above or previously documented.     MEDICATIONS  (STANDING):  aspirin enteric coated 81 milliGRAM(s) Oral daily  dextrose 40% Gel 15 Gram(s) Oral once  dextrose 5%. 1000 milliLiter(s) (50 mL/Hr) IV Continuous <Continuous>  dextrose 5%. 1000 milliLiter(s) (100 mL/Hr) IV Continuous <Continuous>  dextrose 50% Injectable 25 Gram(s) IV Push once  dextrose 50% Injectable 12.5 Gram(s) IV Push once  dextrose 50% Injectable 25 Gram(s) IV Push once  enoxaparin Injectable 40 milliGRAM(s) SubCutaneous daily  glucagon  Injectable 1 milliGRAM(s) IntraMuscular once  insulin glargine Injectable (LANTUS) 5 Unit(s) SubCutaneous at bedtime  insulin lispro (ADMELOG) corrective regimen sliding scale   SubCutaneous three times a day before meals  insulin lispro (ADMELOG) corrective regimen sliding scale   SubCutaneous at bedtime  lidocaine   4% Patch 1 Patch Transdermal once  multivitamin 1 Tablet(s) Oral daily  thiamine 100 milliGRAM(s) Oral daily    MEDICATIONS  (PRN):      Allergies    No Known Allergies    Intolerances          Vital Signs Last 24 Hrs  T(C): 36.9 (01 Sep 2021 11:20), Max: 37.1 (31 Aug 2021 20:00)  T(F): 98.5 (01 Sep 2021 11:20), Max: 98.7 (31 Aug 2021 20:00)  HR: 97 (01 Sep 2021 11:20) (97 - 115)  BP: 105/67 (01 Sep 2021 11:20) (100/58 - 115/62)  BP(mean): --  RR: 18 (01 Sep 2021 11:20) (16 - 18)  SpO2: 95% (01 Sep 2021 11:20) (95% - 100%)    Physical Exam:  General: NAD  HEENT: EOMI, MMM  Cardio: +S1/S2, RRR  Resp: CTA b/l  GI: +BS, soft, NT/ND  MSK:  Neuro: AAOx3  Psych: wnl    LABS:                        10.2   15.03 )-----------( 387      ( 01 Sep 2021 06:19 )             33.0         135  |  96  |  13  ----------------------------<  138<H>  4.3   |  22  |  0.53    Ca    9.7      01 Sep 2021 07:57  Phos  4.4       Mg     2.0         TPro  7.5  /  Alb  2.8<L>  /  TBili  0.4  /  DBili  x   /  AST  125<H>  /  ALT  174<H>  /  AlkPhos  89      PT/INR - ( 31 Aug 2021 07:13 )   PT: 14.2 sec;   INR: 1.19 ratio         PTT - ( 31 Aug 2021 07:13 )  PTT:27.4 sec  Urinalysis Basic - ( 30 Aug 2021 22:02 )    Color: Light Yellow / Appearance: Clear / S.009 / pH: x  Gluc: x / Ketone: Negative  / Bili: Negative / Urobili: Negative   Blood: x / Protein: Trace / Nitrite: Negative   Leuk Esterase: Negative / RBC: 1 /hpf / WBC 0 /HPF   Sq Epi: x / Non Sq Epi: 0 /hpf / Bacteria: Negative          RADIOLOGY & ADDITIONAL TESTS:       KARLA LORENZ  [id number]    INTERVAL HPI/ OVERNIGHT EVENTS:  still c/o swallowing difficulty and muscle weakness. Denies fever, chills, SOB. MRI b/l thigh today      PMHx/PSHx/FamHx/SocHx reviewed and no significant changes    REVIEW OF SYSTEMS   - reviewed with patient and  negative other than as above or previously documented.     MEDICATIONS  (STANDING):  aspirin enteric coated 81 milliGRAM(s) Oral daily  dextrose 40% Gel 15 Gram(s) Oral once  dextrose 5%. 1000 milliLiter(s) (50 mL/Hr) IV Continuous <Continuous>  dextrose 5%. 1000 milliLiter(s) (100 mL/Hr) IV Continuous <Continuous>  dextrose 50% Injectable 25 Gram(s) IV Push once  dextrose 50% Injectable 12.5 Gram(s) IV Push once  dextrose 50% Injectable 25 Gram(s) IV Push once  enoxaparin Injectable 40 milliGRAM(s) SubCutaneous daily  glucagon  Injectable 1 milliGRAM(s) IntraMuscular once  insulin glargine Injectable (LANTUS) 5 Unit(s) SubCutaneous at bedtime  insulin lispro (ADMELOG) corrective regimen sliding scale   SubCutaneous three times a day before meals  insulin lispro (ADMELOG) corrective regimen sliding scale   SubCutaneous at bedtime  lidocaine   4% Patch 1 Patch Transdermal once  multivitamin 1 Tablet(s) Oral daily  thiamine 100 milliGRAM(s) Oral daily    MEDICATIONS  (PRN)    Allergies  No Known Allergies  Intolerances    Vital Signs Last 24 Hrs  T(C): 36.9 (01 Sep 2021 11:20), Max: 37.1 (31 Aug 2021 20:00)  T(F): 98.5 (01 Sep 2021 11:20), Max: 98.7 (31 Aug 2021 20:00)  HR: 97 (01 Sep 2021 11:20) (97 - 115)  BP: 105/67 (01 Sep 2021 11:20) (100/58 - 115/62)  BP(mean): --  RR: 18 (01 Sep 2021 11:20) (16 - 18)  SpO2: 95% (01 Sep 2021 11:20) (95% - 100%)    Physical Exam:  General: NAD, thin   HEENT: EOMI, MMM  Cardio: +S1/S2, RRR  Resp: CTA b/l  GI: +BS, soft, NT/ND  MSK:   muscle strength:    shoulder: unable to perform   bicep/triceps: 2/5 b/l   finger/: 5/5 b/l   hip flexion: 2/5   Neuro: AAOx3  Psych: wnl    LABS:                        10.2   15.03 )-----------( 387      ( 01 Sep 2021 06:19 )             33.0     09-    135  |  96  |  13  ----------------------------<  138<H>  4.3   |  22  |  0.53    Ca    9.7      01 Sep 2021 07:57  Phos  4.4     -  Mg     2.0     -    TPro  7.5  /  Alb  2.8<L>  /  TBili  0.4  /  DBili  x   /  AST  125<H>  /  ALT  174<H>  /  AlkPhos  89      PT/INR - ( 31 Aug 2021 07:13 )   PT: 14.2 sec;   INR: 1.19 ratio    PTT - ( 31 Aug 2021 07:13 )  PTT:27.4 sec  Urinalysis Basic - ( 30 Aug 2021 22:02 )    Color: Light Yellow / Appearance: Clear / S.009 / pH: x  Gluc: x / Ketone: Negative  / Bili: Negative / Urobili: Negative   Blood: x / Protein: Trace / Nitrite: Negative   Leuk Esterase: Negative / RBC: 1 /hpf / WBC 0 /HPF   Sq Epi: x / Non Sq Epi: 0 /hpf / Bacteria: Negative    RADIOLOGY & ADDITIONAL TESTS:  < from: CT Abdomen and Pelvis w/ IV Cont (21 @ 10:03) >  FINDINGS:  CHEST:  LUNGS AND LARGE AIRWAYS: Patent central airways. No parenchymal consolidation. Right lower lobe calcified granuloma.  PLEURA: No pleural effusion.  VESSELS: Atherosclerotic changes of the aorta and coronary arteries.  HEART: Heart size is normal. No pericardial effusion.  MEDIASTINUM AND ANYA: Small calcified lymph nodes in the subcarinal and right hilar region, may be the sequela of prior granulomatous disease.  CHEST WALL AND LOWER NECK: Within normal limits.    ABDOMEN AND PELVIS:  LIVER: Within normal limits.  BILE DUCTS: Normal caliber.  GALLBLADDER: Within normal limits.  SPLEEN: Within normal limits.  PANCREAS: Within normal limits.  ADRENALS: Within normal limits.  KIDNEYS/URETERS: Bilateral renal cysts and subcentimeter hypodensities too small to characterize. No hydronephrosis.    BLADDER: Within normal limits.  REPRODUCTIVE ORGANS: Prostate is mildly enlarged.    BOWEL: No bowel obstruction. Appendix is normal.  PERITONEUM: No ascites.  VESSELS: Atherosclerotic changes.  RETROPERITONEUM/LYMPH NODES: No lymphadenopathy.  ABDOMINAL WALL: Small fat-containing umbilical hernia.  BONES: Within normal limits.    IMPRESSION:  No CT evidence of intrathoracicor intra-abdominal malignancy.  < end of copied text > KARLA LORENZ  [id number]    INTERVAL HPI/ OVERNIGHT EVENTS:  still c/o swallowing difficulty and muscle weakness. Denies fever, chills, SOB.    PMHx/PSHx/FamHx/SocHx reviewed and no significant changes    REVIEW OF SYSTEMS   - reviewed with patient and  negative other than as above or previously documented.     MEDICATIONS  (STANDING):  aspirin enteric coated 81 milliGRAM(s) Oral daily  dextrose 40% Gel 15 Gram(s) Oral once  dextrose 5%. 1000 milliLiter(s) (50 mL/Hr) IV Continuous <Continuous>  dextrose 5%. 1000 milliLiter(s) (100 mL/Hr) IV Continuous <Continuous>  dextrose 50% Injectable 25 Gram(s) IV Push once  dextrose 50% Injectable 12.5 Gram(s) IV Push once  dextrose 50% Injectable 25 Gram(s) IV Push once  enoxaparin Injectable 40 milliGRAM(s) SubCutaneous daily  glucagon  Injectable 1 milliGRAM(s) IntraMuscular once  insulin glargine Injectable (LANTUS) 5 Unit(s) SubCutaneous at bedtime  insulin lispro (ADMELOG) corrective regimen sliding scale   SubCutaneous three times a day before meals  insulin lispro (ADMELOG) corrective regimen sliding scale   SubCutaneous at bedtime  lidocaine   4% Patch 1 Patch Transdermal once  multivitamin 1 Tablet(s) Oral daily  thiamine 100 milliGRAM(s) Oral daily    MEDICATIONS  (PRN)    Allergies  No Known Allergies  Intolerances    Vital Signs Last 24 Hrs  T(C): 36.9 (01 Sep 2021 11:20), Max: 37.1 (31 Aug 2021 20:00)  T(F): 98.5 (01 Sep 2021 11:20), Max: 98.7 (31 Aug 2021 20:00)  HR: 97 (01 Sep 2021 11:20) (97 - 115)  BP: 105/67 (01 Sep 2021 11:20) (100/58 - 115/62)  BP(mean): --  RR: 18 (01 Sep 2021 11:20) (16 - 18)  SpO2: 95% (01 Sep 2021 11:20) (95% - 100%)    Physical Exam:  General: NAD, thin   HEENT: EOMI, MMM  Cardio: +S1/S2, RRR  Resp: CTA b/l  GI: +BS, soft, NT/ND  MSK:   muscle strength:    shoulder: unable to perform   bicep/triceps: 2/5 b/l   finger/: 5/5 b/l   hip flexion: 2/5   Neuro: AAOx3  Psych: wnl    LABS:                        10.2   15.03 )-----------( 387      ( 01 Sep 2021 06:19 )             33.0     -    135  |  96  |  13  ----------------------------<  138<H>  4.3   |  22  |  0.53    Ca    9.7      01 Sep 2021 07:57  Phos  4.4       Mg     2.0         TPro  7.5  /  Alb  2.8<L>  /  TBili  0.4  /  DBili  x   /  AST  125<H>  /  ALT  174<H>  /  AlkPhos  89      PT/INR - ( 31 Aug 2021 07:13 )   PT: 14.2 sec;   INR: 1.19 ratio    PTT - ( 31 Aug 2021 07:13 )  PTT:27.4 sec  Urinalysis Basic - ( 30 Aug 2021 22:02 )    Color: Light Yellow / Appearance: Clear / S.009 / pH: x  Gluc: x / Ketone: Negative  / Bili: Negative / Urobili: Negative   Blood: x / Protein: Trace / Nitrite: Negative   Leuk Esterase: Negative / RBC: 1 /hpf / WBC 0 /HPF   Sq Epi: x / Non Sq Epi: 0 /hpf / Bacteria: Negative    RADIOLOGY & ADDITIONAL TESTS:  < from: CT Abdomen and Pelvis w/ IV Cont (21 @ 10:03) >  FINDINGS:  CHEST:  LUNGS AND LARGE AIRWAYS: Patent central airways. No parenchymal consolidation. Right lower lobe calcified granuloma.  PLEURA: No pleural effusion.  VESSELS: Atherosclerotic changes of the aorta and coronary arteries.  HEART: Heart size is normal. No pericardial effusion.  MEDIASTINUM AND ANYA: Small calcified lymph nodes in the subcarinal and right hilar region, may be the sequela of prior granulomatous disease.  CHEST WALL AND LOWER NECK: Within normal limits.    ABDOMEN AND PELVIS:  LIVER: Within normal limits.  BILE DUCTS: Normal caliber.  GALLBLADDER: Within normal limits.  SPLEEN: Within normal limits.  PANCREAS: Within normal limits.  ADRENALS: Within normal limits.  KIDNEYS/URETERS: Bilateral renal cysts and subcentimeter hypodensities too small to characterize. No hydronephrosis.    BLADDER: Within normal limits.  REPRODUCTIVE ORGANS: Prostate is mildly enlarged.    BOWEL: No bowel obstruction. Appendix is normal.  PERITONEUM: No ascites.  VESSELS: Atherosclerotic changes.  RETROPERITONEUM/LYMPH NODES: No lymphadenopathy.  ABDOMINAL WALL: Small fat-containing umbilical hernia.  BONES: Within normal limits.    IMPRESSION:  No CT evidence of intrathoracicor intra-abdominal malignancy.  < end of copied text > KARLA LORENZ  [id number]    INTERVAL HPI/ OVERNIGHT EVENTS:  still c/o swallowing difficulty and muscle weakness. Denies fever, chills, SOB.    PMHx/PSHx/FamHx/SocHx reviewed and no significant changes    REVIEW OF SYSTEMS   - reviewed with patient and  negative other than as above or previously documented.     MEDICATIONS  (STANDING):  aspirin enteric coated 81 milliGRAM(s) Oral daily  dextrose 40% Gel 15 Gram(s) Oral once  dextrose 5%. 1000 milliLiter(s) (50 mL/Hr) IV Continuous <Continuous>  dextrose 5%. 1000 milliLiter(s) (100 mL/Hr) IV Continuous <Continuous>  dextrose 50% Injectable 25 Gram(s) IV Push once  dextrose 50% Injectable 12.5 Gram(s) IV Push once  dextrose 50% Injectable 25 Gram(s) IV Push once  enoxaparin Injectable 40 milliGRAM(s) SubCutaneous daily  glucagon  Injectable 1 milliGRAM(s) IntraMuscular once  insulin glargine Injectable (LANTUS) 5 Unit(s) SubCutaneous at bedtime  insulin lispro (ADMELOG) corrective regimen sliding scale   SubCutaneous three times a day before meals  insulin lispro (ADMELOG) corrective regimen sliding scale   SubCutaneous at bedtime  lidocaine   4% Patch 1 Patch Transdermal once  multivitamin 1 Tablet(s) Oral daily  thiamine 100 milliGRAM(s) Oral daily    MEDICATIONS  (PRN)    Allergies  No Known Allergies  Intolerances    Vital Signs Last 24 Hrs  T(C): 36.9 (01 Sep 2021 11:20), Max: 37.1 (31 Aug 2021 20:00)  T(F): 98.5 (01 Sep 2021 11:20), Max: 98.7 (31 Aug 2021 20:00)  HR: 97 (01 Sep 2021 11:20) (97 - 115)  BP: 105/67 (01 Sep 2021 11:20) (100/58 - 115/62)  BP(mean): --  RR: 18 (01 Sep 2021 11:20) (16 - 18)  SpO2: 95% (01 Sep 2021 11:20) (95% - 100%)    Physical Exam:  General: NAD, thin   HEENT: EOMI, MMM  Cardio: +S1/S2, RRR  Resp: CTA b/l  GI: +BS, soft, NT/ND  MSK:   muscle strength:    shoulder: unable to perform   bicep/triceps: 2/5 b/l   finger/: 5/5 b/l   hip flexion: 2/5   Neuro: AAOx3  Psych: wnl    LABS:                        10.2   15.03 )-----------( 387      ( 01 Sep 2021 06:19 )             33.0     -    135  |  96  |  13  ----------------------------<  138<H>  4.3   |  22  |  0.53    Ca    9.7      01 Sep 2021 07:57  Phos  4.4       Mg     2.0         TPro  7.5  /  Alb  2.8<L>  /  TBili  0.4  /  DBili  x   /  AST  125<H>  /  ALT  174<H>  /  AlkPhos  89      PT/INR - ( 31 Aug 2021 07:13 )   PT: 14.2 sec;   INR: 1.19 ratio    PTT - ( 31 Aug 2021 07:13 )  PTT:27.4 sec  Urinalysis Basic - ( 30 Aug 2021 22:02 )    Color: Light Yellow / Appearance: Clear / S.009 / pH: x  Gluc: x / Ketone: Negative  / Bili: Negative / Urobili: Negative   Blood: x / Protein: Trace / Nitrite: Negative   Leuk Esterase: Negative / RBC: 1 /hpf / WBC 0 /HPF   Sq Epi: x / Non Sq Epi: 0 /hpf / Bacteria: Negative    RADIOLOGY & ADDITIONAL TESTS:  < from: CT Abdomen and Pelvis w/ IV Cont (21 @ 10:03) >  FINDINGS:  CHEST:  LUNGS AND LARGE AIRWAYS: Patent central airways. No parenchymal consolidation. Right lower lobe calcified granuloma.  PLEURA: No pleural effusion.  VESSELS: Atherosclerotic changes of the aorta and coronary arteries.  HEART: Heart size is normal. No pericardial effusion.  MEDIASTINUM AND ANYA: Small calcified lymph nodes in the subcarinal and right hilar region, may be the sequela of prior granulomatous disease.  CHEST WALL AND LOWER NECK: Within normal limits.    ABDOMEN AND PELVIS:  LIVER: Within normal limits.  BILE DUCTS: Normal caliber.  GALLBLADDER: Within normal limits.  SPLEEN: Within normal limits.  PANCREAS: Within normal limits.  ADRENALS: Within normal limits.  KIDNEYS/URETERS: Bilateral renal cysts and subcentimeter hypodensities too small to characterize. No hydronephrosis.    BLADDER: Within normal limits.  REPRODUCTIVE ORGANS: Prostate is mildly enlarged.    BOWEL: No bowel obstruction. Appendix is normal.  PERITONEUM: No ascites.  VESSELS: Atherosclerotic changes.  RETROPERITONEUM/LYMPH NODES: No lymphadenopathy.  ABDOMINAL WALL: Small fat-containing umbilical hernia.  BONES: Within normal limits.    IMPRESSION:  No CT evidence of intrathoracicor intra-abdominal malignancy.  < end of copied text >    M< from: MR Femur w/wo IV Cont, Bilateral (21 @ 16:22) >  BONE: No fracture or osteonecrosis. Bilateral hip cartilage space narrowing in marginal osteophyte formation. No hip joint effusion.    SOFT TISSUE: Diffuse edema involving the musculature of the bilateral thighs. There is symmetric edema and enhancement of the bilateral gluteal muscles. Intense edema and enhancement of the musculature of the bilateral adductor muscles with relative sparing of the right adductor longus and bilateral gracilis muscles. Asymmetric edema and enhancement of the left rectus femoris muscle and proximal portion of the left sartorius. T1 imaging shows no focal fatty infiltration.    IMPRESSION:  1.  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  2.  Possible candidates for muscle biopsy include the right gluteal muscles or left proximal sartorius/rectus femoris.    < end of copied text >

## 2021-09-01 NOTE — CONSULT NOTE ADULT - PROBLEM SELECTOR RECOMMENDATION 9
-Pending MBS  -Diet recs per speech and swallow  -Continue further workup of underlying cause of dysphagia and diffuse muscle weakness per medicine  -Reconsult ENT as needed -Pending MBS  -Diet recs per speech and swallow  -Continue further workup of underlying cause of dysphagia and diffuse muscle weakness per medicine  - reflux precautions  consider course of reflux Tx   - can follow up on D/c

## 2021-09-01 NOTE — PROGRESS NOTE ADULT - PROBLEM SELECTOR PLAN 1
troponin elevated on admission with subsequent downtrend 1022->991. EKg with sinus tachycardia and without evidence of ischemia. patient denies exertional (or any) chest pain dyspnea, orthopnea. BNP wnl. recent echo with LVEF 70%, normal RVSF, and no valvular abnormalities per discharge paperwork. Very low suspicion for ACS.  -suspect elevation in the setting of myositis vs myocarditis  - troponin uptrending, trend  - ECHO with hyperdynamic EF=75-80%, mild diastolic dysfunction grade I  - LHC w/ nonobstructive CAD  - c/w low dose aspirin  - continue to hold statins in the setting of myopathy and elevated AST/ALT  - c/w telemetry monitoring  - cardiac MRI to investigate myocarditis (COVID vaccine one month ago)

## 2021-09-01 NOTE — SWALLOW BEDSIDE ASSESSMENT ADULT - SPECIFY REASON(S)
To subjectively assess oropharyngeal swallow function, r/o dysphagia
To subjectively assess oropharyngeal swallow function, r/o dysphagia

## 2021-09-01 NOTE — PROGRESS NOTE ADULT - PROBLEM SELECTOR PLAN 6
Had colonic thickening on CT at OSH. planned for colonoscopy but held off due to elevated troponins and request for LHC prior to C-scope. per report, the thickening could have been due to underdistension.  -given current differential patient would likely benefit from malignancy workup anyway  - No evidence of malignancy on CT chest/abdo/pelvis   - GI following for dysphagia and colonic thickening, appreciate recs

## 2021-09-01 NOTE — PROGRESS NOTE ADULT - PROBLEM SELECTOR PLAN 4
To solids and liquids and without significant symptoms of dyspepsia but with occasional regurgitation. No issue with initiating swallow but does have cough. favor esophageal > oropharyngeal dysphagia. s/p normal outpatient EGD.  Failed bedside swallow eval.  - likely neuromuscular in etiology  - regular diet per Speech&Swallow, with further characterization of dysphagia with MDS tomorrow  - ENT eval suggested laryngopharyngeal reflux  - IVF overnight for general hydration

## 2021-09-01 NOTE — SWALLOW BEDSIDE ASSESSMENT ADULT - SLP PERTINENT HISTORY OF CURRENT PROBLEM
DM, w/ hx of prior recent admissions to OSH with TIJERINA, Troponin I 0.4, Sinus Tach, CPK 4000, LDH, aldolase elevated, who presents to the [facility] with subacute weakness, weight loss, dysphagia. Admitted for troponemia. Pt reports gradual onset of dysphagia starting in 4/2021 that was to both solids and liquids (reports getting pfizer series in 3/2021). States he could only tolerate small sips of water or bites of food and that he has intermittent globus sensation that when present makes it impossible to eat or drink anything resulting in choking/coughing/regurgitation. Around the same time, he reports progressive SOB and fatigue and a ~40lb weight loss. Pt w/ gradual onset of symmetric proximal muscle weakness associated with enzyme elevations indicative of muscle injury due to inflammation concerning for myositis/ inflammatory myopathy.
Pt w/ gradual onset of symmetric proximal muscle weakness associated with enzyme elevations indicative of muscle injury due to inflammation concerning for myositis/ inflammatory myopathy. Unclear underlying etiology. Denies rash, joint pain, fever/chills. W/o dermatologic manifestations. DDX includes Primary polymyositis, immune-mediated necrotizing myopathy, anti-synthetase, and inclusion body myositis. W/o rash dermatomyositis appears less likely. Concern also for underlying paraneoplastic syndrome or other infiltrative disease such as amyloidosis. Less likely to be iatrogenic due to medication as no offending medication (statin or steroids most likely) noted. No clear underlying causea, outpt TSH wnl. Otherwise Ca wnl, less likely parathyroid. Can consider cortisol levels if other labs unrevealing. F/up Vit D. Pending Rheum and GI consult. Swallow eval and barium swallow, EGD at outside hospital normal; consider ENT eval.

## 2021-09-01 NOTE — PROGRESS NOTE ADULT - PROBLEM SELECTOR PLAN 3
Normal MCV. Suspect AoCD.  - Hgb stable 10.2 from 9.7  - LDH elevated, haptoglobin and bili wnl  - b12/folate wnl  - iron 8% sat, serum iron 13, TIBC decreased, ferritin elevated 704 from 538  - continue to monitor

## 2021-09-01 NOTE — SWALLOW BEDSIDE ASSESSMENT ADULT - COMMENTS
Per H&P- No issue with initiating swallow but does have cough. Favor esophageal > oropharyngeal dysphagia. s/p normal outpatient EGD. Failed dysphagia screen 8/31. Suspect neuromuscular disease but will need barium swallow first.     8/30 Vascular Cardiology- Pt with Colon thickening and RLL granuloma on CT Chest/Abd/Pelvis. Patient was referred for LHC prior to colonoscopy. EKG not in chart, reportedly without ST changes per HPI. Echo pending. Plan for cardiac cath today.    8/31 Esophagram- Preliminary  radiograph of the partially visualized chest is unremarkable. The patient swallowed barium without difficulty. The esophagus demonstrates normal distensibility, contour and course. There is no abnormal extrinsic mass effect. Contrast passes freely into the stomach. No gastroesophageal reflux was demonstrated during this examination.    8/31: SLP bedside swallow evaluation deferred due to Pt NPO for LHC. Cardiac Cath showed non-obstructive coronary artery disease.   Rheum- Primary diagnosis favors a myositis with pharyngeal weakness to explain the dysphagia. Differential of myositis includes polymyositis, dermatomyositis (lack of skin findings does not support this), inclusion body myositis (usually a distal myositis), statin induced myopathy (unlikely as he hasn't been on a statin in a few years) vs. necrotizing autoimmune myopathy. Also to be considered are infection and malignancy.     **Not previously known to this service. Pt reports liquid wash is effective in reducing pharyngeal stasis

## 2021-09-01 NOTE — CONSULT NOTE ADULT - SUBJECTIVE AND OBJECTIVE BOX
CC: globus sensation     HPI: 61 year old man with PMH DM2 presents with 5 month history of weight loss, proximal muscle weakness, likely neuromuscular dysphagia found to have very elevated troponin levels and normal EKG. Recent Echo and EGD mostly within normal limits. Outpatient labs with elevations in CK, troponin, aldolase, and CRP but normal TSH, SAMIA, antibodies for smith, RNP, centromere, scleroderma, and dsDNA. Per chart, overall picture favors inflammatory myopathy; without obvious rash favor polymyositis vs inclusion body myositis vs necrotizing autoimmune myositis. Also on the differential are malignancy, HIV, amyloidosis, TB, or chronic infection.   ENT consulted to evaluate pts upper airway as he complains of a lump in his throat after swallowing certain things, he reports specifically a small pill was hard to get down, eventually went down with water. Pt says he feels a lump around his farzaneh's apple when this happens. Pt seen by speech and swallow, subjective eval so far favors esophageal > oropharyngeal dysphagia. Pt currently on regular diet, pending MBS. Pt denies voice changes, throat pain or difficulty breathing    PAST MEDICAL & SURGICAL HISTORY:  Type 2 diabetes mellitus    History of esophagogastroduodenoscopy (EGD)      Allergies    No Known Allergies    Intolerances      MEDICATIONS  (STANDING):  aspirin enteric coated 81 milliGRAM(s) Oral daily  dextrose 40% Gel 15 Gram(s) Oral once  dextrose 5%. 1000 milliLiter(s) (100 mL/Hr) IV Continuous <Continuous>  dextrose 5%. 1000 milliLiter(s) (50 mL/Hr) IV Continuous <Continuous>  dextrose 50% Injectable 25 Gram(s) IV Push once  dextrose 50% Injectable 12.5 Gram(s) IV Push once  dextrose 50% Injectable 25 Gram(s) IV Push once  enoxaparin Injectable 40 milliGRAM(s) SubCutaneous daily  glucagon  Injectable 1 milliGRAM(s) IntraMuscular once  insulin glargine Injectable (LANTUS) 5 Unit(s) SubCutaneous at bedtime  insulin lispro (ADMELOG) corrective regimen sliding scale   SubCutaneous three times a day before meals  insulin lispro (ADMELOG) corrective regimen sliding scale   SubCutaneous at bedtime  lidocaine   4% Patch 1 Patch Transdermal once  multivitamin 1 Tablet(s) Oral daily  thiamine 100 milliGRAM(s) Oral daily    MEDICATIONS  (PRN):      Social History: Never smoker, never drinker, no drugs.  Monogamous with wife, no STIs  Lives with wife and son's family. daughter in law helps take care of him.  Worked in [employer] before snf.    Family history:   FH: leukemia (Father)        ROS:   ENT: all negative except as noted in HPI   Skin: No itching, dryness, rash, changes to hair, or skin masses  CV: denies palpitations  Pulm: denies SOB, cough, hemoptysis  GI: denies change in appetite, indigestion, n/v  : denies pertinent urinary symptoms, urgency  Neuro: denies numbness/tingling, loss of sensation  Psych: denies anxiety  MS: denies muscle weakness, instability  Heme: denies easy bruising or bleeding  Endo: denies heat/cold intolerance, excessive sweating  Vascular: denies LE edema    Vital Signs Last 24 Hrs  T(C): 36.9 (01 Sep 2021 11:20), Max: 37.1 (31 Aug 2021 20:00)  T(F): 98.5 (01 Sep 2021 11:20), Max: 98.7 (31 Aug 2021 20:00)  HR: 97 (01 Sep 2021 11:20) (97 - 115)  BP: 105/67 (01 Sep 2021 11:20) (100/58 - 115/62)  BP(mean): --  RR: 18 (01 Sep 2021 11:20) (16 - 18)  SpO2: 95% (01 Sep 2021 11:20) (95% - 100%)                          10.2   15.03 )-----------( 387      ( 01 Sep 2021 06:19 )             33.0    09-01    135  |  96  |  13  ----------------------------<  138<H>  4.3   |  22  |  0.53    Ca    9.7      01 Sep 2021 07:57  Phos  4.4     09-01  Mg     2.0     09-01    TPro  7.5  /  Alb  2.8<L>  /  TBili  0.4  /  DBili  x   /  AST  125<H>  /  ALT  174<H>  /  AlkPhos  89  09-01   PT/INR - ( 31 Aug 2021 07:13 )   PT: 14.2 sec;   INR: 1.19 ratio         PTT - ( 31 Aug 2021 07:13 )  PTT:27.4 sec    PHYSICAL EXAM:  Gen: NAD  Skin: No rashes, bruises, or lesions  Head: Normocephalic, Atraumatic  Face: no edema, erythema, or fluctuance. Parotid glands soft without mass  Eyes: no scleral injection  Nose: Nares bilaterally patent, no discharge  Mouth: No Stridor / Drooling / Trismus.  Mucosa moist, tongue/uvula midline, oropharynx clear  Neck: Flat, supple, no lymphadenopathy, trachea midline, no masses  Lymphatic: No lymphadenopathy  Resp: breathing easily, no stridor  CV: no peripheral edema/cyanosis  GI: nondistended   Peripheral vascular: no JVD or edema  Neuro: facial nerve intact, no facial droop      Fiberoptic Indirect laryngoscopy:  (Scope #2 used)  Reason for Laryngoscopy: Globus sensation, dysphagia    Patient was unable to cooperate with mirror.  Nasopharynx, oropharynx, and hypopharynx clear, no bleeding. Tongue base, posterior pharyngeal wall, vallecula, epiglottis, and subglottis appear normal. No erythema, edema, pooling of secretions, masses or lesions. Airway patent, no foreign body visualized. No glottic/supraglottic edema. True vocal cords, arytenoids, vestibular folds, ventricles, pyriform sinuses, and aryepiglottic folds appear normal bilaterally. Vocal cords mobile with good contact b/l.    IMAGING/ADDITIONAL STUDIES:   < from: Xray Esophagram Single Contrast (08.31.21 @ 11:56) >    EXAM:  XR ESOPH SNGL CON STUDY                            PROCEDURE DATE:  08/31/2021            INTERPRETATION:  CLINICAL INFORMATION: Progressive dysphagia to solids and liquids for 5 months.    TECHNIQUE: An esophagram was performed under fluoroscopy utilizing thin barium as the contrast agent and multiple spot fluoroscopic images were taken.    Fluoro time: 0.9 minutes    FINDINGS:  Preliminary  radiograph of the partially visualized chest is unremarkable.    The patient swallowed barium without difficulty.    The esophagus demonstrates normal distensibility, contour and course. There is no abnormal extrinsic mass effect. Contrast passes freely into the stomach. No gastroesophageal reflux was demonstrated during this examination.    IMPRESSION:  No evidence of esophageal stricture or mass.   CC: globus sensation     HPI: 61 year old man with PMH DM2 presents with 5 month history of weight loss, proximal muscle weakness, likely neuromuscular dysphagia found to have very elevated troponin levels and normal EKG. Recent Echo and EGD mostly within normal limits. Outpatient labs with elevations in CK, troponin, aldolase, and CRP but normal TSH, SAMIA, antibodies for smith, RNP, centromere, scleroderma, and dsDNA. Per chart, overall picture favors inflammatory myopathy; without obvious rash favor polymyositis vs inclusion body myositis vs necrotizing autoimmune myositis. Also on the differential are malignancy, HIV, amyloidosis, TB, or chronic infection.   ENT consulted to evaluate pts upper airway as he complains of a lump in his throat after swallowing certain things, he reports specifically a small pill was hard to get down, eventually went down with water. Pt says he feels a lump around his farzaneh's apple when this happens. Pt seen by speech and swallow, subjective eval so far favors esophageal > oropharyngeal dysphagia. Pt currently on regular diet, pending MBS. Pt denies voice changes, throat pain or difficulty breathing    PAST MEDICAL & SURGICAL HISTORY:  Type 2 diabetes mellitus    History of esophagogastroduodenoscopy (EGD)      Allergies    No Known Allergies    Intolerances      MEDICATIONS  (STANDING):  aspirin enteric coated 81 milliGRAM(s) Oral daily  dextrose 40% Gel 15 Gram(s) Oral once  dextrose 5%. 1000 milliLiter(s) (100 mL/Hr) IV Continuous <Continuous>  dextrose 5%. 1000 milliLiter(s) (50 mL/Hr) IV Continuous <Continuous>  dextrose 50% Injectable 25 Gram(s) IV Push once  dextrose 50% Injectable 12.5 Gram(s) IV Push once  dextrose 50% Injectable 25 Gram(s) IV Push once  enoxaparin Injectable 40 milliGRAM(s) SubCutaneous daily  glucagon  Injectable 1 milliGRAM(s) IntraMuscular once  insulin glargine Injectable (LANTUS) 5 Unit(s) SubCutaneous at bedtime  insulin lispro (ADMELOG) corrective regimen sliding scale   SubCutaneous three times a day before meals  insulin lispro (ADMELOG) corrective regimen sliding scale   SubCutaneous at bedtime  lidocaine   4% Patch 1 Patch Transdermal once  multivitamin 1 Tablet(s) Oral daily  thiamine 100 milliGRAM(s) Oral daily    MEDICATIONS  (PRN):      Social History: Never smoker, never drinker, no drugs.  Monogamous with wife, no STIs  Lives with wife and son's family. daughter in law helps take care of him.  Worked in [employer] before assisted.    Family history:   FH: leukemia (Father)        ROS:   ENT: all negative except as noted in HPI   Skin: No itching, dryness, rash, changes to hair, or skin masses  CV: denies palpitations  Pulm: denies SOB, cough, hemoptysis  GI: denies change in appetite, indigestion, n/v  : denies pertinent urinary symptoms, urgency  Neuro: denies numbness/tingling, loss of sensation  Psych: denies anxiety  MS: denies muscle weakness, instability  Heme: denies easy bruising or bleeding  Endo: denies heat/cold intolerance, excessive sweating  Vascular: denies LE edema    Vital Signs Last 24 Hrs  T(C): 36.9 (01 Sep 2021 11:20), Max: 37.1 (31 Aug 2021 20:00)  T(F): 98.5 (01 Sep 2021 11:20), Max: 98.7 (31 Aug 2021 20:00)  HR: 97 (01 Sep 2021 11:20) (97 - 115)  BP: 105/67 (01 Sep 2021 11:20) (100/58 - 115/62)  BP(mean): --  RR: 18 (01 Sep 2021 11:20) (16 - 18)  SpO2: 95% (01 Sep 2021 11:20) (95% - 100%)                          10.2   15.03 )-----------( 387      ( 01 Sep 2021 06:19 )             33.0    09-01    135  |  96  |  13  ----------------------------<  138<H>  4.3   |  22  |  0.53    Ca    9.7      01 Sep 2021 07:57  Phos  4.4     09-01  Mg     2.0     09-01    TPro  7.5  /  Alb  2.8<L>  /  TBili  0.4  /  DBili  x   /  AST  125<H>  /  ALT  174<H>  /  AlkPhos  89  09-01   PT/INR - ( 31 Aug 2021 07:13 )   PT: 14.2 sec;   INR: 1.19 ratio         PTT - ( 31 Aug 2021 07:13 )  PTT:27.4 sec    PHYSICAL EXAM:  Gen: NAD  Skin: No rashes, bruises, or lesions  Head: Normocephalic, Atraumatic  Face: no edema, erythema, or fluctuance. Parotid glands soft without mass  Eyes: no scleral injection  Nose: Nares bilaterally patent, no discharge  Mouth: No Stridor / Drooling / Trismus.  Mucosa moist, tongue/uvula midline, oropharynx clear  Neck: Flat, supple, no lymphadenopathy, trachea midline, no masses  Lymphatic: No lymphadenopathy  Resp: breathing easily, no stridor  CV: no peripheral edema/cyanosis  GI: nondistended   Peripheral vascular: no JVD or edema  Neuro: facial nerve intact, no facial droop      Fiberoptic Indirect laryngoscopy:  (Scope #2 used)  Reason for Laryngoscopy: Globus sensation, dysphagia    Patient was unable to cooperate with mirror.  Nasopharynx, oropharynx, and hypopharynx clear, no bleeding. Tongue base, posterior pharyngeal wall, vallecula, epiglottis, and subglottis appear normal. No erythema, edema, masses or lesions. Moderate pooling of secretions. Airway patent, no foreign body visualized. No glottic/supraglottic edema. True vocal cords, arytenoids, vestibular folds, ventricles, pyriform sinuses, and aryepiglottic folds appear normal bilaterally. Vocal cords mobile with good contact b/l.    IMAGING/ADDITIONAL STUDIES:   < from: Xray Esophagram Single Contrast (08.31.21 @ 11:56) >    EXAM:  XR ESOPH SNGL CON STUDY                            PROCEDURE DATE:  08/31/2021            INTERPRETATION:  CLINICAL INFORMATION: Progressive dysphagia to solids and liquids for 5 months.    TECHNIQUE: An esophagram was performed under fluoroscopy utilizing thin barium as the contrast agent and multiple spot fluoroscopic images were taken.    Fluoro time: 0.9 minutes    FINDINGS:  Preliminary  radiograph of the partially visualized chest is unremarkable.    The patient swallowed barium without difficulty.    The esophagus demonstrates normal distensibility, contour and course. There is no abnormal extrinsic mass effect. Contrast passes freely into the stomach. No gastroesophageal reflux was demonstrated during this examination.    IMPRESSION:  No evidence of esophageal stricture or mass.   CC: globus sensation     HPI: 61 year old man with PMH DM2 presents with 5 month history of weight loss, proximal muscle weakness, likely neuromuscular dysphagia found to have very elevated troponin levels and normal EKG. Recent Echo and EGD mostly within normal limits. Outpatient labs with elevations in CK, troponin, aldolase, and CRP but normal TSH, SAMIA, antibodies for smith, RNP, centromere, scleroderma, and dsDNA. Per chart, overall picture favors inflammatory myopathy; without obvious rash favor polymyositis vs inclusion body myositis vs necrotizing autoimmune myositis. Also on the differential are malignancy, HIV, amyloidosis, TB, or chronic infection.   ENT consulted to evaluate pts upper airway as he complains of a lump in his throat after swallowing certain things, he reports specifically a small pill was hard to get down, eventually went down with water. Pt says he feels a lump around his farzaneh's apple when this happens. Pt seen by speech and swallow, subjective eval so far favors esophageal > oropharyngeal dysphagia. Pt currently on regular diet, pending MBS. Pt denies voice changes, throat pain or difficulty breathing    PAST MEDICAL & SURGICAL HISTORY:  Type 2 diabetes mellitus    History of esophagogastroduodenoscopy (EGD)      Allergies    No Known Allergies    Intolerances      MEDICATIONS  (STANDING):  aspirin enteric coated 81 milliGRAM(s) Oral daily  dextrose 40% Gel 15 Gram(s) Oral once  dextrose 5%. 1000 milliLiter(s) (100 mL/Hr) IV Continuous <Continuous>  dextrose 5%. 1000 milliLiter(s) (50 mL/Hr) IV Continuous <Continuous>  dextrose 50% Injectable 25 Gram(s) IV Push once  dextrose 50% Injectable 12.5 Gram(s) IV Push once  dextrose 50% Injectable 25 Gram(s) IV Push once  enoxaparin Injectable 40 milliGRAM(s) SubCutaneous daily  glucagon  Injectable 1 milliGRAM(s) IntraMuscular once  insulin glargine Injectable (LANTUS) 5 Unit(s) SubCutaneous at bedtime  insulin lispro (ADMELOG) corrective regimen sliding scale   SubCutaneous three times a day before meals  insulin lispro (ADMELOG) corrective regimen sliding scale   SubCutaneous at bedtime  lidocaine   4% Patch 1 Patch Transdermal once  multivitamin 1 Tablet(s) Oral daily  thiamine 100 milliGRAM(s) Oral daily    MEDICATIONS  (PRN):      Social History: Never smoker, never drinker, no drugs.  Monogamous with wife, no STIs  Lives with wife and son's family. daughter in law helps take care of him.  Worked in [employer] parts before assisted.    Family history:   FH: leukemia (Father)        ROS:   ENT: all negative except as noted in HPI   Skin: No itching, dryness, rash, changes to hair, or skin masses  CV: denies palpitations  Pulm: denies SOB, cough, hemoptysis  GI: denies change in appetite, indigestion, n/v  : denies pertinent urinary symptoms, urgency  Neuro: denies numbness/tingling, loss of sensation  Psych: denies anxiety  MS: denies muscle weakness, instability  Heme: denies easy bruising or bleeding  Endo: denies heat/cold intolerance, excessive sweating  Vascular: denies LE edema    Vital Signs Last 24 Hrs  T(C): 36.9 (01 Sep 2021 11:20), Max: 37.1 (31 Aug 2021 20:00)  T(F): 98.5 (01 Sep 2021 11:20), Max: 98.7 (31 Aug 2021 20:00)  HR: 97 (01 Sep 2021 11:20) (97 - 115)  BP: 105/67 (01 Sep 2021 11:20) (100/58 - 115/62)  BP(mean): --  RR: 18 (01 Sep 2021 11:20) (16 - 18)  SpO2: 95% (01 Sep 2021 11:20) (95% - 100%)                          10.2   15.03 )-----------( 387      ( 01 Sep 2021 06:19 )             33.0    09-01    135  |  96  |  13  ----------------------------<  138<H>  4.3   |  22  |  0.53    Ca    9.7      01 Sep 2021 07:57  Phos  4.4     09-01  Mg     2.0     09-01    TPro  7.5  /  Alb  2.8<L>  /  TBili  0.4  /  DBili  x   /  AST  125<H>  /  ALT  174<H>  /  AlkPhos  89  09-01   PT/INR - ( 31 Aug 2021 07:13 )   PT: 14.2 sec;   INR: 1.19 ratio         PTT - ( 31 Aug 2021 07:13 )  PTT:27.4 sec    PHYSICAL EXAM:  Gen: NAD  Skin: No rashes, bruises, or lesions  Head: Normocephalic, Atraumatic  Face: no edema, erythema, or fluctuance. Parotid glands soft without mass  Eyes: no scleral injection  Nose: Nares bilaterally patent, no discharge  Mouth: No Stridor / Drooling / Trismus.  Mucosa moist, tongue/uvula midline, oropharynx clear  Neck: Flat, supple, no lymphadenopathy, trachea midline, no masses  Lymphatic: No lymphadenopathy  Resp: breathing easily, no stridor  CV: no peripheral edema/cyanosis  GI: nondistended   Peripheral vascular: no JVD or edema  Neuro: facial nerve intact, no facial droop      Fiberoptic Indirect laryngoscopy:  (Scope #2 used)  Reason for Laryngoscopy: Globus sensation, dysphagia    Patient was unable to cooperate with mirror.  Nasopharynx, oropharynx, and hypopharynx clear, no bleeding. Tongue base, posterior pharyngeal wall, vallecula, epiglottis, and subglottis appear normal. No erythema, edema, masses or lesions. Moderate pooling of secretions. Airway patent, no foreign body visualized. No glottic/supraglottic edema. True vocal cords, arytenoids, vestibular folds, ventricles, pyriform sinuses, and aryepiglottic folds appear normal bilaterally. Vocal cords mobile with good contact b/l.  + intra-arytenoid bar and some reflux changes     IMAGING/ADDITIONAL STUDIES:   < from: Xray Esophagram Single Contrast (08.31.21 @ 11:56) >    EXAM:  XR ESOPH SNGL CON STUDY                            PROCEDURE DATE:  08/31/2021            INTERPRETATION:  CLINICAL INFORMATION: Progressive dysphagia to solids and liquids for 5 months.    TECHNIQUE: An esophagram was performed under fluoroscopy utilizing thin barium as the contrast agent and multiple spot fluoroscopic images were taken.    Fluoro time: 0.9 minutes    FINDINGS:  Preliminary  radiograph of the partially visualized chest is unremarkable.    The patient swallowed barium without difficulty.    The esophagus demonstrates normal distensibility, contour and course. There is no abnormal extrinsic mass effect. Contrast passes freely into the stomach. No gastroesophageal reflux was demonstrated during this examination.    IMPRESSION:  No evidence of esophageal stricture or mass.

## 2021-09-01 NOTE — SWALLOW BEDSIDE ASSESSMENT ADULT - SLP GENERAL OBSERVATIONS
Pt encountered awake and alert, upright sitting at edge of bed on room air. A&Ox4. Able to follow directives. Speech intelligible, vocal quality clear. Pt denied pain; although expressed frustration with overall condition.

## 2021-09-01 NOTE — PROGRESS NOTE ADULT - ASSESSMENT
60 yo M with PMHx of Diabetes and short term statin use presents with subacute progressively worsening proximal muscle weakness, dysphagia and 35 lb weight loss x 5 month. Physical exam significant for proximal weakness, no skin rash. CK in 3000s range.      # inflammatory myopathy  - differential polymyositis, dermatomyositis (lack of skin findings does not support this), inclusion body myositis (usually a distal myositis), statin induced myopathy (unlikely as he hasn't been on a statin in a few years) vs. necrotizing autoimmune myopathy  - Myomarker panel and HMG-CoA Reductase antibody pending    - hep panel negative   - MRI of left thigh completed on 9/1   - will start Solumedrol 500mg IV x 3 days (9/1- ), start PPI prophylaxis, PCP prophylaxis    - f/u immunoglobulin panel tomorrow, will likely start IVIG        Varinder Knapp, PGY-4  Rheumatology Fellow   Pager: [phone] 60 yo M with PMHx of Diabetes and short term statin use presents with subacute progressively worsening proximal muscle weakness, dysphagia and 35 lb weight loss x 5 month. Physical exam significant for proximal weakness, no skin rash. CK in 3000s range.      # inflammatory myopathy  - differential polymyositis, dermatomyositis (lack of skin findings does not support this), inclusion body myositis (usually a distal myositis), statin induced myopathy (unlikely as he hasn't been on a statin in a few years) vs. necrotizing autoimmune myopathy  - Myomarker panel and HMG-CoA Reductase antibody pending    - hep panel negative   - MRI of left thigh completed on 9/1   - will start Solumedrol 500mg IV x 3 days (9/1- ), start PPI prophylaxis, PCP prophylaxis    - f/u immunoglobulin panel tomorrow, will likely start IVIG    - CT c/a/p: negative for intrathoracic and intra-abd malignancy    - age appropriate malignancy screen in outpt     Varinder Knapp, PGY-4  Rheumatology Fellow   Pager: [phone] 62 yo M with PMHx of Diabetes and short term statin use presents with subacute progressively worsening proximal muscle weakness, dysphagia and 35 lb weight loss x 5 month. Physical exam significant for proximal weakness, no skin rash. CK in 3000s range.      # inflammatory myopathy  - differential polymyositis, dermatomyositis (lack of skin findings does not support this), inclusion body myositis (usually a distal myositis), statin induced myopathy (unlikely as he hasn't been on a statin in a few years) vs. necrotizing autoimmune myopathy  - Myomarker panel and HMG-CoA Reductase antibody pending    - hep panel negative   - MRI of left thigh completed on 9/1   - will start Solumedrol 500mg IV x 3 days (9/1- ), start GI prophylaxis, PCP prophylaxis    - f/u immunoglobulin panel tomorrow, will likely start IVIG    - CT c/a/p: negative for intrathoracic and intra-abd malignancy    - age appropriate malignancy screen in outpt     Varinder Knapp, PGY-4  Rheumatology Fellow   Pager: [phone] 62 yo M with PMHx of Diabetes and short term statin use presents with subacute progressively worsening proximal muscle weakness, dysphagia and 35 lb weight loss x 5 month. Physical exam significant for proximal weakness, no skin rash. CK in 3000s range.      # inflammatory myopathy  - differential polymyositis, dermatomyositis (lack of skin findings does not support this), inclusion body myositis (usually a distal myositis), statin induced myopathy (unlikely as he hasn't been on a statin in a few years) vs. necrotizing autoimmune myopathy  - Myomarker panel and HMG-CoA Reductase antibody pending    - hep panel negative   - MRI completed on 9/1- showed b/l diffuse muscle edema and enhancement, please consult general surgery tomorrow for muscle bx    - will start Solumedrol 500mg IV x 3 days (9/1- ), start GI prophylaxis, PCP prophylaxis    - f/u immunoglobulin panel tomorrow, will likely start IVIG    - CT c/a/p: negative for intrathoracic and intra-abd malignancy    - age appropriate malignancy screen in outpt     Varinder Knapp, PGY-4  Rheumatology Fellow   Pager: [phone]

## 2021-09-01 NOTE — PROGRESS NOTE ADULT - SUBJECTIVE AND OBJECTIVE BOX
Patient is a 61y old  Male who presents with a chief complaint of Myositis (01 Sep 2021 14:08)      SUBJECTIVE / OVERNIGHT EVENTS:    Tele reviewed:       ADDITIONAL REVIEW OF SYSTEMS: Negative except for above    MEDICATIONS  (STANDING):  aspirin enteric coated 81 milliGRAM(s) Oral daily  dextrose 40% Gel 15 Gram(s) Oral once  dextrose 5%. 1000 milliLiter(s) (100 mL/Hr) IV Continuous <Continuous>  dextrose 5%. 1000 milliLiter(s) (50 mL/Hr) IV Continuous <Continuous>  dextrose 50% Injectable 25 Gram(s) IV Push once  dextrose 50% Injectable 12.5 Gram(s) IV Push once  dextrose 50% Injectable 25 Gram(s) IV Push once  glucagon  Injectable 1 milliGRAM(s) IntraMuscular once  insulin glargine Injectable (LANTUS) 5 Unit(s) SubCutaneous at bedtime  insulin lispro (ADMELOG) corrective regimen sliding scale   SubCutaneous three times a day before meals  insulin lispro (ADMELOG) corrective regimen sliding scale   SubCutaneous at bedtime  lactated ringers. 1000 milliLiter(s) (75 mL/Hr) IV Continuous <Continuous>  lidocaine   4% Patch 1 Patch Transdermal once  methylPREDNISolone sodium succinate IVPB 500 milliGRAM(s) IV Intermittent daily  multivitamin 1 Tablet(s) Oral daily  pantoprazole    Tablet 40 milliGRAM(s) Oral before breakfast  thiamine 100 milliGRAM(s) Oral daily  trimethoprim  160 mG/sulfamethoxazole 800 mG 1 Tablet(s) Oral daily    MEDICATIONS  (PRN):      CAPILLARY BLOOD GLUCOSE      POCT Blood Glucose.: 116 mg/dL (01 Sep 2021 16:58)  POCT Blood Glucose.: 178 mg/dL (01 Sep 2021 11:52)  POCT Blood Glucose.: 135 mg/dL (01 Sep 2021 06:07)  POCT Blood Glucose.: 136 mg/dL (31 Aug 2021 23:57)  POCT Blood Glucose.: 106 mg/dL (31 Aug 2021 21:11)    I&O's Summary    31 Aug 2021 07:01  -  01 Sep 2021 07:00  --------------------------------------------------------  IN: 770 mL / OUT: 500 mL / NET: 270 mL    01 Sep 2021 07:01  -  01 Sep 2021 18:02  --------------------------------------------------------  IN: 120 mL / OUT: 250 mL / NET: -130 mL        PHYSICAL EXAM:  Vital Signs Last 24 Hrs  T(C): 36.9 (01 Sep 2021 11:20), Max: 37.1 (31 Aug 2021 20:00)  T(F): 98.5 (01 Sep 2021 11:20), Max: 98.7 (31 Aug 2021 20:00)  HR: 97 (01 Sep 2021 11:) (97 - 115)  BP: 105/67 (01 Sep 2021 11:20) (100/58 - 115/62)  BP(mean): --  RR: 18 (01 Sep 2021 11:20) (17 - 18)  SpO2: 95% (01 Sep 2021 11:20) (95% - 100%)    PHYSICAL EXAM:  GENERAL: NAD, well-developed  HEAD:  Atraumatic, Normocephalic  EYES:  conjunctiva and sclera clear  NECK: Supple, No JVD  CHEST/LUNG: Clear to auscultation bilaterally; No wheeze  HEART: Regular rate and rhythm; No murmurs, rubs, or gallops  ABDOMEN: Soft, Nontender, Nondistended; Bowel sounds present  EXTREMITIES:  2+ Peripheral Pulses, No clubbing, cyanosis, or edema  PSYCH: AAOx3  NEUROLOGY: non-focal  SKIN: No rashes or lesions      LABS:                        10.2   15.03 )-----------( 387      ( 01 Sep 2021 06:19 )             33.0     09-01    135  |  96  |  13  ----------------------------<  138<H>  4.3   |  22  |  0.53    Ca    9.7      01 Sep 2021 07:57  Phos  4.4     09-  Mg     2.0     09-    TPro  7.5  /  Alb  2.8<L>  /  TBili  0.4  /  DBili  x   /  AST  125<H>  /  ALT  174<H>  /  AlkPhos  89  09-    PT/INR - ( 31 Aug 2021 07:13 )   PT: 14.2 sec;   INR: 1.19 ratio         PTT - ( 31 Aug 2021 07:13 )  PTT:27.4 sec  CARDIAC MARKERS ( 01 Sep 2021 07:57 )  x     / x     / 3244 U/L / x     / 206.6 ng/mL  CARDIAC MARKERS ( 31 Aug 2021 09:12 )  x     / x     / 3115 U/L / x     / 153.1 ng/mL  CARDIAC MARKERS ( 30 Aug 2021 18:26 )  x     / x     / x     / x     / 167.5 ng/mL      Urinalysis Basic - ( 30 Aug 2021 22:02 )    Color: Light Yellow / Appearance: Clear / S.009 / pH: x  Gluc: x / Ketone: Negative  / Bili: Negative / Urobili: Negative   Blood: x / Protein: Trace / Nitrite: Negative   Leuk Esterase: Negative / RBC: 1 /hpf / WBC 0 /HPF   Sq Epi: x / Non Sq Epi: 0 /hpf / Bacteria: Negative          RADIOLOGY & ADDITIONAL TESTS:    Imaging Personally Reviewed:    Electrocardiogram Personally Reviewed:    COORDINATION OF CARE:  Care Discussed with Consultants/Other Providers [Y/N]:  Prior or Outpatient Records Reviewed [Y/N]:   KARLA LORENZ  61y  MRN: [id number]    Subjective: Patient is a 61y old  Male who presents with a chief complaint of Myositis (31 Aug 2021 09:28)       Interval history/overnight events:  MRI of leg. Started regular diet per Speech&Swallow eval.     Assessed at bedside. Still with weakness, but witnessed patient walking to bathroom after needing help to get into standing position. GI entered room while patient signing MRI safety sheet. No other complaints at this time.     MEDICATIONS  (STANDING):  aspirin enteric coated 81 milliGRAM(s) Oral daily  dextrose 40% Gel 15 Gram(s) Oral once  dextrose 5%. 1000 milliLiter(s) (50 mL/Hr) IV Continuous <Continuous>  dextrose 5%. 1000 milliLiter(s) (100 mL/Hr) IV Continuous <Continuous>  dextrose 50% Injectable 25 Gram(s) IV Push once  dextrose 50% Injectable 12.5 Gram(s) IV Push once  dextrose 50% Injectable 25 Gram(s) IV Push once  enoxaparin Injectable 40 milliGRAM(s) SubCutaneous daily  glucagon  Injectable 1 milliGRAM(s) IntraMuscular once  insulin glargine Injectable (LANTUS) 5 Unit(s) SubCutaneous at bedtime  insulin lispro (ADMELOG) corrective regimen sliding scale   SubCutaneous three times a day before meals  insulin lispro (ADMELOG) corrective regimen sliding scale   SubCutaneous at bedtime  multivitamin 1 Tablet(s) Oral daily  thiamine 100 milliGRAM(s) Oral daily    MEDICATIONS  (PRN):        Objective:    Vitals: Vital Signs Last 24 Hrs  T(C): 36.8 (21 @ 08:48), Max: 37.2 (21 @ 01:20)  T(F): 98.2 (21 @ 08:48), Max: 99 (21 @ 01:20)  HR: 99 (21 @ 08:48) (92 - 111)  BP: 91/56 (21 @ 08:48) (91/56 - 137/75)  BP(mean): --  RR: 17 (21 @ 08:48) (16 - 18)  SpO2: 96% (21 @ 08:48) (96% - 100%)            I&O's Summary      PHYSICAL EXAM:  GENERAL: NAD, slight temporal wasting  HEENT: PERRL, no scleral icterus, no head and neck lymphadenopathy  CHEST/LUNG: CTAB, no increased respiratory effort  HEART: Tachycardic, no murmurs appreciated  ABDOMEN: soft, nondistended, non-tender, normoactive BS  SKIN: Dry skin behind knees, from winter rash per patient  NERVOUS SYSTEM: Alert & Oriented X3, 3/5 strength in shoulder flexion, 5/5  strength, 1/5 strength hip flexion, 4/5 knee flexion/extension, 5/5 plantarflexion/dorsiflexion, sensation intact all throughout, 2+ patellar reflexes, unable to elicit biceps or brachioradialis reflex  EXT: no peripheral edema  PSYCH: calm and cooperative     LABS:        133<L>  |  97  |  11  ----------------------------<  139<H>  4.5   |  27  |  0.53      133<L>  |  94<L>  |  16  ----------------------------<  160<H>  4.6   |  27  |  0.54    Ca    8.8      31 Aug 2021 07:07  Ca    9.2      30 Aug 2021 18:26  Phos  4.0       Mg     2.0         TPro  6.3  /  Alb  2.4<L>  /  TBili  0.2  /  DBili  x   /  AST  111<H>  /  ALT  155<H>  /  AlkPhos  73    TPro  7.3  /  Alb  3.0<L>  /  TBili  0.2  /  DBili  x   /  AST  139<H>  /  ALT  192<H>  /  AlkPhos  81  08-30    PT/INR - ( 31 Aug 2021 07:13 )   PT: 14.2 sec;   INR: 1.19 ratio         PTT - ( 31 Aug 2021 07:13 )  PTT:27.4 sec              Urinalysis Basic - ( 30 Aug 2021 22:02 )    Color: Light Yellow / Appearance: Clear / S.009 / pH: x  Gluc: x / Ketone: Negative  / Bili: Negative / Urobili: Negative   Blood: x / Protein: Trace / Nitrite: Negative   Leuk Esterase: Negative / RBC: 1 /hpf / WBC 0 /HPF   Sq Epi: x / Non Sq Epi: 0 /hpf / Bacteria: Negative                              9.7    12.97 )-----------( 369      ( 31 Aug 2021 07:08 )             30.5                         10.4   17.23 )-----------( 408      ( 30 Aug 2021 18:26 )             33.4     CAPILLARY BLOOD GLUCOSE      POCT Blood Glucose.: 131 mg/dL (31 Aug 2021 07:24)  POCT Blood Glucose.: 97 mg/dL (31 Aug 2021 00:12)          RADIOLOGY & ADDITIONAL TESTS:            Imaging Personally Reviewed:  [ ] YES  [ ] NO    Consultants involved in case:   Consultant(s) Notes Reviewed:  [ ] YES  [ ] NO:   Care Discussed with Consultants/Other Providers [ ] YES  [ ] NO

## 2021-09-01 NOTE — CONSULT NOTE ADULT - ASSESSMENT
60 yo male w progressive muscle weakness with associated dysphagia to solids and liquids (likely esophageal dysphagia) and 40 lb weight loss in past 5 months c/o intermittent globus sensation, most notably after a small pill. Laryngoscopy with mobile VCs b/l. No foreign bodies (i.e pills or food boluses) or acute obstructing pathology identified.  60 yo male w progressive muscle weakness with associated dysphagia to solids and liquids (likely esophageal dysphagia) and 40 lb weight loss in past 5 months c/o intermittent globus sensation, most notably after a small pill. Laryngoscopy with mobile VCs b/l. No foreign bodies (i.e pills or food boluses) or acute obstructing pathology identified. did have some laryngeal reflux which is likely to explain the sensation of globus

## 2021-09-01 NOTE — PROGRESS NOTE ADULT - SUBJECTIVE AND OBJECTIVE BOX
Vascular Cardiology Consult Note    SERVICE CONSULT: [phone]        OFFICE [phone]    CC: TIJERINA / Muscle Weakness / Dysphagia    Interval Events:  Cardiac Cath : Non-obstructive CAD.  R Radial access site without issues.  Troponin T continues to rise 1300 today.  CPK rising to 3200.  Denies C/P. No current SOB.  No LE pain or swelling.  Echo reviewed, mid cavity gradients noted, hyperdynamic LV.    Allergies  No Known Allergies    MEDICATIONS  (STANDING):  aspirin enteric coated 81 milliGRAM(s) Oral daily  enoxaparin Injectable 40 milliGRAM(s) SubCutaneous daily  glucagon  Injectable 1 milliGRAM(s) IntraMuscular once  insulin glargine Injectable (LANTUS) 5 Unit(s) SubCutaneous at bedtime  insulin lispro (ADMELOG) corrective regimen sliding scale   SubCutaneous every 6 hours  lidocaine   4% Patch 1 Patch Transdermal once  multivitamin 1 Tablet(s) Oral daily  thiamine 100 milliGRAM(s) Oral daily    PAST MEDICAL & SURGICAL HISTORY:  Type 2 diabetes mellitus  History of esophagogastroduodenoscopy (EGD)    FAMILY HISTORY:  FH: leukemia (Father)    SOCIAL HISTORY:  unchanged    REVIEW OF SYSTEMS:  CONSTITUTIONAL: No fever  EYES: No eye pain  ENT:  No throat pain  NECK: No pain  RESPIRATORY: TIJERINA  CARDIOVASCULAR: No C/P   GASTROINTESTINAL: No abdominal pain  GENITOURINARY: No hematuria  NEUROLOGICAL: loss of strength  SKIN: rash to L UE  LYMPH Nodes: No enlarged glands noted  ENDOCRINE: No heat or cold intolerance noted  MUSCULOSKELETAL: No swelling or extremity pain  PSYCHIATRIC: No depression, anxiety  HEME/LYMPH: No  bleeding gums  ALLERGY AND IMMUNOLOGIC: No hives    [ x] All others negative	    PHYSICAL EXAM:  Vital Signs Last 24 Hrs  T(C): 36.9 (01 Sep 2021 11:20), Max: 37.2 (31 Aug 2021 13:39)  T(F): 98.5 (01 Sep 2021 11:20), Max: 98.9 (31 Aug 2021 13:39)  HR: 97 (01 Sep 2021 11:20) (97 - 115)  BP: 105/67 (01 Sep 2021 11:20) (100/58 - 115/62)  RR: 18 (01 Sep 2021 11:20) (16 - 18)  SpO2: 95% (01 Sep 2021 11:20) (95% - 100%)    Appearance:  	  HEENT:  NC/AT  Cardiovascular: Regular Rhythm, Tachycardic, S1 and S2   Respiratory: CTA B/L  Psychiatry:  AAO x 3  Gastrointestinal:  Soft, Non-tender, + BS	  Skin: No ecchymoses, No cyanosis	  Neurologic: No focal deficits noted  Extremities: No LE edema, B/L calves soft  Foot Exam: No tissue loss or ulceration  R Radial Site: C/D/I, Radial Pulse 2+    LABS:	 	                        10.2   15.03 )-----------( 387      ( 01 Sep 2021 06:19 )             33.0     09-    135  |  96  |  13  ----------------------------<  138<H>  4.3   |  22  |  0.53    Ca    9.7      01 Sep 2021 07:57  Phos  4.4     -  Mg     2.0         TPro  7.5  /  Alb  2.8<L>  /  TBili  0.4  /  DBili  x   /  AST  125<H>  /  ALT  174<H>  /  AlkPhos  89  09-    PT/INR - ( 31 Aug 2021 07:13 )   PT: 14.2 sec;   INR: 1.19 ratio      PTT - ( 31 Aug 2021 07:13 )  PTT:27.4 sec    CARDIAC MARKERS ( 01 Sep 2021 07:57 )  x     / x     / 3244 U/L / x     / 206.6 ng/mL  CARDIAC MARKERS ( 31 Aug 2021 09:12 )  x     / x     / 3115 U/L / x     / 153.1 ng/mL  CARDIAC MARKERS ( 30 Aug 2021 18:26 )  x     / x     / x     / x     / 167.5 ng/mL    Urinalysis Basic - ( 30 Aug 2021 22:02 )    Color: Light Yellow / Appearance: Clear / S.009 / pH: x  Gluc: x / Ketone: Negative  / Bili: Negative / Urobili: Negative   Blood: x / Protein: Trace / Nitrite: Negative   Leuk Esterase: Negative / RBC: 1 /hpf / WBC 0 /HPF   Sq Epi: x / Non Sq Epi: 0 /hpf / Bacteria: Negative    Assessment:  1. Troponin T Elevated      No active C/P or SOB      Cardiac Cath : non-obstructive CAD  2. Myositis / CPK Elevated / Muscular Atrophy and Weakness  3. Dysphagia  4. Weight Loss   5. DM  6. Colon Thickening on prior CT  7. Anemia / Iron Deficiency Anemia     Plan:  1. Due to Troponin T continuing to rise, recommend cardiac MRI to evaluate for myocarditis.      He did have the COVID vaccine this year.      He is C/P free. Only 40 % dRCA and luminal irregularities noted on cath.  2. Continue ASA for medical management of non-obstructive CAD.   3. He is cleared for a colonoscopy from a cardiac perspective.  4. While NPO, he should have continuous IV fluids due to the mid cavitary gradients on Echo.      Oral hydration encouraged in general.   5. Anemia evaluation and Iron repletion as per Primary Team and GI.  6. Statin on hold due to elevated CPK and concern for myositis.      Rheumatology consultation appreciated.  7. He is cleared for muscle biopsy from a cardiac perspective.  8. Dysphagia evaluation by GI / ENT / Speech and Swallow.  9. Agree with IV fluid hydration.  10. Recommend PSA and age appropriate CA screening with a colonoscopy.  11. Discussed with Primary Team.    Thank you  JUAN Witt, MPAS  Vascular Cardiology Service    Please call with any questions:   Service Line: [phone]  Office [phone]

## 2021-09-01 NOTE — PROGRESS NOTE ADULT - PROBLEM SELECTOR PLAN 2
Patients presentation of subacute-chronic weight loss, fatigue, proximal muscle weakness, (neuromuscular) dysphagia, and elevated troponins, CK, aldolase, CRP, and transaminases favor inflammatory myopathy. Without rash dermatomyositis is less likely. W/o any joint pain. Patient was prescribed statins upon discharge from [facility] but he did not report taking them and symptoms began prior to that point. DDx includes PM, IBM, NM. Will repeat TSH although was 3.4 as outpatient.   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 parvo pending

## 2021-09-02 LAB
ALBUMIN SERPL ELPH-MCNC: 3 G/DL — LOW (ref 3.3–5)
ALP SERPL-CCNC: 81 U/L — SIGNIFICANT CHANGE UP (ref 40–120)
ALT FLD-CCNC: 179 U/L — HIGH (ref 10–45)
ANION GAP SERPL CALC-SCNC: 17 MMOL/L — SIGNIFICANT CHANGE UP (ref 5–17)
APTT BLD: 30.4 SEC — SIGNIFICANT CHANGE UP (ref 27.5–35.5)
AST SERPL-CCNC: 109 U/L — HIGH (ref 10–40)
BASOPHILS # BLD AUTO: 0.01 K/UL — SIGNIFICANT CHANGE UP (ref 0–0.2)
BASOPHILS NFR BLD AUTO: 0.1 % — SIGNIFICANT CHANGE UP (ref 0–2)
BILIRUB SERPL-MCNC: 0.2 MG/DL — SIGNIFICANT CHANGE UP (ref 0.2–1.2)
BLD GP AB SCN SERPL QL: NEGATIVE — SIGNIFICANT CHANGE UP
BUN SERPL-MCNC: 21 MG/DL — SIGNIFICANT CHANGE UP (ref 7–23)
CALCIUM SERPL-MCNC: 8.8 MG/DL — SIGNIFICANT CHANGE UP (ref 8.4–10.5)
CHLORIDE SERPL-SCNC: 94 MMOL/L — LOW (ref 96–108)
CK MB BLD-MCNC: 9.9 % — HIGH (ref 0–3.5)
CK MB CFR SERPL CALC: 271.6 NG/ML — HIGH (ref 0–6.7)
CK SERPL-CCNC: 2738 U/L — HIGH (ref 30–200)
CO2 SERPL-SCNC: 23 MMOL/L — SIGNIFICANT CHANGE UP (ref 22–31)
CREAT SERPL-MCNC: 0.47 MG/DL — LOW (ref 0.5–1.3)
EOSINOPHIL # BLD AUTO: 0 K/UL — SIGNIFICANT CHANGE UP (ref 0–0.5)
EOSINOPHIL NFR BLD AUTO: 0 % — SIGNIFICANT CHANGE UP (ref 0–6)
GLUCOSE BLDC GLUCOMTR-MCNC: 252 MG/DL — HIGH (ref 70–99)
GLUCOSE BLDC GLUCOMTR-MCNC: 312 MG/DL — HIGH (ref 70–99)
GLUCOSE BLDC GLUCOMTR-MCNC: 331 MG/DL — HIGH (ref 70–99)
GLUCOSE BLDC GLUCOMTR-MCNC: 362 MG/DL — HIGH (ref 70–99)
GLUCOSE SERPL-MCNC: 318 MG/DL — HIGH (ref 70–99)
HCT VFR BLD CALC: 35.4 % — LOW (ref 39–50)
HGB BLD-MCNC: 11.1 G/DL — LOW (ref 13–17)
IGA FLD-MCNC: 492 MG/DL — SIGNIFICANT CHANGE UP (ref 84–499)
IGG FLD-MCNC: 1443 MG/DL — SIGNIFICANT CHANGE UP (ref 610–1660)
IGM SERPL-MCNC: 57 MG/DL — SIGNIFICANT CHANGE UP (ref 35–242)
IMM GRANULOCYTES NFR BLD AUTO: 0.6 % — SIGNIFICANT CHANGE UP (ref 0–1.5)
INR BLD: 1.2 RATIO — HIGH (ref 0.88–1.16)
KAPPA LC SER QL IFE: 4.26 MG/DL — HIGH (ref 0.33–1.94)
KAPPA/LAMBDA FREE LIGHT CHAIN RATIO, SERUM: 0.72 RATIO — SIGNIFICANT CHANGE UP (ref 0.26–1.65)
LAMBDA LC SER QL IFE: 5.89 MG/DL — HIGH (ref 0.57–2.63)
LYMPHOCYTES # BLD AUTO: 0.96 K/UL — LOW (ref 1–3.3)
LYMPHOCYTES # BLD AUTO: 8.9 % — LOW (ref 13–44)
MAGNESIUM SERPL-MCNC: 1.8 MG/DL — SIGNIFICANT CHANGE UP (ref 1.6–2.6)
MCHC RBC-ENTMCNC: 26.7 PG — LOW (ref 27–34)
MCHC RBC-ENTMCNC: 31.4 GM/DL — LOW (ref 32–36)
MCV RBC AUTO: 85.3 FL — SIGNIFICANT CHANGE UP (ref 80–100)
MONOCYTES # BLD AUTO: 0.04 K/UL — SIGNIFICANT CHANGE UP (ref 0–0.9)
MONOCYTES NFR BLD AUTO: 0.4 % — LOW (ref 2–14)
NEUTROPHILS # BLD AUTO: 9.7 K/UL — HIGH (ref 1.8–7.4)
NEUTROPHILS NFR BLD AUTO: 90 % — HIGH (ref 43–77)
NRBC # BLD: 0 /100 WBCS — SIGNIFICANT CHANGE UP (ref 0–0)
PHOSPHATE SERPL-MCNC: 5.2 MG/DL — HIGH (ref 2.5–4.5)
PLATELET # BLD AUTO: 425 K/UL — HIGH (ref 150–400)
POTASSIUM SERPL-MCNC: 4.6 MMOL/L — SIGNIFICANT CHANGE UP (ref 3.5–5.3)
POTASSIUM SERPL-SCNC: 4.6 MMOL/L — SIGNIFICANT CHANGE UP (ref 3.5–5.3)
PROT SERPL-MCNC: 7.2 G/DL — SIGNIFICANT CHANGE UP (ref 6–8.3)
PROTHROM AB SERPL-ACNC: 14.3 SEC — HIGH (ref 10.6–13.6)
RBC # BLD: 4.15 M/UL — LOW (ref 4.2–5.8)
RBC # FLD: 12.5 % — SIGNIFICANT CHANGE UP (ref 10.3–14.5)
RH IG SCN BLD-IMP: POSITIVE — SIGNIFICANT CHANGE UP
SODIUM SERPL-SCNC: 134 MMOL/L — LOW (ref 135–145)
TROPONIN T, HIGH SENSITIVITY RESULT: 640 NG/L — HIGH (ref 0–51)
WBC # BLD: 10.43 K/UL — SIGNIFICANT CHANGE UP (ref 3.8–10.5)
WBC # FLD AUTO: 10.43 K/UL — SIGNIFICANT CHANGE UP (ref 3.8–10.5)

## 2021-09-02 PROCEDURE — 99252 IP/OBS CONSLTJ NEW/EST SF 35: CPT | Mod: GC

## 2021-09-02 PROCEDURE — 99223 1ST HOSP IP/OBS HIGH 75: CPT

## 2021-09-02 PROCEDURE — 99232 SBSQ HOSP IP/OBS MODERATE 35: CPT | Mod: GC

## 2021-09-02 PROCEDURE — 99233 SBSQ HOSP IP/OBS HIGH 50: CPT | Mod: GC

## 2021-09-02 PROCEDURE — 99251: CPT

## 2021-09-02 PROCEDURE — 99233 SBSQ HOSP IP/OBS HIGH 50: CPT

## 2021-09-02 PROCEDURE — 74230 X-RAY XM SWLNG FUNCJ C+: CPT | Mod: 26

## 2021-09-02 RX ORDER — INSULIN GLARGINE 100 [IU]/ML
10 INJECTION, SOLUTION SUBCUTANEOUS AT BEDTIME
Refills: 0 | Status: DISCONTINUED | OUTPATIENT
Start: 2021-09-02 | End: 2021-09-02

## 2021-09-02 RX ORDER — HYDROCORTISONE 20 MG
100 TABLET ORAL DAILY
Refills: 0 | Status: DISCONTINUED | OUTPATIENT
Start: 2021-09-02 | End: 2021-09-07

## 2021-09-02 RX ORDER — ACETAMINOPHEN 500 MG
1000 TABLET ORAL ONCE
Refills: 0 | Status: COMPLETED | OUTPATIENT
Start: 2021-09-02 | End: 2021-09-02

## 2021-09-02 RX ORDER — INSULIN GLARGINE 100 [IU]/ML
8 INJECTION, SOLUTION SUBCUTANEOUS AT BEDTIME
Refills: 0 | Status: DISCONTINUED | OUTPATIENT
Start: 2021-09-02 | End: 2021-09-03

## 2021-09-02 RX ORDER — MAGNESIUM SULFATE 500 MG/ML
2 VIAL (ML) INJECTION ONCE
Refills: 0 | Status: COMPLETED | OUTPATIENT
Start: 2021-09-02 | End: 2021-09-02

## 2021-09-02 RX ORDER — INSULIN LISPRO 100/ML
4 VIAL (ML) SUBCUTANEOUS
Refills: 0 | Status: DISCONTINUED | OUTPATIENT
Start: 2021-09-02 | End: 2021-09-03

## 2021-09-02 RX ORDER — DIPHENHYDRAMINE HCL 50 MG
50 CAPSULE ORAL DAILY
Refills: 0 | Status: COMPLETED | OUTPATIENT
Start: 2021-09-02 | End: 2021-09-06

## 2021-09-02 RX ORDER — CHOLECALCIFEROL (VITAMIN D3) 125 MCG
1000 CAPSULE ORAL DAILY
Refills: 0 | Status: DISCONTINUED | OUTPATIENT
Start: 2021-09-02 | End: 2021-09-09

## 2021-09-02 RX ORDER — IMMUNE GLOBULIN (HUMAN) 10 G/100ML
25 INJECTION INTRAVENOUS; SUBCUTANEOUS DAILY
Refills: 0 | Status: COMPLETED | OUTPATIENT
Start: 2021-09-02 | End: 2021-09-06

## 2021-09-02 RX ADMIN — Medication 6: at 08:18

## 2021-09-02 RX ADMIN — Medication 1 TABLET(S): at 11:49

## 2021-09-02 RX ADMIN — Medication 50 MILLIGRAM(S): at 20:50

## 2021-09-02 RX ADMIN — Medication 8: at 12:20

## 2021-09-02 RX ADMIN — Medication 100 MILLIGRAM(S): at 11:49

## 2021-09-02 RX ADMIN — Medication 100 MILLIGRAM(S): at 06:33

## 2021-09-02 RX ADMIN — Medication 4: at 21:41

## 2021-09-02 RX ADMIN — IMMUNE GLOBULIN (HUMAN) 83.33 GRAM(S): 10 INJECTION INTRAVENOUS; SUBCUTANEOUS at 21:31

## 2021-09-02 RX ADMIN — Medication 1000 MILLIGRAM(S): at 20:50

## 2021-09-02 RX ADMIN — LIDOCAINE 1 PATCH: 4 CREAM TOPICAL at 06:50

## 2021-09-02 RX ADMIN — Medication 81 MILLIGRAM(S): at 11:49

## 2021-09-02 RX ADMIN — Medication 1000 UNIT(S): at 21:43

## 2021-09-02 RX ADMIN — PANTOPRAZOLE SODIUM 40 MILLIGRAM(S): 20 TABLET, DELAYED RELEASE ORAL at 06:33

## 2021-09-02 RX ADMIN — Medication 10: at 16:40

## 2021-09-02 RX ADMIN — Medication 50 GRAM(S): at 16:41

## 2021-09-02 RX ADMIN — INSULIN GLARGINE 8 UNIT(S): 100 INJECTION, SOLUTION SUBCUTANEOUS at 21:41

## 2021-09-02 RX ADMIN — Medication 4 UNIT(S): at 16:41

## 2021-09-02 NOTE — SWALLOW VFSS/MBS ASSESSMENT ADULT - RECOMMENDED FEEDING/EATING TECHNIQUES
IF PT WISHES TO CONTINUE ON AN ORAL DIET, THESE STRATEGIES MAY REDUCE (BUT NOT ELIMINATE) THE RISK OF ASPIRATION/allow for swallow between intakes/alternate food with liquid/crush medication (when feasible)/maintain upright posture during/after eating for 30 mins/no straws/provide rest periods between swallows/small sips/bites frequent throat clearing during PO intake; IF PT WISHES TO CONTINUE ON AN ORAL DIET, THESE STRATEGIES MAY REDUCE (BUT NOT ELIMINATE) THE RISK OF ASPIRATION/allow for swallow between intakes/alternate food with liquid/crush medication (when feasible)/maintain upright posture during/after eating for 30 mins/no straws/provide rest periods between swallows/small sips/bites

## 2021-09-02 NOTE — DIETITIAN INITIAL EVALUATION ADULT. - PROBLEM SELECTOR PLAN 2
Patients presentation of subacute-chronic weight loss, fatigue, proximal muscle weakness, (neuromuscular) dysphagia, and elevated troponins, CK, aldolase, CRP, and transaminases favor inflammatory myopathy. Without rash dermatomyositis is less likely. W/o any joint pain. Patient was prescribed statins upon discharge from [facility] but he did not report taking them and symptoms began prior to that point. DDx includes PM, IBM, NM. Will repeat TSH although was 3.4 as outpatient.   -f/u SAMIA, ESR  -rheumatology consult in AM  -tests for anti-Jo1 and anti-Mi2 were sent at [facility] but had not resulted at time of discharge. Repeating now but would cancel if can get records.   -patient would likely benefit from muscle biopsy  -also on DDX:  --TB - had calcified granuloma but no cough, night sweats, fevers, etc. - f/u quant gold  --amyloidosis - +protein gap and ?cardiac/hepatic dysfunction - f/u SPEP/UPEP/IF  --viral infection - f/u HIV, hepatitis panel, EBV, CMV, parvo  - less likely medication induced, most commonly due to statins and steroids which pt has not taken  - consider paraneoplastic- currently w/o known underlying neoplasm; f/up CT chest/abd/pelvis

## 2021-09-02 NOTE — SWALLOW VFSS/MBS ASSESSMENT ADULT - ORAL PHASE
Within functional limits within functional limits Reduced anterior - posterior transport/Residue in oral cavity

## 2021-09-02 NOTE — DIETITIAN INITIAL EVALUATION ADULT. - PROBLEM SELECTOR PLAN 5
reports taking 10-12 U of daily insulin as well as janumet. reports that A1c had been poorly controlled in the past. Most recent on outpatient paperwork was 8.1. it is possible that patient's dysphagia is related to gastroparesis although less likely.  -f/u A1c  -lantus 5 qhs  -ISS

## 2021-09-02 NOTE — SWALLOW VFSS/MBS ASSESSMENT ADULT - RECOMMENDED CONSISTENCY
NPO, with non-oral nutrition, hydration, medications is recommended based upon the results of this examination; however, patient stated he wishes to continue on an oral diet despite the risks/complications of aspiration.

## 2021-09-02 NOTE — PROGRESS NOTE ADULT - ASSESSMENT
61 year old man with type 2 diabetes presenting with progressive proximal muscle weakness, weight loss and dysphagia with normal x-ray esophagram     Outpatient labs with elevations in CK, troponin, aldolase, and CRP but normal TSH, SAMIA, antibodies for smith, RNP, centromere, scleroderma, and dsDNA. Overall picture favors inflammatory myopathy; without obvious rash favor polymyositis vs inclusion body myositis vs necrotizing autoimmune myositis. Also on the differential are malignancy, HIV, amyloidosis, TB, or chronic infection.    # Dysphagia - Associated with globus sensation, worse with liquids > solids. No evidence of mechanical obstruction (stricture, web, intraluminal mass) or esophageal phase of dysmotility on x-ray esophagram. 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 Please call back [phone] with questions.       Vannessa Jackson PGY-6  Gastroenterology/Hepatology Fellow  Pager #[phone]  E-mail [email or web address]  Call [phone] to speak to answering service for on-call GI fellow 5pm-7am and weekends.

## 2021-09-02 NOTE — PROGRESS NOTE ADULT - PROBLEM SELECTOR PLAN 1
troponin elevated on admission with subsequent downtrend 1022->991. EKg with sinus tachycardia and without evidence of ischemia. patient denies exertional (or any) chest pain dyspnea, orthopnea. BNP wnl. recent echo with LVEF 70%, normal RVSF, and no valvular abnormalities per discharge paperwork. Very low suspicion for ACS.  -suspect elevation in the setting of myositis vs myocarditis  - troponin uptrending, trend  - ECHO with hyperdynamic EF=75-80%, mild diastolic dysfunction grade I  - LHC w/ nonobstructive CAD  - c/w low dose aspirin  - continue to hold statins in the setting of myopathy and elevated AST/ALT  - c/w telemetry monitoring  - cardiac MRI to investigate myocarditis (COVID vaccine one month ago) troponin elevated on admission with subsequent downtrend 1022->991. EKg with sinus tachycardia and without evidence of ischemia. patient denies exertional (or any) chest pain dyspnea, orthopnea. BNP wnl. recent echo with LVEF 70%, normal RVSF, and no valvular abnormalities per discharge paperwork. Very low suspicion for ACS.  -suspect elevation in the setting of myositis vs myocarditis  - acute decrease in troponin after solumedrol  - ECHO with hyperdynamic EF=75-80%, mild diastolic dysfunction grade I  - LHC w/ nonobstructive CAD  - c/w low dose aspirin  - continue to hold statins in the setting of myopathy and elevated AST/ALT  - c/w telemetry monitoring  - cardiac MRI to investigate myocarditis (COVID vaccine one month ago)

## 2021-09-02 NOTE — PROGRESS NOTE ADULT - PROBLEM SELECTOR PLAN 6
Had colonic thickening on CT at OSH. planned for colonoscopy but held off due to elevated troponins and request for LHC prior to C-scope. per report, the thickening could have been due to underdistension.  -given current differential patient would likely benefit from malignancy workup anyway  - No evidence of malignancy on CT chest/abdo/pelvis   - GI following for dysphagia and colonic thickening, appreciate recs Had colonic thickening on CT at OSH. planned for colonoscopy but held off due to elevated troponins and request for LHC prior to C-scope. per report, the thickening could have been due to underdistension.  -given current differential patient would likely benefit from malignancy workup anyway  - No evidence of malignancy on CT chest/abdo/pelvis   - GI following for dysphagia and colonic thickening, signed off at this time

## 2021-09-02 NOTE — PHYSICAL THERAPY INITIAL EVALUATION ADULT - ADDITIONAL COMMENTS
as per pt: PTA pt was living in a  1 st floor set up and was independent in all functional mobility and ADL's. no AD for gait.

## 2021-09-02 NOTE — DIETITIAN INITIAL EVALUATION ADULT. - PERTINENT MEDS FT
lactated ringers, Admelog, Lantus, magnesium sulfate IVPB, Solu-Medrol, Protonix, multivitamin, thiamine

## 2021-09-02 NOTE — PROGRESS NOTE ADULT - SUBJECTIVE AND OBJECTIVE BOX
Chief Complaint:  Patient is a 61y old  Male who presents with a chief complaint of Myositis (02 Sep 2021 14:41)      Interval Events:     s/p LHC revealing non-obstructive LAD  Advanced to regular diet - patient reports no dysphagia to solids while eating slowly but admits to more difficulty swallowing liquids  Speech/swallow evaluation deferred yesterday as patient was kept NPO for LHC  Started on high dose steroids empirically by Rheumatology     Allergies:  No Known Allergies      Hospital Medications:  aspirin enteric coated 81 milliGRAM(s) Oral daily  dextrose 40% Gel 15 Gram(s) Oral once  dextrose 5%. 1000 milliLiter(s) IV Continuous <Continuous>  dextrose 5%. 1000 milliLiter(s) IV Continuous <Continuous>  dextrose 50% Injectable 25 Gram(s) IV Push once  dextrose 50% Injectable 12.5 Gram(s) IV Push once  dextrose 50% Injectable 25 Gram(s) IV Push once  glucagon  Injectable 1 milliGRAM(s) IntraMuscular once  insulin glargine Injectable (LANTUS) 5 Unit(s) SubCutaneous at bedtime  insulin lispro (ADMELOG) corrective regimen sliding scale   SubCutaneous three times a day before meals  insulin lispro (ADMELOG) corrective regimen sliding scale   SubCutaneous at bedtime  insulin lispro Injectable (ADMELOG) 4 Unit(s) SubCutaneous three times a day before meals  lactated ringers. 1000 milliLiter(s) IV Continuous <Continuous>  magnesium sulfate  IVPB 2 Gram(s) IV Intermittent once  methylPREDNISolone sodium succinate IVPB 500 milliGRAM(s) IV Intermittent daily  multivitamin 1 Tablet(s) Oral daily  pantoprazole    Tablet 40 milliGRAM(s) Oral before breakfast  thiamine 100 milliGRAM(s) Oral daily  trimethoprim  160 mG/sulfamethoxazole 800 mG 1 Tablet(s) Oral daily      PMHX/PSHX:  Type 2 diabetes mellitus    History of esophagogastroduodenoscopy (EGD)        Family history:  FH: leukemia (Father)        ROS:     General:  No weight loss, fevers, chills, night sweats, fatigue   Eyes:  No vision changes  ENT:  No sore throat, pain, runny nose  CV:  No chest pain, palpitations, dizziness   Resp:  No SOB, cough, wheezing  GI:  See HPI  :  No burning with urination, hematuria  Muscle:  No pain, weakness  Neuro:  No weakness/tingling, memory problems  Psych:  No fatigue, insomnia, mood problems, depression  Heme:  No easy bruisability  Skin:  No rash, edema      PHYSICAL EXAM:     GENERAL: Thin, well developed, no distress  HEENT:  Conjunctivae clear, sclera anicteric  CHEST:  No increased effort w/ respirations  HEART:  Regular rhythm & rate  ABDOMEN:  Soft, non-tender, non-distended  EXTREMITIES:  no LE  edema  SKIN:  No rash/erythema/ecchymoses/petechiae/wounds/jaundice  NEURO:  Alert, orientedx3    Vital Signs:  Vital Signs Last 24 Hrs  T(C): 36.4 (02 Sep 2021 11:16), Max: 37.2 (01 Sep 2021 20:07)  T(F): 97.5 (02 Sep 2021 11:16), Max: 98.9 (01 Sep 2021 20:07)  HR: 93 (02 Sep 2021 11:16) (83 - 105)  BP: 104/64 (02 Sep 2021 11:16) (101/66 - 118/72)  BP(mean): --  RR: 17 (02 Sep 2021 11:16) (16 - 18)  SpO2: 98% (02 Sep 2021 11:16) (95% - 98%)  Daily     Daily     LABS:                        11.1   10.43 )-----------( 425      ( 02 Sep 2021 10:31 )             35.4     09-02    134<L>  |  94<L>  |  21  ----------------------------<  318<H>  4.6   |  23  |  0.47<L>    Ca    8.8      02 Sep 2021 10:31  Phos  5.2     09-02  Mg     1.8     09-02    TPro  7.2  /  Alb  3.0<L>  /  TBili  0.2  /  DBili  x   /  AST  109<H>  /  ALT  179<H>  /  AlkPhos  81  09-02    LIVER FUNCTIONS - ( 02 Sep 2021 10:31 )  Alb: 3.0 g/dL / Pro: 7.2 g/dL / ALK PHOS: 81 U/L / ALT: 179 U/L / AST: 109 U/L / GGT: x           PT/INR - ( 02 Sep 2021 10:31 )   PT: 14.3 sec;   INR: 1.20 ratio    PTT - ( 02 Sep 2021 10:31 )  PTT:30.4 sec      Imaging:

## 2021-09-02 NOTE — PHYSICAL THERAPY INITIAL EVALUATION ADULT - MANUAL MUSCLE TESTING RESULTS, REHAB EVAL
grossly 3/5 t/o extremities  however noted weakness in prox muscles of bilat LE's approx 3/5/grossly assessed due to

## 2021-09-02 NOTE — CONSULT NOTE ADULT - ATTENDING COMMENTS
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  - Primary team requesting muscle biopsy of left quadriceps.  - Plan for OR and biopsy once pathology and surgeon available.
Signs and symptoms along with past hospitalization events were reviewed.  The patients PMH/PSH/FH/SH was gone over.  Agree with physical examination.  The patient is not having any cardiopulmonary complaints to suggest an ACS picture.  Concern that due to his constellation of symptoms he has a myositis.  As part of his cardiology workup TTE, cardiac catheterization and MRI will be performed.  The patient appears euvolemic on clinical examination.  Would recommend supplemental IV fluids.  EKG, laboratory studies and imaging studies were personally reviewed.  All questions and concerns were answered.
globus likely secondary to LPR, will Tx, see if improves

## 2021-09-02 NOTE — PROGRESS NOTE ADULT - SUBJECTIVE AND OBJECTIVE BOX
KARLA LORENZ  61y  MRN: [id number]    Subjective: Patient is a 61y old  Male who presents with a chief complaint of Myositis (31 Aug 2021 09:28)       Interval history/overnight events:  MRI of leg. Started regular diet per Speech&Swallow eval.     Assessed at bedside. Still with weakness, but witnessed patient walking to bathroom after needing help to get into standing position. GI entered room while patient signing MRI safety sheet. No other complaints at this time.     MEDICATIONS  (STANDING):  aspirin enteric coated 81 milliGRAM(s) Oral daily  dextrose 40% Gel 15 Gram(s) Oral once  dextrose 5%. 1000 milliLiter(s) (50 mL/Hr) IV Continuous <Continuous>  dextrose 5%. 1000 milliLiter(s) (100 mL/Hr) IV Continuous <Continuous>  dextrose 50% Injectable 25 Gram(s) IV Push once  dextrose 50% Injectable 12.5 Gram(s) IV Push once  dextrose 50% Injectable 25 Gram(s) IV Push once  enoxaparin Injectable 40 milliGRAM(s) SubCutaneous daily  glucagon  Injectable 1 milliGRAM(s) IntraMuscular once  insulin glargine Injectable (LANTUS) 5 Unit(s) SubCutaneous at bedtime  insulin lispro (ADMELOG) corrective regimen sliding scale   SubCutaneous three times a day before meals  insulin lispro (ADMELOG) corrective regimen sliding scale   SubCutaneous at bedtime  multivitamin 1 Tablet(s) Oral daily  thiamine 100 milliGRAM(s) Oral daily    MEDICATIONS  (PRN):        Objective:    Vitals: Vital Signs Last 24 Hrs  T(C): 36.8 (21 @ 08:48), Max: 37.2 (21 @ 01:20)  T(F): 98.2 (21 @ 08:48), Max: 99 (21 @ 01:20)  HR: 99 (21 @ 08:48) (92 - 111)  BP: 91/56 (21 @ 08:48) (91/56 - 137/75)  BP(mean): --  RR: 17 (21 @ 08:48) (16 - 18)  SpO2: 96% (21 @ 08:48) (96% - 100%)            I&O's Summary      PHYSICAL EXAM:  GENERAL: NAD, slight temporal wasting  HEENT: PERRL, no scleral icterus, no head and neck lymphadenopathy  CHEST/LUNG: CTAB, no increased respiratory effort  HEART: Tachycardic, no murmurs appreciated  ABDOMEN: soft, nondistended, non-tender, normoactive BS  SKIN: Dry skin behind knees, from winter rash per patient  NERVOUS SYSTEM: Alert & Oriented X3, 3/5 strength in shoulder flexion, 5/5  strength, 1/5 strength hip flexion, 4/5 knee flexion/extension, 5/5 plantarflexion/dorsiflexion, sensation intact all throughout, 2+ patellar reflexes, unable to elicit biceps or brachioradialis reflex  EXT: no peripheral edema  PSYCH: calm and cooperative     LABS:        133<L>  |  97  |  11  ----------------------------<  139<H>  4.5   |  27  |  0.53      133<L>  |  94<L>  |  16  ----------------------------<  160<H>  4.6   |  27  |  0.54    Ca    8.8      31 Aug 2021 07:07  Ca    9.2      30 Aug 2021 18:26  Phos  4.0       Mg     2.0         TPro  6.3  /  Alb  2.4<L>  /  TBili  0.2  /  DBili  x   /  AST  111<H>  /  ALT  155<H>  /  AlkPhos  73    TPro  7.3  /  Alb  3.0<L>  /  TBili  0.2  /  DBili  x   /  AST  139<H>  /  ALT  192<H>  /  AlkPhos  81  08-30    PT/INR - ( 31 Aug 2021 07:13 )   PT: 14.2 sec;   INR: 1.19 ratio         PTT - ( 31 Aug 2021 07:13 )  PTT:27.4 sec              Urinalysis Basic - ( 30 Aug 2021 22:02 )    Color: Light Yellow / Appearance: Clear / S.009 / pH: x  Gluc: x / Ketone: Negative  / Bili: Negative / Urobili: Negative   Blood: x / Protein: Trace / Nitrite: Negative   Leuk Esterase: Negative / RBC: 1 /hpf / WBC 0 /HPF   Sq Epi: x / Non Sq Epi: 0 /hpf / Bacteria: Negative                              9.7    12.97 )-----------( 369      ( 31 Aug 2021 07:08 )             30.5                         10.4   17.23 )-----------( 408      ( 30 Aug 2021 18:26 )             33.4     CAPILLARY BLOOD GLUCOSE      POCT Blood Glucose.: 131 mg/dL (31 Aug 2021 07:24)  POCT Blood Glucose.: 97 mg/dL (31 Aug 2021 00:12)          RADIOLOGY & ADDITIONAL TESTS:            Imaging Personally Reviewed:  [ ] YES  [ ] NO    Consultants involved in case:   Consultant(s) Notes Reviewed:  [ ] YES  [ ] NO:   Care Discussed with Consultants/Other Providers [ ] YES  [ ] NO         KARLA LORENZ  61y  MRN: [id number]    Subjective: Patient is a 61y old  Male who presents with a chief complaint of Myositis (31 Aug 2021 09:28)       Interval history/overnight events:  MBS with oropharyngeal dysphagia, penetration noted with all consistencies of diet.     Assessed at bedside. Reports improved strength in extremities Walking while in room. Reports some night sweats overnight. Denies fever, chills. Discussed aspiration risk, patient expressed understanding and accepted risk of continuing on diet. No other complaints at this time.    MEDICATIONS  (STANDING):  aspirin enteric coated 81 milliGRAM(s) Oral daily  dextrose 40% Gel 15 Gram(s) Oral once  dextrose 5%. 1000 milliLiter(s) (50 mL/Hr) IV Continuous <Continuous>  dextrose 5%. 1000 milliLiter(s) (100 mL/Hr) IV Continuous <Continuous>  dextrose 50% Injectable 25 Gram(s) IV Push once  dextrose 50% Injectable 12.5 Gram(s) IV Push once  dextrose 50% Injectable 25 Gram(s) IV Push once  enoxaparin Injectable 40 milliGRAM(s) SubCutaneous daily  glucagon  Injectable 1 milliGRAM(s) IntraMuscular once  insulin glargine Injectable (LANTUS) 5 Unit(s) SubCutaneous at bedtime  insulin lispro (ADMELOG) corrective regimen sliding scale   SubCutaneous three times a day before meals  insulin lispro (ADMELOG) corrective regimen sliding scale   SubCutaneous at bedtime  multivitamin 1 Tablet(s) Oral daily  thiamine 100 milliGRAM(s) Oral daily    MEDICATIONS  (PRN):        Objective:    Vitals: Vital Signs Last 24 Hrs  T(C): 36.8 (21 @ 08:48), Max: 37.2 (21 @ 01:20)  T(F): 98.2 (21 @ 08:48), Max: 99 (21 @ 01:20)  HR: 99 (21 @ 08:48) (92 - 111)  BP: 91/56 (21 @ 08:48) (91/56 - 137/75)  BP(mean): --  RR: 17 (21 @ 08:48) (16 - 18)  SpO2: 96% (21 @ 08:48) (96% - 100%)                I&O's Summary      PHYSICAL EXAM:  GENERAL: NAD, slight temporal wasting  HEENT: PERRL, no scleral icterus, no head and neck lymphadenopathy  CHEST/LUNG: CTAB, no increased respiratory effort  HEART: Tachycardic, no murmurs appreciated  ABDOMEN: soft, nondistended, non-tender, normoactive BS  SKIN: Dry skin behind knees, from winter rash per patient  NERVOUS SYSTEM: Alert & Oriented X3, 3/5 strength in shoulder flexion, 5/5  strength, 3/5 strength hip flexion, 5/5 knee flexion/extension, 5/5 plantarflexion/dorsiflexion, sensation intact all throughout, 2+ patellar reflexes, 2+ biceps and 2+ brachioradialis reflex  EXT: no peripheral edema  PSYCH: calm and cooperative     LABS:        133<L>  |  97  |  11  ----------------------------<  139<H>  4.5   |  27  |  0.53      133<L>  |  94<L>  |  16  ----------------------------<  160<H>  4.6   |  27  |  0.54    Ca    8.8      31 Aug 2021 07:07  Ca    9.2      30 Aug 2021 18:26  Phos  4.0       Mg     2.0         TPro  6.3  /  Alb  2.4<L>  /  TBili  0.2  /  DBili  x   /  AST  111<H>  /  ALT  155<H>  /  AlkPhos  73    TPro  7.3  /  Alb  3.0<L>  /  TBili  0.2  /  DBili  x   /  AST  139<H>  /  ALT  192<H>  /  AlkPhos  81  08-30    PT/INR - ( 31 Aug 2021 07:13 )   PT: 14.2 sec;   INR: 1.19 ratio         PTT - ( 31 Aug 2021 07:13 )  PTT:27.4 sec              Urinalysis Basic - ( 30 Aug 2021 22:02 )    Color: Light Yellow / Appearance: Clear / S.009 / pH: x  Gluc: x / Ketone: Negative  / Bili: Negative / Urobili: Negative   Blood: x / Protein: Trace / Nitrite: Negative   Leuk Esterase: Negative / RBC: 1 /hpf / WBC 0 /HPF   Sq Epi: x / Non Sq Epi: 0 /hpf / Bacteria: Negative                              9.7    12.97 )-----------( 369      ( 31 Aug 2021 07:08 )             30.5                         10.4   17.23 )-----------( 408      ( 30 Aug 2021 18:26 )             33.4     CAPILLARY BLOOD GLUCOSE      POCT Blood Glucose.: 131 mg/dL (31 Aug 2021 07:24)  POCT Blood Glucose.: 97 mg/dL (31 Aug 2021 00:12)          RADIOLOGY & ADDITIONAL TESTS:            Imaging Personally Reviewed:  [ ] YES  [ ] NO    Consultants involved in case:   Consultant(s) Notes Reviewed:  [ ] YES  [ ] NO:   Care Discussed with Consultants/Other Providers [ ] YES  [ ] NO

## 2021-09-02 NOTE — DIETITIAN INITIAL EVALUATION ADULT. - PROBLEM SELECTOR PLAN 4
To solids and liquids and without significant symptoms of dyspepsia but with occasional regurgitation. No issue with initiating swallow but does have cough. favor esophageal > oropharyngeal dysphagia. s/p normal outpatient EGD.  Failed bedside swallow eval.   suspect neuromuscular disease but will need barium swallow first (unless patient has already done it - couldn't find in records).  -will also email GI for input on this as well as colonic thickening  -could consider ENT consult if other workup is negative  - NPO until speech/swallow eval

## 2021-09-02 NOTE — SWALLOW VFSS/MBS ASSESSMENT ADULT - H & P REVIEW
PMHx: DM, w/ hx of prior recent admissions to OSH with TIJERINA, Troponin I 0.4, Sinus Tach, CPK 4000, LDH, aldolase elevated, who presents to the [facility] with subacute weakness, weight loss, dysphagia. Admitted for troponemia. Pt reports gradual onset of dysphagia starting in 4/2021 that was to both solids and liquids (reports getting pfizer series in 3/2021). States he could only tolerate small sips of water or bites of food and that he has intermittent globus sensation that when present makes it impossible to eat or drink anything resulting in choking/coughing/regurgitation. Around the same time, he reports progressive SOB and fatigue and a ~40lb weight loss.

## 2021-09-02 NOTE — PROGRESS NOTE ADULT - PROBLEM SELECTOR PLAN 5
Reports taking 10-12 U of daily insulin as well as janumet. reports that A1c had been poorly controlled in the past. Most recent on outpatient paperwork was 8.1. it is possible that patient's dysphagia is related to gastroparesis although less likely.  - A1C 7.2  -lantus 5 qhs  -ISS Reports taking 10-12 U of daily insulin as well as janumet. reports that A1c had been poorly controlled in the past. Most recent on outpatient paperwork was 8.1. it is possible that patient's dysphagia is related to gastroparesis although less likely.  - A1C 7.2  -lantus 8 qhs, ademlog 4 mealtime  -ISS

## 2021-09-02 NOTE — DIETITIAN INITIAL EVALUATION ADULT. - PROBLEM SELECTOR PLAN 6
Had colonic thickening on CT at OSH. planned for colonoscopy but held off due to elevated troponins and request for LHC prior to C-scope. per report, the thickening could have been due to underdistension.  -given current differential patient would likely benefit from malignancy workup anyway  -would obtain CT chest/abdo/pelvis with contrast as malignancy is both on the differential as well as a cause of paraneoplastic myositis.  -GI consult

## 2021-09-02 NOTE — CHART NOTE - NSCHARTNOTEFT_GEN_A_CORE
Patient Dexter Monroy requires medical clearance for upcoming muscle biopsy with general surgery.     RCRI score of 1 (due to pre-operative insulin) for pre-operative risk. Patient is medically cleared for muscle biopsy procedure. Cardiac clearance noted in vascular cardiology note.     Víctor Veras, PGY-1 Patient Dexter Monroy requires medical clearance for upcoming muscle biopsy with general surgery.     RCRI score of 1 (due to pre-operative insulin) for pre-operative risk. Patient is medically cleared for muscle biopsy procedure. Cardiac clearance noted in vascular cardiology note. Ok from our perspective to continue aspirin 79 ayo-operatively.     Víctor Veras, PGY-1

## 2021-09-02 NOTE — PROGRESS NOTE ADULT - ASSESSMENT
Assessment:  1. Troponin T Elevated      No active C/P or SOB      Cardiac Cath 9/1: non-obstructive CAD  2. Myositis / CPK Elevated / Muscular Atrophy and Weakness  3. Dysphagia  4. Weight Loss   5. DM  6. Colon Thickening on prior CT  7. Anemia / Iron Deficiency Anemia     Plan:  1. Recommend Cardiac MRI to rule out myocarditis.     Troponin T was elevated, much higher than during prior admissions.     Cardiac cath w/ non-obstructive CAD, he also did have the COVID vaccine this year.     Troponin and CPK now down-trending after steroids started.   2. Continue ASA for medical management of non-obstructive CAD.   3. No further cardiac or vascular testing is needed prior to muscle biopsy or colonoscopy.  4. While NPO, he should have continuous IV fluids due to the mid cavitary gradients on Echo.      Oral hydration encouraged in general.   5. Anemia evaluation and Iron Deficiency repletion as per Primary Team and GI.  6. Statin on hold due to elevated CPK and concern for myositis.      Rheumatology consultation appreciated.  7. Dysphagia evaluation by GI / ENT / Speech and Swallow appreciated.  8. Recommend PSA and age appropriate CA screening with a colonoscopy.  9. Discussed with Primary Team.    Thank you  JUAN Witt, MPAS  Vascular Cardiology Service    Please call with any questions:   Service Line: [phone]  Office [phone]

## 2021-09-02 NOTE — PHYSICAL THERAPY INITIAL EVALUATION ADULT - PERTINENT HX OF CURRENT PROBLEM, REHAB EVAL
61 year old man with PMH DM2 presents with 5 month history of weight loss, proximal muscle weakness, likely neuromuscular dysphagia found to have very elevated troponin levels and normal EKG. Recent Echo and EGD mostly within normal limits. Outpatient labs with elevations in CK, troponin, aldolase, and CRP but normal TSH, SAMIA, antibodies for smith, RNP, centromere, scleroderma, and dsDNA

## 2021-09-02 NOTE — DIETITIAN INITIAL EVALUATION ADULT. - ORAL INTAKE PTA/DIET HISTORY
Per chart: "Patient reports gradual onset of dysphagia starting in 4/2021 that was to both solids and liquids. He reports that he could only tolerate very small sips of water or bites of food and that he has an intermittent sensation of lump in his throat that when present makes it impossible to eat or drink anything resulting in choking/coughing/regurgitation." Pt denies vitamin/mineral supplementation PTA.

## 2021-09-02 NOTE — DIETITIAN INITIAL EVALUATION ADULT. - PROBLEM SELECTOR PLAN 1
troponin elevated on admission with subsequent downtrend 1022->991. EKg with sinus tachycardia and without evidence of ischemia. patient denies exertional (or any) chest pain dyspnea, orthopnea. BNP wnl. recent echo with LVEF 70%, normal RVSF, and no valvular abnormalities per discharge paperwork. Very low suspicion for ACS.  -suspect elevation in the setting of myositis vs myocarditis  -appreciate vascular cardiology recs  -repeat EKG and troponin if active chest pain develops  -no need for ACS protocol  -c/w low dose aspirin  -hold statins in the setting of myopathy and elevated ast/alt  -echocardiogram for assessment of possible infiltrative disease or cardiomyopathy  -c/w telemetry monitoring

## 2021-09-02 NOTE — CHART NOTE - NSCHARTNOTEFT_GEN_A_CORE
Spoke with Speech&Swallow about results from modified barium swallow. Aspiration was noted during the study with both solid and liquids. No recommendations for diet because patient would have equal aspiration risk with any type of diet, per S&S. Study results described aspirates overlying vocal cords. S&S discussed results with patient and offered alternative means of feeding. Patient refused. I spoke with patient and offered alternative feeding methods as well, including nasogastric tube and PEG tube. Patient refused both interventions. He understood and accepted risk of aspiration leading to pneumonia. His rationale was that he has not had an issue thus far, so would like to continue with eating a regular diet. We will continue him on a regular diet. S&S will continue to follow and provide supportive exercises for patient. Please reach out with any further questions.    Víctor Veras, PGY-1  [phone]

## 2021-09-02 NOTE — SWALLOW VFSS/MBS ASSESSMENT ADULT - LARYNGEAL PENETRATION DURING THE SWALLOW - SILENT
over the arytenoids; to the vocal folds; +intermittent throat clearing not always effective in clearing material from airway/Trace +laryngeal penetration of pharyngeal residue on repeat swallows; +intermittent throat clearing which is not always effective in eliminating material from airway/Trace

## 2021-09-02 NOTE — SWALLOW VFSS/MBS ASSESSMENT ADULT - DIAGNOSTIC IMPRESSIONS
Patient seen for MBS to r/o dysphagia given c/o pharyngeal retention and s/s of pharyngeal dysphagia at bedside. Patient presents with an oropharyngeal dysphagia characterized by reduced AP transport of solids resulting in oral residue, significant post swallow pharyngeal residue across all consistencies, laryngeal penetration to the level of the vocal folds during the swallow with thick puree, nectar thick liquids, and honey thick liquids, and aspiration during the swallow with thin liquids. Reflexive cough/throat clearing is ineffective in clearing penetrated/aspirated material from the airway. Multiple swallows and liquid wash are mildly effective in reducing pharyngeal residue post swallow. There is evidence of retrograde flow of material from the esophagus to the pyriform sinus resulting in increased pharyngeal residue which eventually enters the airway on subsequent swallows.    Disorders: reduced lingual strength/ROM/Rate of motion, reduced BOT to posterior pharyngeal wall contact, reduced hyo-laryngeal elevation/excursion, reduced UES opening. Patient seen for MBS to r/o dysphagia given c/o pharyngeal retention and s/s of pharyngeal dysphagia at bedside. Patient presents with an oropharyngeal dysphagia characterized by reduced AP transport of solids resulting in oral residue, significant post swallow pharyngeal residue across all consistencies, laryngeal penetration to the level of the vocal folds during the swallow with thick puree, nectar thick liquids, and honey thick liquids, and aspiration during the swallow with thin liquids. Reflexive cough/throat clearing is ineffective in clearing penetrated/aspirated material from the airway. Multiple swallows and liquid wash are mildly effective in reducing pharyngeal residue post swallow. There is evidence of retrograde flow of material from the esophagus to the pyriform sinus resulting in increased pharyngeal residue which eventually enters the airway on subsequent swallows. A chin tuck is ineffective in maintaining airway protection.    Disorders: reduced lingual strength/ROM/Rate of motion, reduced BOT to posterior pharyngeal wall contact, reduced hyo-laryngeal elevation/excursion, reduced UES opening.

## 2021-09-02 NOTE — SWALLOW VFSS/MBS ASSESSMENT ADULT - SLP GENERAL OBSERVATIONS
Patient received awake and alert, upright in DENIS chair, on RA, A&Ox4. Able to follow commands and make wants/needs known.

## 2021-09-02 NOTE — DIETITIAN NUTRITION RISK NOTIFICATION - TREATMENT: THE FOLLOWING DIET HAS BEEN RECOMMENDED
Diet, Regular:   Consistent Carbohydrate {No Snacks} (CSTCHO) (09-01-21 @ 12:44) [Active]    RD to provide diet Mighty Shake 3x/day (200 kcal, 7 gm protein in each) to optimize intake

## 2021-09-02 NOTE — DIETITIAN INITIAL EVALUATION ADULT. - ADD RECOMMEND
Pt at RISK FOR REFEEDING SYNDROME: continue multivitamin and thiamine as medically feasible. Monitor PO intake, weight, labs, skin, GI status, diet. Malnutrition sticker placed, RD to follow-up as per protocol.

## 2021-09-02 NOTE — CONSULT NOTE ADULT - ASSESSMENT
ASSESSMENT: Patient is a 61y old m with DM2, p/w subacute proximal muscle weakness, weight loss, dysphagia and troponemia. Concern for polymyositis and cardiomyositis. Surgery consulted for muscle biopsy.    PLAN:   - Surgery team to perform muscle biopsy in OR, left thigh per MRI read  - Tomorrow vs. Monday depending on OR/surgeon availability  - Cardiology clearance noted, please document medical risk stratification/optimization  - Please send preop labs - Type and screen within 3 days, cbc, bmp with mg and ph in the am, CXR and EKG this admission  - NPO at midnight when date confirmed    - Patient discussed with attending Dr. Wei

## 2021-09-02 NOTE — PHYSICAL THERAPY INITIAL EVALUATION ADULT - ASR WT BEARING STATUS EVAL
9/1: MRI left leg preparation for muscle biopsy with rheumatology, plan for cardiac MRI to invetigate myocarditis, regular diet per speech&swallow, who consulted ENT. ENT thinks dysphagia likely due to laryngopharyngeal reflux./no weight-bearing restrictions

## 2021-09-02 NOTE — SWALLOW VFSS/MBS ASSESSMENT ADULT - SLP PERTINENT HISTORY OF CURRENT PROBLEM
Pt w/ gradual onset of symmetric proximal muscle weakness associated with enzyme elevations indicative of muscle injury due to inflammation concerning for myositis/ inflammatory myopathy. Unclear underlying etiology. Denies rash, joint pain, fever/chills. W/o dermatologic manifestations. DDX includes Primary polymyositis, immune-mediated necrotizing myopathy, anti-synthetase, and inclusion body myositis. W/o rash dermatomyositis appears less likely. Concern also for underlying paraneoplastic syndrome or other infiltrative disease such as amyloidosis. Less likely to be iatrogenic due to medication as no offending medication (statin or steroids most likely) noted. No clear underlying causea, outpt TSH wnl. Otherwise Ca wnl, less likely parathyroid. Can consider cortisol levels if other labs unrevealing. F/up Vit D. Pending Rheum and GI consult. Swallow eval and barium swallow, EGD at outside hospital normal; consider ENT eval.

## 2021-09-02 NOTE — PROGRESS NOTE ADULT - ASSESSMENT
62 yo M with PMHx of Diabetes and short term statin use presents with subacute progressively worsening proximal muscle weakness, dysphagia and 35 lb weight loss x 5 month. Physical exam significant for proximal weakness, no skin rash. CK in 3000s range.      # inflammatory myopathy  - differential polymyositis, dermatomyositis (lack of skin findings does not support this), inclusion body myositis (usually a distal myositis), statin induced myopathy (unlikely as he hasn't been on a statin in a few years) vs. necrotizing autoimmune myopathy  - Myomarker panel and HMG-CoA Reductase antibody pending    - anti-Natasha-1 negative   - hep panel negative, Quanti-gold negative   - MRI completed on 9/1- showed b/l diffuse muscle edema and enhancement, pending muscle bx next week    - continue Solumedrol 500mg IV x 3 days (9/1- 9/3), mild improvement in muscle strength today, continue GI prophylaxis and PCP prophylaxis    - failed MBS and continue to endorse dysphagia  - will start IVIG today x5 days (9/2-9/6),  please give IV Benadryl and Tylenol before infusion. IV hydrocortisone ordered PRN if allergic reaction.    - CT c/a/p: negative for intrathoracic and intra-abd malignancy    - iron deficiency anemia, please replete iron, never had colonoscopy before     - age appropriate malignancy screen in outpt setting      Varinder Knapp, PGY-4  Rheumatology Fellow   Pager: [phone] 62 yo M with PMHx of Diabetes and short term statin use presents with subacute progressively worsening proximal muscle weakness, dysphagia and 35 lb weight loss x 5 month. Physical exam significant for proximal weakness, no skin rash. CK in 3000s range.      # inflammatory myopathy  - differential polymyositis, dermatomyositis (lack of skin findings does not support this), inclusion body myositis (usually a distal myositis), statin induced myopathy (unlikely as he hasn't been on a statin in a few years) vs. necrotizing autoimmune myopathy  - Myomarker panel and HMG-CoA Reductase antibody pending    - anti-Natasha-1 negative   - hep panel negative, Quanti-gold negative   - MRI completed on 9/1- showed b/l diffuse muscle edema and enhancement, pending muscle bx next week    - continue Solumedrol 500mg IV x 3 days (9/1- 9/3), mild improvement in muscle strength today, continue GI prophylaxis and PCP prophylaxis    - failed MBS and continue to endorse dysphagia  - will start IVIG today x5 days (9/2-9/6),  please give IV Benadryl and Tylenol before infusion. IV hydrocortisone ordered PRN if allergic reaction.    - CT c/a/p: negative for intrathoracic and intra-abd malignancy    - iron deficiency anemia, please replete iron, never had colonoscopy before     - age appropriate malignancy screen in outpt setting    - will also need DEXA scan outpt.     Varinder Knapp, PGY-4  Rheumatology Fellow   Pager: [phone]

## 2021-09-02 NOTE — DIETITIAN INITIAL EVALUATION ADULT. - LAB (SPECIFY)
Pt at RISK for REFEEDING SYNDROME: monitor/replete electrolytes PRN. Continue to monitor and trend glucose fingersticks.

## 2021-09-02 NOTE — SWALLOW VFSS/MBS ASSESSMENT ADULT - ADDITIONAL RECOMMENDATIONS
Maintain good oral hygiene.  Trial of restorative swallow therapy as schedule permits and post d/c in rehab setting.

## 2021-09-02 NOTE — SWALLOW VFSS/MBS ASSESSMENT ADULT - ROSENBEK'S PENETRATION ASPIRATION SCALE
Writer spoke with patient, appointment scheduled for 9/24/19@0800 to discuss Prostate US and Biopsy.    (5) contrast contacts vocal cords, visible residue remains (penetration) (1) no aspiration, contrast does not enter airway (7) contrast passes glottis, visible subglottic residue remains despite patient’s response (aspiration)

## 2021-09-02 NOTE — DIETITIAN INITIAL EVALUATION ADULT. - OTHER INFO
Pt with minimal interest in RD interview at this time. Limited information obtained. Pt reports good appetite and PO intake in-house (diet advanced 8/31). Pt with Pentecostal/cultural/personal food preferences - obtained, will honor as able to encourage intake (pt requesting Kosher food). Pt denies nausea/vomiting/diarrhea/constipation. Last BM yesterday. Pt denies difficulties chewing foods. S/p Swallow Bedside Assessment yesterday 9/1; recommended Regular diet, MBS.     Pt reports weight loss starting "end of March, beginning of April" 2021, reports weighing ~178 pounds at that time. Dosing weight 132.9 pounds suggests ~25% wt loss x <1 year - clinically significant. Unable to obtain new wt at time of visit - pt in chair. Will continue to monitor and trend weights.    Pt reports checking glucose fingersticks PTA with values 100-120 mg/dL. Reports taking insulin and oral medication for DM control at home. Most recent HbA1c 7.2% suggests fair glycemic control.     Diet education deferred at this time - pt not interested and RN coming in to give medications. Food preferences obtained - will honor as able to encourage intake. Pt made aware RD remains available.

## 2021-09-02 NOTE — PROGRESS NOTE ADULT - PROBLEM SELECTOR PLAN 3
Normal MCV. Suspect AoCD.  - Hgb stable 10.2 from 9.7  - LDH elevated, haptoglobin and bili wnl  - b12/folate wnl  - iron 8% sat, serum iron 13, TIBC decreased, ferritin elevated 704 from 538  - continue to monitor Normal MCV. Suspect AoCD.  - Hgb stable 11.1 from 10.2  - LDH elevated, haptoglobin and bili wnl  - b12/folate wnl  - iron 8% sat, serum iron 13, TIBC decreased, ferritin elevated 704 from 538  - continue to monitor

## 2021-09-02 NOTE — CONSULT NOTE ADULT - CONSULT REASON
Elevated Troponin
Muscle Biopsy
Dysphagia and colonic thickening
Myositis evaluation
Upper airway evaluation

## 2021-09-02 NOTE — CONSULT NOTE ADULT - SUBJECTIVE AND OBJECTIVE BOX
GENERAL SURGERY CONSULT NOTE [id number]  --------------------------------------------------------------------------------------------    HPI:   Patient is a 61y old  Male who presents with a chief complaint of Myositis (02 Sep 2021 09:43)    HPI:  61 year old man with PMH DM2 presents to the hospital with subacute weakness, weight loss, dysphagia and admitted for troponemia. Patient reports gradual onset of dysphagia starting in 2021 (reports getting pfizer series in 3/2021) that was to both solids and liquids. He reports that he could only tolerate very small sips of water or bites of food and that he has an intermittent sensation of lump in his throat that when present makes it impossible to eat or drink anything resulting in choking/coughing/regurgitation. Around the same time he reports progressive SOB and fatigue and a ~40lb weight loss. he reports weakness of his biceps and legs and has difficulty standing from a seated position as well as reaching his arms over his head. He also has a bruise on his R bicep from when someone lightly squeezed it that has grown very large. He was admitted several times to [facility] with extensive workup. He had labs showing elevation in CK, CRP, aldolase, troponin, CKMB, AST/ALT but negative SAMIA, C3/C4, anti-RNP, smith, centromere, scleroderma, and dsDNA (anti-Mi2 and anti-Natasha were pending). He had an echocardiogram with LVEF 70%, normal RCSF, and no valvular disorders. He had CTA chest which showed only 6mm calcified granuloma in RLL and some calcified mediastinal LNs - ACE level and Quant Gold were pending. He had abdominal CT with thickening of colon of hepatic flexure (concerning for underdistension) but he was not able to get colonoscopy. He had MRI of L spine with mild L4-L5 and L5-S1 disease. He also had an outpatient EGD which was normal except for mild inflammatory changes and erythematous mucosa without evidence of malignancy or H pylori. today he denies fevers, chills, chest pain (at rest, with exertion, and with changes in postition), SOB at rest, palpitations, abdominal pain, N/V (in the absence of eating), diarrhea, dysuria, heartburn, decreased  strength, joint pain.    In the ED vitals were stable except for mild tachycardia. patient received 1L LR. (31 Aug 2021 00:03)      Surgery consulted fo muscle biopsy for further workup of muscle weakness, possible myositis. The patient explains that both arms and legs are equally weak. His swallowing has improved a little. He was started on solumedrol on  but has not felt any better from them yet. He is amenable to surgery.    Patient denies fevers/chills, denies lightheadedness/dizziness, denies SOB/chest pain, denies nausea/vomiting.  Has muscle weakness in arms and legs.    ROS: 10-system review is otherwise negative except HPI above.      PAST MEDICAL & SURGICAL HISTORY:  Type 2 diabetes mellitus    History of esophagogastroduodenoscopy (EGD)      FAMILY HISTORY:  FH: leukemia (Father)    [] Family history not pertinent as reviewed with the patient and family    SOCIAL HISTORY:    Never smoker, never drinker    ALLERGIES: No Known Allergies      HOME MEDICATIONS:   Home Medications:  Aspir 81 oral delayed release tablet: 1 tab(s) orally once a day (31 Aug 2021 00:19)  insulin detemir 100 units/mL subcutaneous solution: 10 unit(s) subcutaneous once a day (at bedtime) (31 Aug 2021 00:19)  Janumet 50 mg-500 mg oral tablet: 1 tab(s) orally 2 times a day (31 Aug 2021 00:19)      CURRENT MEDICATIONS  MEDICATIONS (STANDING): aspirin enteric coated 81 milliGRAM(s) Oral daily  dextrose 40% Gel 15 Gram(s) Oral once  dextrose 5%. 1000 milliLiter(s) IV Continuous <Continuous>  dextrose 5%. 1000 milliLiter(s) IV Continuous <Continuous>  dextrose 50% Injectable 25 Gram(s) IV Push once  dextrose 50% Injectable 12.5 Gram(s) IV Push once  dextrose 50% Injectable 25 Gram(s) IV Push once  glucagon  Injectable 1 milliGRAM(s) IntraMuscular once  insulin glargine Injectable (LANTUS) 5 Unit(s) SubCutaneous at bedtime  insulin lispro (ADMELOG) corrective regimen sliding scale   SubCutaneous three times a day before meals  insulin lispro (ADMELOG) corrective regimen sliding scale   SubCutaneous at bedtime  lactated ringers. 1000 milliLiter(s) IV Continuous <Continuous>  methylPREDNISolone sodium succinate IVPB 500 milliGRAM(s) IV Intermittent daily  multivitamin 1 Tablet(s) Oral daily  pantoprazole    Tablet 40 milliGRAM(s) Oral before breakfast  thiamine 100 milliGRAM(s) Oral daily  trimethoprim  160 mG/sulfamethoxazole 800 mG 1 Tablet(s) Oral daily    MEDICATIONS (PRN):  --------------------------------------------------------------------------------------------    Vitals:   T(C): 36.3 (21 @ 05:17), Max: 37.2 (21 @ 20:07)  HR: 83 (21 @ 05:17) (83 - 105)  BP: 101/66 (21 @ 05:17) (101/66 - 118/72)  RR: 16 (21 @ 05:17) (16 - 18)  SpO2: 97% (21 @ 05:17) (95% - 97%)  CAPILLARY BLOOD GLUCOSE      POCT Blood Glucose.: 252 mg/dL (02 Sep 2021 08:03)  POCT Blood Glucose.: 160 mg/dL (01 Sep 2021 21:01)  POCT Blood Glucose.: 116 mg/dL (01 Sep 2021 16:58)  POCT Blood Glucose.: 178 mg/dL (01 Sep 2021 11:52)       @ 07:01  -   @ 07:00  --------------------------------------------------------  IN:    dextrose 5% + lactated ringers w/ Additives: 350 mL    dextrose 5% + lactated ringers w/ Additives: 70 mL    Lactated Ringers: 150 mL    Oral Fluid: 120 mL  Total IN: 690 mL    OUT:    Voided (mL): 450 mL  Total OUT: 450 mL    Total NET: 240 mL        Height (cm): 172.7 ( @ 14:18)  Weight (kg): 60.3 ( @ 14:18)  BMI (kg/m2): 20.2 ( @ 14:18)  BSA (m2): 1.72 ( @ 14:18)    PHYSICAL EXAM:  General: NAD, Lying in bed comfortably  Neuro: A+Ox3  HEENT: NC/AT, EOMI  Cardio: Mildly tachycardic  Resp: Good effort  GI/Abd: Soft, nondistended  Skin: Intact, no breakdown  Musculoskeletal: All 4 extremities with 3/5 strength proximally with limited rom due to weakness  --------------------------------------------------------------------------------------------    LABS  CBC ( @ 06:19)                              10.2<L>                         15.03<H>  )----------------(  387        83.4<H>% Neutrophils, 12.3<L>% Lymphocytes, ANC: 12.55<H>                              33.0<L>    BMP ( @ 07:57)             135     |  96      |  13    		Ca++ --      Ca 9.7                ---------------------------------( 138<H>		Mg 2.0                4.3     |  22      |  0.53  			Ph 4.4       LFTs ( @ 07:57)      TPro 7.5 / Alb 2.8<L> / TBili 0.4 / DBili -- / <H> / <H> / AlkPhos 89      Cardiac Markers ( @ 07:57)     HSTrop: -- / CKMB: -- / CK: 3244        --------------------------------------------------------------------------------------------    MICROBIOLOGY  Urinalysis ( @ 22:02):     Color: Light Yellow / Appearance: Clear / S.009<L> / pH: 6.0 / Gluc: Negative / Ketones: Negative / Bili: Negative / Urobili: Negative / Protein :Trace<!> / Nitrites: Negative / Leuk.Est: Negative / RBC: 1 / WBC: 0 / Sq Epi:  / Non Sq Epi: 0 / Bacteria Negative         --------------------------------------------------------------------------------------------    IMAGING  ***    --------------------------------------------------------------------------------------------

## 2021-09-02 NOTE — PROGRESS NOTE ADULT - SUBJECTIVE AND OBJECTIVE BOX
Vascular Cardiology Consult Note    SERVICE CONSULT: [phone]        OFFICE [phone]    CC: TIJERINA / Muscle Weakness / Dysphagia    Interval Events:  No C/P or SOB.  Started on IV steroids.  Troponin T and CPK now down-trending.   No LE pain or swelling.    Allergies  No Known Allergies    MEDICATIONS  (STANDING):  aspirin enteric coated 81 milliGRAM(s) Oral daily  glucagon  Injectable 1 milliGRAM(s) IntraMuscular once  insulin glargine Injectable (LANTUS) 5 Unit(s) SubCutaneous at bedtime  insulin lispro (ADMELOG) corrective regimen sliding scale   SubCutaneous three times a day before meals  insulin lispro (ADMELOG) corrective regimen sliding scale   SubCutaneous at bedtime  lactated ringers. 1000 milliLiter(s) (75 mL/Hr) IV Continuous <Continuous>  magnesium sulfate  IVPB 2 Gram(s) IV Intermittent once  methylPREDNISolone sodium succinate IVPB 500 milliGRAM(s) IV Intermittent daily  multivitamin 1 Tablet(s) Oral daily  pantoprazole    Tablet 40 milliGRAM(s) Oral before breakfast  thiamine 100 milliGRAM(s) Oral daily  trimethoprim  160 mG/sulfamethoxazole 800 mG 1 Tablet(s) Oral daily    PAST MEDICAL & SURGICAL HISTORY:  Type 2 diabetes mellitus  History of esophagogastroduodenoscopy (EGD)    FAMILY HISTORY:  FH: leukemia (Father)    SOCIAL HISTORY:  unchanged    REVIEW OF SYSTEMS:  CONSTITUTIONAL: No fever  EYES: No eye pain  ENT:  No throat pain  NECK: No pain  RESPIRATORY: TIJERINA  CARDIOVASCULAR: No C/P   GASTROINTESTINAL: No abdominal pain  GENITOURINARY: No hematuria  NEUROLOGICAL: loss of strength  SKIN: rash to L UE  LYMPH Nodes: No enlarged glands noted  ENDOCRINE: No heat or cold intolerance noted  MUSCULOSKELETAL: No swelling or extremity pain  PSYCHIATRIC: No depression, anxiety  HEME/LYMPH: No  bleeding gums  ALLERGY AND IMMUNOLOGIC: No hives    [ x] All others negative	    PHYSICAL EXAM:  Vital Signs Last 24 Hrs  T(C): 36.4 (02 Sep 2021 11:16), Max: 37.2 (01 Sep 2021 20:07)  T(F): 97.5 (02 Sep 2021 11:16), Max: 98.9 (01 Sep 2021 20:07)  HR: 93 (02 Sep 2021 11:16) (83 - 105)  BP: 104/64 (02 Sep 2021 11:16) (101/66 - 118/72)  RR: 17 (02 Sep 2021 11:16) (16 - 18)  SpO2: 98% (02 Sep 2021 11:16) (95% - 98%)    Appearance:  	  HEENT:  NC/AT  Cardiovascular: RRR, S1 and S2   Respiratory: CTA B/L  Psychiatry:  AAO x 3  Gastrointestinal:  Soft, Non-tender, + BS	  Skin: No ecchymoses, No cyanosis	  Neurologic: No focal deficits noted  Extremities: No LE edema, B/L calves soft  Foot Exam: No tissue loss or ulceration  R Radial Site: C/D/I, Radial Pulse 2+    LABS:	 	                        11.1   10.43 )-----------( 425      ( 02 Sep 2021 10:31 )             35.4     09-02    134<L>  |  94<L>  |  21  ----------------------------<  318<H>  4.6   |  23  |  0.47<L>    Ca    8.8      02 Sep 2021 10:31  Phos  5.2     09-02  Mg     1.8     09-02    TPro  7.2  /  Alb  3.0<L>  /  TBili  0.2  /  DBili  x   /  AST  109<H>  /  ALT  179<H>  /  AlkPhos  81  09-02    PT/INR - ( 02 Sep 2021 10:31 )   PT: 14.3 sec;   INR: 1.20 ratio    PTT - ( 02 Sep 2021 10:31 )  PTT:30.4 sec    CARDIAC MARKERS ( 02 Sep 2021 10:31 )  x     / x     / 2738 U/L / x     / 271.6 ng/mL  CARDIAC MARKERS ( 01 Sep 2021 07:57 )  x     / x     / 3244 U/L / x     / 206.6 ng/mL

## 2021-09-02 NOTE — DIETITIAN INITIAL EVALUATION ADULT. - CHIEF COMPLAINT
troponemia; "likely neuromuscular dysphagia"  "Overall picture favors inflammatory myopathy; without obvious rash favor polymyositis vs inclusion body myositis vs necrotizing autoimmune myositis. Also on the differential are malignancy, HIV, amyloidosis, TB, or chronic infection."

## 2021-09-02 NOTE — SWALLOW VFSS/MBS ASSESSMENT ADULT - COMMENTS
Per H&P- No issue with initiating swallow but does have cough. Favor esophageal > oropharyngeal dysphagia. s/p normal outpatient EGD. Failed dysphagia screen 8/31. Suspect neuromuscular disease but will need barium swallow first.     8/30 Vascular Cardiology- Pt with Colon thickening and RLL granuloma on CT Chest/Abd/Pelvis. Patient was referred for LHC prior to colonoscopy. EKG not in chart, reportedly without ST changes per HPI. Echo pending. Plan for cardiac cath today.    8/31 Esophagram- Preliminary  radiograph of the partially visualized chest is unremarkable. The patient swallowed barium without difficulty. The esophagus demonstrates normal distensibility, contour and course. There is no abnormal extrinsic mass effect. Contrast passes freely into the stomach. No gastroesophageal reflux was demonstrated during this examination.    8/31: SLP bedside swallow evaluation deferred due to Pt NPO for LHC. Cardiac Cath showed non-obstructive coronary artery disease.   Rheum- Primary diagnosis favors a myositis with pharyngeal weakness to explain the dysphagia. Differential of myositis includes polymyositis, dermatomyositis (lack of skin findings does not support this), inclusion body myositis (usually a distal myositis), statin induced myopathy (unlikely as he hasn't been on a statin in a few years) vs. necrotizing autoimmune myopathy. Also to be considered are infection and malignancy.     **Not previously known to this service.

## 2021-09-02 NOTE — SWALLOW VFSS/MBS ASSESSMENT ADULT - PHARYNGEAL PHASE COMMENTS
pt requires multiple repeat swallows and liquid wash to mildly reduce residue pharyngeal residue mild reduced with multiple swallows and liquid wash

## 2021-09-02 NOTE — PROGRESS NOTE ADULT - SUBJECTIVE AND OBJECTIVE BOX
KARLA LORENZ  [id number]    INTERVAL HPI/ OVERNIGHT EVENTS: s/p Solumedrol 500mg yesterday and this morning, feels about the same, pending muscle bx.     PMHx/PSHx/FamHx/SocHx reviewed and no significant changes    REVIEW OF SYSTEMS   - reviewed with patient and  negative other than as above or previously documented.     MEDICATIONS  (STANDING):  aspirin enteric coated 81 milliGRAM(s) Oral daily  dextrose 40% Gel 15 Gram(s) Oral once  dextrose 5%. 1000 milliLiter(s) (100 mL/Hr) IV Continuous <Continuous>  dextrose 5%. 1000 milliLiter(s) (50 mL/Hr) IV Continuous <Continuous>  dextrose 50% Injectable 25 Gram(s) IV Push once  dextrose 50% Injectable 12.5 Gram(s) IV Push once  dextrose 50% Injectable 25 Gram(s) IV Push once  glucagon  Injectable 1 milliGRAM(s) IntraMuscular once  insulin glargine Injectable (LANTUS) 5 Unit(s) SubCutaneous at bedtime  insulin lispro (ADMELOG) corrective regimen sliding scale   SubCutaneous three times a day before meals  insulin lispro (ADMELOG) corrective regimen sliding scale   SubCutaneous at bedtime  insulin lispro Injectable (ADMELOG) 4 Unit(s) SubCutaneous three times a day before meals  lactated ringers. 1000 milliLiter(s) (75 mL/Hr) IV Continuous <Continuous>  magnesium sulfate  IVPB 2 Gram(s) IV Intermittent once  methylPREDNISolone sodium succinate IVPB 500 milliGRAM(s) IV Intermittent daily  multivitamin 1 Tablet(s) Oral daily  pantoprazole    Tablet 40 milliGRAM(s) Oral before breakfast  thiamine 100 milliGRAM(s) Oral daily  trimethoprim  160 mG/sulfamethoxazole 800 mG 1 Tablet(s) Oral daily    MEDICATIONS  (PRN):      Allergies    No Known Allergies    Intolerances          Vital Signs Last 24 Hrs  T(C): 36.4 (02 Sep 2021 11:16), Max: 37.2 (01 Sep 2021 20:07)  T(F): 97.5 (02 Sep 2021 11:16), Max: 98.9 (01 Sep 2021 20:07)  HR: 93 (02 Sep 2021 11:16) (83 - 105)  BP: 104/64 (02 Sep 2021 11:16) (101/66 - 118/72)  BP(mean): --  RR: 17 (02 Sep 2021 11:16) (16 - 18)  SpO2: 98% (02 Sep 2021 11:16) (95% - 98%)    Physical Exam:  General: NAD, thin   HEENT: EOMI, MMM  Cardio: +S1/S2, RRR  Resp: CTA b/l  GI: +BS, soft, NT/ND  MSK: no synovitis, NTP   muscle strength:    shoulder: unable to perform   bicep/triceps: 2/5 b/l   finger/: 5/5 b/l   hip flexion: 2/5   Neuro: AAOx3  Psych: wnl      LABS:                        11.1   10.43 )-----------( 425      ( 02 Sep 2021 10:31 )             35.4     09-02    134<L>  |  94<L>  |  21  ----------------------------<  318<H>  4.6   |  23  |  0.47<L>    Ca    8.8      02 Sep 2021 10:31  Phos  5.2     09-02  Mg     1.8     09-02    TPro  7.2  /  Alb  3.0<L>  /  TBili  0.2  /  DBili  x   /  AST  109<H>  /  ALT  179<H>  /  AlkPhos  81  09-02    PT/INR - ( 02 Sep 2021 10:31 )   PT: 14.3 sec;   INR: 1.20 ratio     PTT - ( 02 Sep 2021 10:31 )  PTT:30.4 sec        RADIOLOGY & ADDITIONAL TESTS:       KARLA LORENZ  [id number]    INTERVAL HPI/ OVERNIGHT EVENTS: s/p Solumedrol 500mg yesterday and this morning, feels about the same, pending muscle bx.     PMHx/PSHx/FamHx/SocHx reviewed and no significant changes    REVIEW OF SYSTEMS   - reviewed with patient and  negative other than as above or previously documented.     MEDICATIONS  (STANDING):  aspirin enteric coated 81 milliGRAM(s) Oral daily  dextrose 40% Gel 15 Gram(s) Oral once  dextrose 5%. 1000 milliLiter(s) (100 mL/Hr) IV Continuous <Continuous>  dextrose 5%. 1000 milliLiter(s) (50 mL/Hr) IV Continuous <Continuous>  dextrose 50% Injectable 25 Gram(s) IV Push once  dextrose 50% Injectable 12.5 Gram(s) IV Push once  dextrose 50% Injectable 25 Gram(s) IV Push once  glucagon  Injectable 1 milliGRAM(s) IntraMuscular once  insulin glargine Injectable (LANTUS) 5 Unit(s) SubCutaneous at bedtime  insulin lispro (ADMELOG) corrective regimen sliding scale   SubCutaneous three times a day before meals  insulin lispro (ADMELOG) corrective regimen sliding scale   SubCutaneous at bedtime  insulin lispro Injectable (ADMELOG) 4 Unit(s) SubCutaneous three times a day before meals  lactated ringers. 1000 milliLiter(s) (75 mL/Hr) IV Continuous <Continuous>  magnesium sulfate  IVPB 2 Gram(s) IV Intermittent once  methylPREDNISolone sodium succinate IVPB 500 milliGRAM(s) IV Intermittent daily  multivitamin 1 Tablet(s) Oral daily  pantoprazole    Tablet 40 milliGRAM(s) Oral before breakfast  thiamine 100 milliGRAM(s) Oral daily  trimethoprim  160 mG/sulfamethoxazole 800 mG 1 Tablet(s) Oral daily    MEDICATIONS  (PRN):  Allergies  No Known Allergies  Intolerances    Vital Signs Last 24 Hrs  T(C): 36.4 (02 Sep 2021 11:16), Max: 37.2 (01 Sep 2021 20:07)  T(F): 97.5 (02 Sep 2021 11:16), Max: 98.9 (01 Sep 2021 20:07)  HR: 93 (02 Sep 2021 11:16) (83 - 105)  BP: 104/64 (02 Sep 2021 11:16) (101/66 - 118/72)  BP(mean): --  RR: 17 (02 Sep 2021 11:16) (16 - 18)  SpO2: 98% (02 Sep 2021 11:16) (95% - 98%)    Physical Exam:  General: NAD, thin   HEENT: EOMI, MMM  Cardio: +S1/S2, RRR  Resp: CTA b/l  GI: +BS, soft, NT/ND  MSK: no synovitis, NTP   muscle strength:    shoulder: unable to perform   bicep/triceps: 2/5 b/l   finger/: 5/5 b/l   hip flexion: 3/5 b/l    Neuro: AAOx3  Psych: wnl      LABS:                        11.1   10.43 )-----------( 425      ( 02 Sep 2021 10:31 )             35.4     09-02    134<L>  |  94<L>  |  21  ----------------------------<  318<H>  4.6   |  23  |  0.47<L>    Ca    8.8      02 Sep 2021 10:31  Phos  5.2     09-02  Mg     1.8     09-02    TPro  7.2  /  Alb  3.0<L>  /  TBili  0.2  /  DBili  x   /  AST  109<H>  /  ALT  179<H>  /  AlkPhos  81  09-02    PT/INR - ( 02 Sep 2021 10:31 )   PT: 14.3 sec;   INR: 1.20 ratio     PTT - ( 02 Sep 2021 10:31 )  PTT:30.4 sec      RADIOLOGY & ADDITIONAL TESTS:  m< from: Xray Cinesophagram Swallow Function w/ Contrast (09.02.21 @ 14:00) >  IMPRESSION:  Penetration with aspiration for thin liquids. Penetration without aspiration for puree thick solids, honey thick liquids, and nectar thick liquids. No penetration or aspiration for mechanical soft solids. A full report will be issued by the department of speech and hearing.    < end of copied text >

## 2021-09-02 NOTE — DIETITIAN INITIAL EVALUATION ADULT. - PERTINENT LABORATORY DATA
Na 134, Cr 0.47, glucose 318, phosphorus 5.2    HbA1c (8/31): 7.2%    CAPILLARY BLOOD GLUCOSE  POCT Blood Glucose.: 331 mg/dL (02 Sep 2021 11:50)  POCT Blood Glucose.: 252 mg/dL (02 Sep 2021 08:03)  POCT Blood Glucose.: 160 mg/dL (01 Sep 2021 21:01)  POCT Blood Glucose.: 116 mg/dL (01 Sep 2021 16:58)

## 2021-09-02 NOTE — PHYSICAL THERAPY INITIAL EVALUATION ADULT - PRECAUTIONS/LIMITATIONS, REHAB EVAL
DDx: Overall picture favors inflammatory myopathy; without obvious rash favor polymyositis vs inclusion body myositis vs necrotizing autoimmune myositis. Also on the differential are malignancy, HIV, amyloidosis, TB, or chronic infection./cardiac precautions/fall precautions

## 2021-09-02 NOTE — DIETITIAN INITIAL EVALUATION ADULT. - REASON INDICATOR FOR ASSESSMENT
Consult received for unintentional wt loss PTA, assessment, education. Source: EMR, pt. Pt is a 62 yo male with PMH of DM2 who presented with subacute weakness, weight loss, and dysphagia. Admitted 8/30.

## 2021-09-02 NOTE — PROGRESS NOTE ADULT - PROBLEM SELECTOR PLAN 2
Patients presentation of subacute-chronic weight loss, fatigue, proximal muscle weakness, (neuromuscular) dysphagia, and elevated troponins, CK, aldolase, CRP, and transaminases favor inflammatory myopathy. Without rash dermatomyositis is less likely. W/o any joint pain. Patient was prescribed statins upon discharge from [facility] but he did not report taking them and symptoms began prior to that point. DDx includes PM, IBM, NM. Will repeat TSH although was 3.4 as outpatient.   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 parvo pending Patients presentation of subacute-chronic weight loss, fatigue, proximal muscle weakness, (neuromuscular) dysphagia, and elevated troponins, CK, aldolase, CRP, and transaminases favor inflammatory myopathy. Without rash dermatomyositis is less likely. W/o any joint pain. Patient was prescribed statins upon discharge from [facility] but he did not report taking them and symptoms began prior to that point. DDx includes PM, IBM, NM. Will repeat TSH although was 3.4 as outpatient.   - CT c/a/p without masses or evidence of malignancy  -rheumatology following    - solumedrol pulse dose x3days, started on IVIG x5 days    - f/u lab panels    - MRI of leg consistent with myositis, general surgery consulted for muscle biopsy, likely Monday    - Bactrim started for PCP ppx, Protonix started for GI ppx    -also on DDX:  --amyloidosis - +protein gap and ?cardiac/hepatic dysfunction - f/u SPEP/UPEP/IF  --viral infection - f/u HIV, hepatitis panel, EBV, CMV all negative. parvo pending

## 2021-09-03 LAB
ALBUMIN SERPL ELPH-MCNC: 2.7 G/DL — LOW (ref 3.3–5)
ALP SERPL-CCNC: 74 U/L — SIGNIFICANT CHANGE UP (ref 40–120)
ALT FLD-CCNC: 177 U/L — HIGH (ref 10–45)
ANION GAP SERPL CALC-SCNC: 15 MMOL/L — SIGNIFICANT CHANGE UP (ref 5–17)
APTT BLD: 27 SEC — LOW (ref 27.5–35.5)
AST SERPL-CCNC: 70 U/L — HIGH (ref 10–40)
B19V IGG SER-ACNC: 5.13 INDEX — HIGH (ref 0–0.9)
B19V IGG+IGM SER-IMP: POSITIVE
B19V IGG+IGM SER-IMP: SIGNIFICANT CHANGE UP
B19V IGM FLD-ACNC: 0.13 INDEX — SIGNIFICANT CHANGE UP (ref 0–0.9)
B19V IGM SER-ACNC: NEGATIVE — SIGNIFICANT CHANGE UP
BASOPHILS # BLD AUTO: 0.01 K/UL — SIGNIFICANT CHANGE UP (ref 0–0.2)
BASOPHILS NFR BLD AUTO: 0.1 % — SIGNIFICANT CHANGE UP (ref 0–2)
BILIRUB SERPL-MCNC: 0.1 MG/DL — LOW (ref 0.2–1.2)
BLD GP AB SCN SERPL QL: NEGATIVE — SIGNIFICANT CHANGE UP
BUN SERPL-MCNC: 25 MG/DL — HIGH (ref 7–23)
CALCIUM SERPL-MCNC: 9.4 MG/DL — SIGNIFICANT CHANGE UP (ref 8.4–10.5)
CHLORIDE SERPL-SCNC: 98 MMOL/L — SIGNIFICANT CHANGE UP (ref 96–108)
CO2 SERPL-SCNC: 21 MMOL/L — LOW (ref 22–31)
CREAT SERPL-MCNC: 0.57 MG/DL — SIGNIFICANT CHANGE UP (ref 0.5–1.3)
CREATININE, URINE RESULT: 77 MG/DL — SIGNIFICANT CHANGE UP
EOSINOPHIL # BLD AUTO: 0 K/UL — SIGNIFICANT CHANGE UP (ref 0–0.5)
EOSINOPHIL NFR BLD AUTO: 0 % — SIGNIFICANT CHANGE UP (ref 0–6)
GLUCOSE BLDC GLUCOMTR-MCNC: 117 MG/DL — HIGH (ref 70–99)
GLUCOSE BLDC GLUCOMTR-MCNC: 158 MG/DL — HIGH (ref 70–99)
GLUCOSE BLDC GLUCOMTR-MCNC: 266 MG/DL — HIGH (ref 70–99)
GLUCOSE BLDC GLUCOMTR-MCNC: 371 MG/DL — HIGH (ref 70–99)
GLUCOSE SERPL-MCNC: 379 MG/DL — HIGH (ref 70–99)
HCT VFR BLD CALC: 32.6 % — LOW (ref 39–50)
HGB BLD-MCNC: 10.3 G/DL — LOW (ref 13–17)
IMM GRANULOCYTES NFR BLD AUTO: 0.6 % — SIGNIFICANT CHANGE UP (ref 0–1.5)
INR BLD: 1.12 RATIO — SIGNIFICANT CHANGE UP (ref 0.88–1.16)
LYMPHOCYTES # BLD AUTO: 1.08 K/UL — SIGNIFICANT CHANGE UP (ref 1–3.3)
LYMPHOCYTES # BLD AUTO: 8.5 % — LOW (ref 13–44)
MAGNESIUM SERPL-MCNC: 2.2 MG/DL — SIGNIFICANT CHANGE UP (ref 1.6–2.6)
MCHC RBC-ENTMCNC: 27 PG — SIGNIFICANT CHANGE UP (ref 27–34)
MCHC RBC-ENTMCNC: 31.6 GM/DL — LOW (ref 32–36)
MCV RBC AUTO: 85.6 FL — SIGNIFICANT CHANGE UP (ref 80–100)
MONOCYTES # BLD AUTO: 0.36 K/UL — SIGNIFICANT CHANGE UP (ref 0–0.9)
MONOCYTES NFR BLD AUTO: 2.8 % — SIGNIFICANT CHANGE UP (ref 2–14)
NEUTROPHILS # BLD AUTO: 11.13 K/UL — HIGH (ref 1.8–7.4)
NEUTROPHILS NFR BLD AUTO: 88 % — HIGH (ref 43–77)
NRBC # BLD: 0 /100 WBCS — SIGNIFICANT CHANGE UP (ref 0–0)
PHOSPHATE SERPL-MCNC: 3.8 MG/DL — SIGNIFICANT CHANGE UP (ref 2.5–4.5)
PLATELET # BLD AUTO: 408 K/UL — HIGH (ref 150–400)
POTASSIUM SERPL-MCNC: 4.6 MMOL/L — SIGNIFICANT CHANGE UP (ref 3.5–5.3)
POTASSIUM SERPL-SCNC: 4.6 MMOL/L — SIGNIFICANT CHANGE UP (ref 3.5–5.3)
PROT ?TM UR-MCNC: 87 MG/DL — HIGH (ref 0–12)
PROT SERPL-MCNC: 7.5 G/DL — SIGNIFICANT CHANGE UP (ref 6–8.3)
PROTHROM AB SERPL-ACNC: 13.4 SEC — SIGNIFICANT CHANGE UP (ref 10.6–13.6)
PSA FLD-MCNC: 0.23 NG/ML — SIGNIFICANT CHANGE UP (ref 0–4)
RBC # BLD: 3.81 M/UL — LOW (ref 4.2–5.8)
RBC # FLD: 12.7 % — SIGNIFICANT CHANGE UP (ref 10.3–14.5)
RH IG SCN BLD-IMP: POSITIVE — SIGNIFICANT CHANGE UP
SODIUM SERPL-SCNC: 134 MMOL/L — LOW (ref 135–145)
WBC # BLD: 12.65 K/UL — HIGH (ref 3.8–10.5)
WBC # FLD AUTO: 12.65 K/UL — HIGH (ref 3.8–10.5)

## 2021-09-03 PROCEDURE — 99233 SBSQ HOSP IP/OBS HIGH 50: CPT

## 2021-09-03 PROCEDURE — 99233 SBSQ HOSP IP/OBS HIGH 50: CPT | Mod: GC

## 2021-09-03 RX ORDER — INSULIN GLARGINE 100 [IU]/ML
10 INJECTION, SOLUTION SUBCUTANEOUS AT BEDTIME
Refills: 0 | Status: DISCONTINUED | OUTPATIENT
Start: 2021-09-03 | End: 2021-09-05

## 2021-09-03 RX ORDER — INSULIN LISPRO 100/ML
8 VIAL (ML) SUBCUTANEOUS
Refills: 0 | Status: DISCONTINUED | OUTPATIENT
Start: 2021-09-03 | End: 2021-09-03

## 2021-09-03 RX ORDER — ENOXAPARIN SODIUM 100 MG/ML
40 INJECTION SUBCUTANEOUS DAILY
Refills: 0 | Status: DISCONTINUED | OUTPATIENT
Start: 2021-09-03 | End: 2021-09-07

## 2021-09-03 RX ORDER — ACETAMINOPHEN 500 MG
1000 TABLET ORAL ONCE
Refills: 0 | Status: COMPLETED | OUTPATIENT
Start: 2021-09-03 | End: 2021-09-03

## 2021-09-03 RX ORDER — INSULIN LISPRO 100/ML
4 VIAL (ML) SUBCUTANEOUS
Refills: 0 | Status: DISCONTINUED | OUTPATIENT
Start: 2021-09-03 | End: 2021-09-09

## 2021-09-03 RX ORDER — INSULIN LISPRO 100/ML
6 VIAL (ML) SUBCUTANEOUS
Refills: 0 | Status: DISCONTINUED | OUTPATIENT
Start: 2021-09-03 | End: 2021-09-03

## 2021-09-03 RX ADMIN — Medication 6 UNIT(S): at 12:03

## 2021-09-03 RX ADMIN — Medication 100 MILLIGRAM(S): at 06:20

## 2021-09-03 RX ADMIN — INSULIN GLARGINE 10 UNIT(S): 100 INJECTION, SOLUTION SUBCUTANEOUS at 21:51

## 2021-09-03 RX ADMIN — Medication 10: at 12:03

## 2021-09-03 RX ADMIN — PANTOPRAZOLE SODIUM 40 MILLIGRAM(S): 20 TABLET, DELAYED RELEASE ORAL at 06:20

## 2021-09-03 RX ADMIN — Medication 1 TABLET(S): at 11:14

## 2021-09-03 RX ADMIN — Medication 8 UNIT(S): at 16:25

## 2021-09-03 RX ADMIN — Medication 100 MILLIGRAM(S): at 11:14

## 2021-09-03 RX ADMIN — Medication 6: at 07:53

## 2021-09-03 RX ADMIN — Medication 1000 UNIT(S): at 11:14

## 2021-09-03 RX ADMIN — Medication 1 TABLET(S): at 11:15

## 2021-09-03 RX ADMIN — Medication 81 MILLIGRAM(S): at 11:14

## 2021-09-03 RX ADMIN — Medication 2: at 16:24

## 2021-09-03 RX ADMIN — Medication 50 MILLIGRAM(S): at 20:46

## 2021-09-03 RX ADMIN — ENOXAPARIN SODIUM 40 MILLIGRAM(S): 100 INJECTION SUBCUTANEOUS at 16:27

## 2021-09-03 RX ADMIN — Medication 4 UNIT(S): at 07:54

## 2021-09-03 RX ADMIN — IMMUNE GLOBULIN (HUMAN) 83.33 GRAM(S): 10 INJECTION INTRAVENOUS; SUBCUTANEOUS at 21:22

## 2021-09-03 RX ADMIN — Medication 1000 MILLIGRAM(S): at 20:46

## 2021-09-03 RX ADMIN — Medication 60 MILLIGRAM(S): at 19:15

## 2021-09-03 NOTE — PROGRESS NOTE ADULT - ASSESSMENT
ASSESSMENT: Patient is a 61y old m with DM2, p/w subacute proximal muscle weakness, weight loss, dysphagia and troponemia. Concern for polymyositis and cardiomyositis. Surgery consulted for muscle biopsy.    PLAN:   - Surgery team to perform muscle biopsy in OR, left thigh per MRI read  - Tomorrow vs. Monday depending on OR/surgeon availability  - Cardiology clearance noted, please document medical risk stratification/optimization  - Please send preop labs - Type and screen within 3 days, cbc, bmp with mg and ph in the am, CXR and EKG this admission  - NPO at midnight when date confirmed    - Patient discussed with attending Dr. Wei ASSESSMENT: Patient is a 61y old m with DM2, p/w subacute proximal muscle weakness, weight loss, dysphagia and troponemia. Concern for polymyositis and cardiomyositis. Surgery consulted for muscle biopsy.    PLAN:   - Surgery team to perform muscle biopsy in OR, left thigh per MRI read  - Booked for OR Tuesday 9/7 @ 10:30am  - Cardiology clearance noted, please document medical risk stratification/optimization  - Please send preop labs - Type and screen within 3 days, cbc, bmp with mg and ph in the am, repeat STAT COVID on 9/6, CXR and EKG this admission  - NPO at midnight 9/7 (late Monday into early Tuesday)  - Care per primary team

## 2021-09-03 NOTE — PROGRESS NOTE ADULT - PROBLEM SELECTOR PLAN 2
Patients presentation of subacute-chronic weight loss, fatigue, proximal muscle weakness, (neuromuscular) dysphagia, and elevated troponins, CK, aldolase, CRP, and transaminases favor inflammatory myopathy. Without rash dermatomyositis is less likely. W/o any joint pain. Patient was prescribed statins upon discharge from [facility] but he did not report taking them and symptoms began prior to that point. DDx includes PM, IBM, NM. Will repeat TSH although was 3.4 as outpatient.   - CT c/a/p without masses or evidence of malignancy  -rheumatology following    - solumedrol pulse dose x3days, started on IVIG x5 days    - f/u lab panels    - MRI of leg consistent with myositis, general surgery consulted for muscle biopsy, likely Monday    - Bactrim started for PCP ppx, Protonix started for GI ppx    -also on DDX:  --amyloidosis - +protein gap and ?cardiac/hepatic dysfunction - f/u SPEP/UPEP/IF  --viral infection - f/u HIV, hepatitis panel, EBV, CMV all negative. parvo pending POLYMYOSITIS from MRI of the lower extremities     Patients presentation of subacute-chronic weight loss, fatigue, proximal muscle weakness, (neuromuscular) dysphagia, and elevated troponins, CK, aldolase, CRP, and transaminases favor inflammatory myopathy. Without rash dermatomyositis is less likely. W/o any joint pain. Patient was prescribed statins upon discharge from [facility] but he did not report taking them and symptoms began prior to that point. DDx includes PM, IBM, NM. Will repeat TSH although was 3.4 as outpatient.   - CT c/a/p without masses or evidence of malignancy  -rheumatology following    - solumedrol pulse dose x3days, started on IVIG x5 days    - f/u lab panels    - MRI of leg consistent with myositis, general surgery consulted for muscle biopsy, likely Monday    - Bactrim started for PCP ppx, Protonix started for GI ppx    -also on DDX:  --amyloidosis - +protein gap and ?cardiac/hepatic dysfunction - f/u SPEP/UPEP/IF  --viral infection - f/u HIV, hepatitis panel, EBV, CMV all negative. parvo pending troponin elevated on admission with subsequent downtrend 1022->991. EKg with sinus tachycardia and without evidence of ischemia. patient denies exertional (or any) chest pain dyspnea, orthopnea. BNP wnl. recent echo with LVEF 70%, normal RVSF, and no valvular abnormalities per discharge paperwork. Very low suspicion for ACS.  -suspect elevation in the setting of myositis vs myocarditis  - ECHO with hyperdynamic EF=75-80%, mild diastolic dysfunction grade I  - LHC w/ nonobstructive CAD  - c/w low dose aspirin  - continue to hold statins in the setting of myopathy and elevated AST/ALT  - c/w telemetry monitoring  - cMRI Tuesday 9/7 to investigate myocarditis (COVID vaccine one month ago)

## 2021-09-03 NOTE — PROGRESS NOTE ADULT - SUBJECTIVE AND OBJECTIVE BOX
KARLA LORENZ  [id number]    INTERVAL HPI/OVERNIGHT EVENTS:        PMHx/PSHx/FamHx/SocHx reviewed and no significant changes    REVIEW OF SYSTEMS   - reviewed with patient and  negative other than as above or previously documented.     MEDICATIONS  (STANDING):  aspirin enteric coated 81 milliGRAM(s) Oral daily  cholecalciferol 1000 Unit(s) Oral daily  dextrose 40% Gel 15 Gram(s) Oral once  dextrose 5%. 1000 milliLiter(s) (100 mL/Hr) IV Continuous <Continuous>  dextrose 5%. 1000 milliLiter(s) (50 mL/Hr) IV Continuous <Continuous>  dextrose 50% Injectable 25 Gram(s) IV Push once  dextrose 50% Injectable 12.5 Gram(s) IV Push once  dextrose 50% Injectable 25 Gram(s) IV Push once  enoxaparin Injectable 40 milliGRAM(s) SubCutaneous daily  glucagon  Injectable 1 milliGRAM(s) IntraMuscular once  immune   globulin 10% (GAMMAGARD) IVPB 25 Gram(s) IV Intermittent daily  insulin glargine Injectable (LANTUS) 10 Unit(s) SubCutaneous at bedtime  insulin lispro (ADMELOG) corrective regimen sliding scale   SubCutaneous three times a day before meals  insulin lispro (ADMELOG) corrective regimen sliding scale   SubCutaneous at bedtime  insulin lispro Injectable (ADMELOG) 8 Unit(s) SubCutaneous three times a day before meals  lactated ringers. 1000 milliLiter(s) (75 mL/Hr) IV Continuous <Continuous>  multivitamin 1 Tablet(s) Oral daily  pantoprazole    Tablet 40 milliGRAM(s) Oral before breakfast  thiamine 100 milliGRAM(s) Oral daily  trimethoprim  160 mG/sulfamethoxazole 800 mG 1 Tablet(s) Oral daily    MEDICATIONS  (PRN):  diphenhydrAMINE   Injectable 50 milliGRAM(s) IV Push daily PRN pre IVIG administration  hydrocortisone sodium succinate Injectable 100 milliGRAM(s) IV Push daily PRN allergic reaction during IVIG      Allergies    No Known Allergies    Intolerances          Vital Signs Last 24 Hrs  T(C): 36.3 (03 Sep 2021 11:15), Max: 36.8 (02 Sep 2021 20:22)  T(F): 97.4 (03 Sep 2021 11:15), Max: 98.2 (02 Sep 2021 20:22)  HR: 96 (03 Sep 2021 11:15) (63 - 96)  BP: 116/72 (03 Sep 2021 11:15) (102/60 - 127/78)  BP(mean): --  RR: 17 (03 Sep 2021 11:15) (16 - 17)  SpO2: 98% (03 Sep 2021 11:15) (96% - 99%)    Physical Exam:  General: NAD  HEENT: EOMI, MMM  Cardio: +S1/S2, RRR  Resp: CTA b/l  GI: +BS, soft, NT/ND  MSK:  Neuro: AAOx3  Psych: wnl    LABS:                        10.3   12.65 )-----------( 408      ( 03 Sep 2021 11:11 )             32.6     09-03    134<L>  |  98  |  25<H>  ----------------------------<  379<H>  4.6   |  21<L>  |  0.57    Ca    9.4      03 Sep 2021 11:11  Phos  3.8     09-03  Mg     2.2     09-03    TPro  7.5  /  Alb  2.7<L>  /  TBili  0.1<L>  /  DBili  x   /  AST  70<H>  /  ALT  177<H>  /  AlkPhos  74  09-03    PT/INR - ( 03 Sep 2021 11:11 )   PT: 13.4 sec;   INR: 1.12 ratio         PTT - ( 03 Sep 2021 11:11 )  PTT:27.0 sec        RADIOLOGY & ADDITIONAL TESTS:       KARLA LORENZ  [id number]    INTERVAL HPI/OVERNIGHT EVENTS: feels much improved in muscle strength, able to swallow more food for lunch. No other complain     PMHx/PSHx/FamHx/SocHx reviewed and no significant changes    REVIEW OF SYSTEMS   - reviewed with patient and  negative other than as above or previously documented.     MEDICATIONS  (STANDING):  aspirin enteric coated 81 milliGRAM(s) Oral daily  cholecalciferol 1000 Unit(s) Oral daily  dextrose 40% Gel 15 Gram(s) Oral once  dextrose 5%. 1000 milliLiter(s) (100 mL/Hr) IV Continuous <Continuous>  dextrose 5%. 1000 milliLiter(s) (50 mL/Hr) IV Continuous <Continuous>  dextrose 50% Injectable 25 Gram(s) IV Push once  dextrose 50% Injectable 12.5 Gram(s) IV Push once  dextrose 50% Injectable 25 Gram(s) IV Push once  enoxaparin Injectable 40 milliGRAM(s) SubCutaneous daily  glucagon  Injectable 1 milliGRAM(s) IntraMuscular once  immune   globulin 10% (GAMMAGARD) IVPB 25 Gram(s) IV Intermittent daily  insulin glargine Injectable (LANTUS) 10 Unit(s) SubCutaneous at bedtime  insulin lispro (ADMELOG) corrective regimen sliding scale   SubCutaneous three times a day before meals  insulin lispro (ADMELOG) corrective regimen sliding scale   SubCutaneous at bedtime  insulin lispro Injectable (ADMELOG) 8 Unit(s) SubCutaneous three times a day before meals  lactated ringers. 1000 milliLiter(s) (75 mL/Hr) IV Continuous <Continuous>  multivitamin 1 Tablet(s) Oral daily  pantoprazole    Tablet 40 milliGRAM(s) Oral before breakfast  thiamine 100 milliGRAM(s) Oral daily  trimethoprim  160 mG/sulfamethoxazole 800 mG 1 Tablet(s) Oral daily    MEDICATIONS  (PRN):  diphenhydrAMINE   Injectable 50 milliGRAM(s) IV Push daily PRN pre IVIG administration  hydrocortisone sodium succinate Injectable 100 milliGRAM(s) IV Push daily PRN allergic reaction during IVIG      Allergies  No Known Allergies  Intolerances    Vital Signs Last 24 Hrs  T(C): 36.3 (03 Sep 2021 11:15), Max: 36.8 (02 Sep 2021 20:22)  T(F): 97.4 (03 Sep 2021 11:15), Max: 98.2 (02 Sep 2021 20:22)  HR: 96 (03 Sep 2021 11:15) (63 - 96)  BP: 116/72 (03 Sep 2021 11:15) (102/60 - 127/78)  BP(mean): --  RR: 17 (03 Sep 2021 11:15) (16 - 17)  SpO2: 98% (03 Sep 2021 11:15) (96% - 99%)    Physical Exam:  General: NAD, thin   HEENT: EOMI, MMM  Cardio: +S1/S2, RRR  Resp: CTA b/l  GI: +BS, soft, NT/ND  MSK: no synovitis, NTP   muscle strength:    shoulder: unable to adduct/abduct, flexion/extension     bicep/triceps: 3/5 b/l   finger/: 5/5 b/l   hip flexion: 4/5 b/1   Neuro: AAOx3  Psych: wnl    LABS:                        10.3   12.65 )-----------( 408      ( 03 Sep 2021 11:11 )             32.6     09-03    134<L>  |  98  |  25<H>  ----------------------------<  379<H>  4.6   |  21<L>  |  0.57    Ca    9.4      03 Sep 2021 11:11  Phos  3.8     09-03  Mg     2.2     09-03    TPro  7.5  /  Alb  2.7<L>  /  TBili  0.1<L>  /  DBili  x   /  AST  70<H>  /  ALT  177<H>  /  AlkPhos  74  09-03    PT/INR - ( 03 Sep 2021 11:11 )   PT: 13.4 sec;   INR: 1.12 ratio    PTT - ( 03 Sep 2021 11:11 )  PTT:27.0 sec        RADIOLOGY & ADDITIONAL TESTS:

## 2021-09-03 NOTE — OCCUPATIONAL THERAPY INITIAL EVALUATION ADULT - ADDITIONAL COMMENTS
that when present makes it impossible to eat or drink anything resulting in choking/coughing/regurgitation. Around the same time he reports progressive SOB and fatigue and a ~40lb weight loss. he reports weakness of his biceps and legs and has difficulty standing from a seated position as well as reaching his arms over his head. He also has a bruise on his R bicep from when someone lightly squeezed it that has grown very large. He was admitted several times to [facility] with extensive workup. He had labs showing elevation in CK, CRP, aldolase, troponin, CKMB, AST/ALT. MRI Femur:   Diffuse, mostly symmetric, muscle edema and enhancement with an imaging appearance that is suggestive of of polymyositis, as further described above. Possible candidates for muscle biopsy include the right gluteal muscles or left proximal sartorius/rectus femoris.

## 2021-09-03 NOTE — PROGRESS NOTE ADULT - PROBLEM SELECTOR PLAN 1
troponin elevated on admission with subsequent downtrend 1022->991. EKg with sinus tachycardia and without evidence of ischemia. patient denies exertional (or any) chest pain dyspnea, orthopnea. BNP wnl. recent echo with LVEF 70%, normal RVSF, and no valvular abnormalities per discharge paperwork. Very low suspicion for ACS.  -suspect elevation in the setting of myositis vs myocarditis  - acute decrease in troponin after solumedrol  - ECHO with hyperdynamic EF=75-80%, mild diastolic dysfunction grade I  - LHC w/ nonobstructive CAD  - c/w low dose aspirin  - continue to hold statins in the setting of myopathy and elevated AST/ALT  - c/w telemetry monitoring  - cardiac MRI to investigate myocarditis (COVID vaccine one month ago) Patients presentation of subacute-chronic weight loss, fatigue, proximal muscle weakness, (neuromuscular) dysphagia, and elevated troponins, CK, aldolase, CRP, and transaminases favor inflammatory myopathy. Without rash dermatomyositis is less likely. W/o any joint pain. Patient was prescribed statins upon discharge from [facility] but he did not report taking them and symptoms began prior to that point. DDx includes PM, IBM, NM. Will repeat TSH although was 3.4 as outpatient.   - CT c/a/p without masses or evidence of malignancy  - confied by MRI of left thigh, findings consistent with polymyositis  -rheumatology following    - solumedrol pulse dose x3days (last today), started on IVIG x5 days (finish 9/6)    - Bactrim started for PCP ppx, Protonix started for GI ppx    - f/u lab panels  - muscle biopsy with general surgery Monday    - NPO Sunday, hold DVT ppx, will need stat covid

## 2021-09-03 NOTE — OCCUPATIONAL THERAPY INITIAL EVALUATION ADULT - STRENGTHENING, PT EVAL
GOAL: Pt will improve bilateral shoulder strength to 3/5 to increase independence in ADLs within 4 weeks

## 2021-09-03 NOTE — PROGRESS NOTE ADULT - SUBJECTIVE AND OBJECTIVE BOX
TRAUMA SURGERY PROGRESS NOTE    Subjective:  No new complaints    Objective:   Vital Signs Last 24 Hrs  T(C): 36.3 (03 Sep 2021 11:15), Max: 36.8 (02 Sep 2021 20:22)  T(F): 97.4 (03 Sep 2021 11:15), Max: 98.2 (02 Sep 2021 20:22)  HR: 96 (03 Sep 2021 11:15) (63 - 96)  BP: 116/72 (03 Sep 2021 11:15) (102/60 - 127/78)  RR: 17 (03 Sep 2021 11:15) (16 - 17)  SpO2: 98% (03 Sep 2021 11:15) (96% - 99%)    Physical Exam:      Labs:    09-03    134<L>  |  98  |  25<H>  ----------------------------<  379<H>  4.6   |  21<L>  |  0.57    Ca    9.4      03 Sep 2021 11:11  Phos  3.8     09-03  Mg     2.2     09-03    TPro  7.5  /  Alb  2.7<L>  /  TBili  0.1<L>  /  DBili  x   /  AST  70<H>  /  ALT  177<H>  /  AlkPhos  74  09-03 09-03    134<L>  |  98  |  25<H>  ----------------------------<  379<H>  4.6   |  21<L>  |  0.57    Ca    9.4      03 Sep 2021 11:11  Phos  3.8     09-03  Mg     2.2     09-03    TPro  7.5  /  Alb  2.7<L>  /  TBili  0.1<L>  /  DBili  x   /  AST  70<H>  /  ALT  177<H>  /  AlkPhos  74  09-03    LIVER FUNCTIONS - ( 03 Sep 2021 11:11 )  Alb: 2.7 g/dL / Pro: 7.5 g/dL / ALK PHOS: 74 U/L / ALT: 177 U/L / AST: 70 U/L / GGT: x                                 10.3   12.65 )-----------( 408      ( 03 Sep 2021 11:11 )             32.6     Radiology    EKG:  tele:  TTE:  EEG:      MEDICATIONS  (STANDING):  aspirin enteric coated 81 milliGRAM(s) Oral daily  cholecalciferol 1000 Unit(s) Oral daily  dextrose 40% Gel 15 Gram(s) Oral once  dextrose 5%. 1000 milliLiter(s) (100 mL/Hr) IV Continuous <Continuous>  dextrose 5%. 1000 milliLiter(s) (50 mL/Hr) IV Continuous <Continuous>  dextrose 50% Injectable 25 Gram(s) IV Push once  dextrose 50% Injectable 12.5 Gram(s) IV Push once  dextrose 50% Injectable 25 Gram(s) IV Push once  enoxaparin Injectable 40 milliGRAM(s) SubCutaneous daily  glucagon  Injectable 1 milliGRAM(s) IntraMuscular once  immune   globulin 10% (GAMMAGARD) IVPB 25 Gram(s) IV Intermittent daily  insulin glargine Injectable (LANTUS) 10 Unit(s) SubCutaneous at bedtime  insulin lispro (ADMELOG) corrective regimen sliding scale   SubCutaneous three times a day before meals  insulin lispro (ADMELOG) corrective regimen sliding scale   SubCutaneous at bedtime  insulin lispro Injectable (ADMELOG) 8 Unit(s) SubCutaneous three times a day before meals  lactated ringers. 1000 milliLiter(s) (75 mL/Hr) IV Continuous <Continuous>  multivitamin 1 Tablet(s) Oral daily  pantoprazole    Tablet 40 milliGRAM(s) Oral before breakfast  thiamine 100 milliGRAM(s) Oral daily  trimethoprim  160 mG/sulfamethoxazole 800 mG 1 Tablet(s) Oral daily    MEDICATIONS  (PRN):  diphenhydrAMINE   Injectable 50 milliGRAM(s) IV Push daily PRN pre IVIG administration  hydrocortisone sodium succinate Injectable 100 milliGRAM(s) IV Push daily PRN allergic reaction during IVIG                TRAUMA SURGERY PROGRESS NOTE    Subjective:  No new complaints.    Objective:   Vital Signs Last 24 Hrs  T(C): 36.3 (03 Sep 2021 11:15), Max: 36.8 (02 Sep 2021 20:22)  T(F): 97.4 (03 Sep 2021 11:15), Max: 98.2 (02 Sep 2021 20:22)  HR: 96 (03 Sep 2021 11:15) (63 - 96)  BP: 116/72 (03 Sep 2021 11:15) (102/60 - 127/78)  RR: 17 (03 Sep 2021 11:15) (16 - 17)  SpO2: 98% (03 Sep 2021 11:15) (96% - 99%)    Physical Exam:      Labs:    09-03    134<L>  |  98  |  25<H>  ----------------------------<  379<H>  4.6   |  21<L>  |  0.57    Ca    9.4      03 Sep 2021 11:11  Phos  3.8     09-03  Mg     2.2     09-03    TPro  7.5  /  Alb  2.7<L>  /  TBili  0.1<L>  /  DBili  x   /  AST  70<H>  /  ALT  177<H>  /  AlkPhos  74  09-03 09-03    134<L>  |  98  |  25<H>  ----------------------------<  379<H>  4.6   |  21<L>  |  0.57    Ca    9.4      03 Sep 2021 11:11  Phos  3.8     09-03  Mg     2.2     09-03    TPro  7.5  /  Alb  2.7<L>  /  TBili  0.1<L>  /  DBili  x   /  AST  70<H>  /  ALT  177<H>  /  AlkPhos  74  09-03    LIVER FUNCTIONS - ( 03 Sep 2021 11:11 )  Alb: 2.7 g/dL / Pro: 7.5 g/dL / ALK PHOS: 74 U/L / ALT: 177 U/L / AST: 70 U/L / GGT: x                                 10.3   12.65 )-----------( 408      ( 03 Sep 2021 11:11 )             32.6     Radiology    EKG:  tele:  TTE:  EEG:      MEDICATIONS  (STANDING):  aspirin enteric coated 81 milliGRAM(s) Oral daily  cholecalciferol 1000 Unit(s) Oral daily  dextrose 40% Gel 15 Gram(s) Oral once  dextrose 5%. 1000 milliLiter(s) (100 mL/Hr) IV Continuous <Continuous>  dextrose 5%. 1000 milliLiter(s) (50 mL/Hr) IV Continuous <Continuous>  dextrose 50% Injectable 25 Gram(s) IV Push once  dextrose 50% Injectable 12.5 Gram(s) IV Push once  dextrose 50% Injectable 25 Gram(s) IV Push once  enoxaparin Injectable 40 milliGRAM(s) SubCutaneous daily  glucagon  Injectable 1 milliGRAM(s) IntraMuscular once  immune   globulin 10% (GAMMAGARD) IVPB 25 Gram(s) IV Intermittent daily  insulin glargine Injectable (LANTUS) 10 Unit(s) SubCutaneous at bedtime  insulin lispro (ADMELOG) corrective regimen sliding scale   SubCutaneous three times a day before meals  insulin lispro (ADMELOG) corrective regimen sliding scale   SubCutaneous at bedtime  insulin lispro Injectable (ADMELOG) 8 Unit(s) SubCutaneous three times a day before meals  lactated ringers. 1000 milliLiter(s) (75 mL/Hr) IV Continuous <Continuous>  multivitamin 1 Tablet(s) Oral daily  pantoprazole    Tablet 40 milliGRAM(s) Oral before breakfast  thiamine 100 milliGRAM(s) Oral daily  trimethoprim  160 mG/sulfamethoxazole 800 mG 1 Tablet(s) Oral daily    MEDICATIONS  (PRN):  diphenhydrAMINE   Injectable 50 milliGRAM(s) IV Push daily PRN pre IVIG administration  hydrocortisone sodium succinate Injectable 100 milliGRAM(s) IV Push daily PRN allergic reaction during IVIG                TRAUMA SURGERY PROGRESS NOTE    Subjective:  No new complaints.    Objective:   Vital Signs Last 24 Hrs  T(C): 36.3 (03 Sep 2021 11:15), Max: 36.8 (02 Sep 2021 20:22)  T(F): 97.4 (03 Sep 2021 11:15), Max: 98.2 (02 Sep 2021 20:22)  HR: 96 (03 Sep 2021 11:15) (63 - 96)  BP: 116/72 (03 Sep 2021 11:15) (102/60 - 127/78)  RR: 17 (03 Sep 2021 11:15) (16 - 17)  SpO2: 98% (03 Sep 2021 11:15) (96% - 99%)    Physical Exam:      Labs:               10.3   12.65 )-----------( 408      ( 03 Sep 2021 11:11 )             32.6     09-03    134<L>  |  98  |  25<H>  ----------------------------<  379<H>  4.6   |  21<L>  |  0.57    Ca    9.4      03 Sep 2021 11:11  Phos  3.8     09-03  Mg     2.2     09-03    TPro  7.5  /  Alb  2.7<L>  /  TBili  0.1<L>  /  DBili  x   /  AST  70<H>  /  ALT  177<H>  /  AlkPhos  74  09-03    LIVER FUNCTIONS - ( 03 Sep 2021 11:11 )  Alb: 2.7 g/dL / Pro: 7.5 g/dL / ALK PHOS: 74 U/L / ALT: 177 U/L / AST: 70 U/L / GGT: x                       TRAUMA SURGERY PROGRESS NOTE    Subjective:  No new complaints.    Objective:   Vital Signs Last 24 Hrs  T(C): 36.3 (03 Sep 2021 11:15), Max: 36.8 (02 Sep 2021 20:22)  T(F): 97.4 (03 Sep 2021 11:15), Max: 98.2 (02 Sep 2021 20:22)  HR: 96 (03 Sep 2021 11:15) (63 - 96)  BP: 116/72 (03 Sep 2021 11:15) (102/60 - 127/78)  RR: 17 (03 Sep 2021 11:15) (16 - 17)  SpO2: 98% (03 Sep 2021 11:15) (96% - 99%)    Physical Exam:  General: NAD, lying in bed comfortably  Resp: no increased WOB  GI/Abd: Soft, nondistended, nontender    Labs:               10.3   12.65 )-----------( 408      ( 03 Sep 2021 11:11 )             32.6     09-03    134<L>  |  98  |  25<H>  ----------------------------<  379<H>  4.6   |  21<L>  |  0.57    Ca    9.4      03 Sep 2021 11:11  Phos  3.8     09-03  Mg     2.2     09-03    TPro  7.5  /  Alb  2.7<L>  /  TBili  0.1<L>  /  DBili  x   /  AST  70<H>  /  ALT  177<H>  /  AlkPhos  74  09-03    LIVER FUNCTIONS - ( 03 Sep 2021 11:11 )  Alb: 2.7 g/dL / Pro: 7.5 g/dL / ALK PHOS: 74 U/L / ALT: 177 U/L / AST: 70 U/L / GGT: x                       TRAUMA SURGERY PROGRESS NOTE    Subjective:  No new complaints.    Objective:   Vital Signs Last 24 Hrs  T(C): 36.3 (03 Sep 2021 11:15), Max: 36.8 (02 Sep 2021 20:22)  T(F): 97.4 (03 Sep 2021 11:15), Max: 98.2 (02 Sep 2021 20:22)  HR: 96 (03 Sep 2021 11:15) (63 - 96)  BP: 116/72 (03 Sep 2021 11:15) (102/60 - 127/78)  RR: 17 (03 Sep 2021 11:15) (16 - 17)  SpO2: 98% (03 Sep 2021 11:15) (96% - 99%)    Physical Exam:  General: NAD, lying in bed comfortably  Resp: no increased WOB  GI/Abd: Soft, nondistended, nontender    Labs:               10.3   12.65 )-----------( 408      ( 03 Sep 2021 11:11 )             32.6     09-03    134<L>  |  98  |  25<H>  ----------------------------<  379<H>  4.6   |  21<L>  |  0.57    Ca    9.4      03 Sep 2021 11:11  Phos  3.8     09-03  Mg     2.2     09-03    TPro  7.5  /  Alb  2.7<L>  /  TBili  0.1<L>  /  DBili  x   /  AST  70<H>  /  ALT  177<H>  /  AlkPhos  74  09-03    LIVER FUNCTIONS - ( 03 Sep 2021 11:11 )  Alb: 2.7 g/dL / Pro: 7.5 g/dL / ALK PHOS: 74 U/L / ALT: 177 U/L / AST: 70 U/L / GGT: x

## 2021-09-03 NOTE — PROGRESS NOTE ADULT - ASSESSMENT
60 yo M with PMHx of Diabetes and short term statin use presents with subacute progressively worsening proximal muscle weakness, dysphagia and 35 lb weight loss x 5 month. Physical exam significant for proximal weakness, no skin rash. CK in 3000s range.       # inflammatory myopathy  - differential polymyositis, dermatomyositis (lack of skin findings does not support this), inclusion body myositis (usually a distal myositis), statin induced myopathy (unlikely as he hasn't been on a statin in a few years) vs. necrotizing autoimmune myopathy  - Myomarker panel and HMG-CoA Reductase antibody pending    - anti-Natasha-1 negative   - hep panel negative, Quanti-gold negative   - MRI completed on 9/1- showed b/l diffuse muscle edema and enhancement, pending muscle bx next week    - s/p Solumedrol 500mg IV x 3 days (9/1- 9/3), muscle strength continue to improve, will start Solumedrol 60mg (1mg/kg) on 9/4    - continue GI prophylaxis and PCP prophylaxis    - start IVIG today x5 days (9/2-9/6),  please give IV Benadryl and Tylenol before infusion. IV hydrocortisone ordered PRN if allergic reaction.    - CT c/a/p: negative for intrathoracic and intra-abd malignancy    - iron deficiency anemia, please replete iron, never had colonoscopy before     - age appropriate malignancy screen in outpt setting    - will also need DEXA scan outpt.     Varinder Knapp, PGY-4  Rheumatology Fellow   Pager: [phone]

## 2021-09-03 NOTE — PROGRESS NOTE ADULT - PROBLEM SELECTOR PLAN 3
Normal MCV. Suspect AoCD.  - Hgb stable 11.1 from 10.2  - LDH elevated, haptoglobin and bili wnl  - b12/folate wnl  - iron 8% sat, serum iron 13, TIBC decreased, ferritin elevated 704 from 538  - continue to monitor Normal MCV. Suspect AoCD.  - Hgb stable 10.3 from 11.1  - LDH elevated, haptoglobin and bili wnl  - b12/folate wnl  - iron 8% sat, serum iron 13, TIBC decreased, ferritin elevated 704 from 538  - continue to monitor

## 2021-09-03 NOTE — PROGRESS NOTE ADULT - ASSESSMENT
Assessment:  1. Troponin T Elevated      No active C/P or SOB      Cardiac Cath 9/1: non-obstructive CAD  2. Myositis / CPK Elevated / Muscular Atrophy and Weakness  3. Dysphagia  4. Weight Loss   5. DM  6. Colon Thickening on prior CT  7. Anemia / Iron Deficiency Anemia     Plan:  1. Recommend Cardiac MRI to rule out myocarditis.     Troponin T was elevated, much higher than during prior admissions.     Cardiac cath w/ non-obstructive CAD, he also did have the COVID vaccine this year.     Troponin and CPK now down-trending after steroids started.   2. Continue ASA for medical management of non-obstructive CAD.   3. No further cardiac or vascular testing is needed prior to muscle biopsy or colonoscopy.  4. While NPO, he should have continuous IV fluids due to the mid cavitary gradients on Echo.      Oral hydration encouraged in general.   5. Anemia evaluation and Iron Deficiency repletion as per Primary Team and GI.  6. Statin on hold due to elevated CPK and concern for myositis.      Rheumatology consultation appreciated.  7. Dysphagia evaluation by GI / ENT / Speech and Swallow appreciated.  8. Recommend PSA and age appropriate CA screening with a colonoscopy.  9. Discussed with Primary Team.    Thank you  JUAN Witt, MPAS  Vascular Cardiology Service    Please call with any questions:   Service Line: [phone]  Office [phone]

## 2021-09-03 NOTE — OCCUPATIONAL THERAPY INITIAL EVALUATION ADULT - RANGE OF MOTION, PT EVAL
GOAL: Pt will increase AROM of B shoulders to WFL to improve ability to participate in functional activities and ADLs.

## 2021-09-03 NOTE — PROGRESS NOTE ADULT - PROBLEM SELECTOR PLAN 4
To solids and liquids and without significant symptoms of dyspepsia but with occasional regurgitation. No issue with initiating swallow but does have cough. favor esophageal > oropharyngeal dysphagia. s/p normal outpatient EGD.  Failed bedside swallow eval.  - likely neuromuscular in etiology  - regular diet per Speech&Swallow,   - oropharyngeal dysphagia per MBS today, S&S recommended keep NPO with replacement hydration  - patient refused NGT or PEG, insists on diet despite knowing risk of aspiration

## 2021-09-03 NOTE — PROGRESS NOTE ADULT - PROBLEM SELECTOR PLAN 6
Had colonic thickening on CT at OSH. planned for colonoscopy but held off due to elevated troponins and request for LHC prior to C-scope. per report, the thickening could have been due to underdistension.  -given current differential patient would likely benefit from malignancy workup anyway  - No evidence of malignancy on CT chest/abdo/pelvis   - GI following for dysphagia and colonic thickening, signed off at this time

## 2021-09-03 NOTE — OCCUPATIONAL THERAPY INITIAL EVALUATION ADULT - RANGE OF MOTION EXAMINATION, UPPER EXTREMITY
AROM of B shoulder flexion to ~45*, abduction ~60*/bilateral UE Active Assistive ROM was WFL  (within functional limits)

## 2021-09-03 NOTE — OCCUPATIONAL THERAPY INITIAL EVALUATION ADULT - LIVES WITH, PROFILE
lives with spouse in a private house +3 steps to enter, progressive decline over past several months prior to this pt was extremely active

## 2021-09-03 NOTE — PROGRESS NOTE ADULT - SUBJECTIVE AND OBJECTIVE BOX
KARLA LORENZ  61y  MRN: [id number]    Subjective: Patient is a 61y old  Male who presents with a chief complaint of Myositis (31 Aug 2021 09:28)       Interval history/overnight events:  MBS with oropharyngeal dysphagia, penetration noted with all consistencies of diet.     Assessed at bedside. Reports improved strength in extremities Walking while in room. Reports some night sweats overnight. Denies fever, chills. Discussed aspiration risk, patient expressed understanding and accepted risk of continuing on diet. No other complaints at this time.    MEDICATIONS  (STANDING):  aspirin enteric coated 81 milliGRAM(s) Oral daily  dextrose 40% Gel 15 Gram(s) Oral once  dextrose 5%. 1000 milliLiter(s) (50 mL/Hr) IV Continuous <Continuous>  dextrose 5%. 1000 milliLiter(s) (100 mL/Hr) IV Continuous <Continuous>  dextrose 50% Injectable 25 Gram(s) IV Push once  dextrose 50% Injectable 12.5 Gram(s) IV Push once  dextrose 50% Injectable 25 Gram(s) IV Push once  enoxaparin Injectable 40 milliGRAM(s) SubCutaneous daily  glucagon  Injectable 1 milliGRAM(s) IntraMuscular once  insulin glargine Injectable (LANTUS) 5 Unit(s) SubCutaneous at bedtime  insulin lispro (ADMELOG) corrective regimen sliding scale   SubCutaneous three times a day before meals  insulin lispro (ADMELOG) corrective regimen sliding scale   SubCutaneous at bedtime  multivitamin 1 Tablet(s) Oral daily  thiamine 100 milliGRAM(s) Oral daily    MEDICATIONS  (PRN):        Objective:    Vitals: Vital Signs Last 24 Hrs  T(C): 36.8 (21 @ 08:48), Max: 37.2 (21 @ 01:20)  T(F): 98.2 (21 @ 08:48), Max: 99 (21 @ 01:20)  HR: 99 (21 @ 08:48) (92 - 111)  BP: 91/56 (21 @ 08:48) (91/56 - 137/75)  BP(mean): --  RR: 17 (21 @ 08:48) (16 - 18)  SpO2: 96% (21 @ 08:48) (96% - 100%)                I&O's Summary      PHYSICAL EXAM:  GENERAL: NAD, slight temporal wasting  HEENT: PERRL, no scleral icterus, no head and neck lymphadenopathy  CHEST/LUNG: CTAB, no increased respiratory effort  HEART: Tachycardic, no murmurs appreciated  ABDOMEN: soft, nondistended, non-tender, normoactive BS  SKIN: Dry skin behind knees, from winter rash per patient  NERVOUS SYSTEM: Alert & Oriented X3, 3/5 strength in shoulder flexion, 5/5  strength, 3/5 strength hip flexion, 5/5 knee flexion/extension, 5/5 plantarflexion/dorsiflexion, sensation intact all throughout, 2+ patellar reflexes, 2+ biceps and 2+ brachioradialis reflex  EXT: no peripheral edema  PSYCH: calm and cooperative     LABS:        133<L>  |  97  |  11  ----------------------------<  139<H>  4.5   |  27  |  0.53      133<L>  |  94<L>  |  16  ----------------------------<  160<H>  4.6   |  27  |  0.54    Ca    8.8      31 Aug 2021 07:07  Ca    9.2      30 Aug 2021 18:26  Phos  4.0       Mg     2.0         TPro  6.3  /  Alb  2.4<L>  /  TBili  0.2  /  DBili  x   /  AST  111<H>  /  ALT  155<H>  /  AlkPhos  73    TPro  7.3  /  Alb  3.0<L>  /  TBili  0.2  /  DBili  x   /  AST  139<H>  /  ALT  192<H>  /  AlkPhos  81  08-30    PT/INR - ( 31 Aug 2021 07:13 )   PT: 14.2 sec;   INR: 1.19 ratio         PTT - ( 31 Aug 2021 07:13 )  PTT:27.4 sec              Urinalysis Basic - ( 30 Aug 2021 22:02 )    Color: Light Yellow / Appearance: Clear / S.009 / pH: x  Gluc: x / Ketone: Negative  / Bili: Negative / Urobili: Negative   Blood: x / Protein: Trace / Nitrite: Negative   Leuk Esterase: Negative / RBC: 1 /hpf / WBC 0 /HPF   Sq Epi: x / Non Sq Epi: 0 /hpf / Bacteria: Negative                              9.7    12.97 )-----------( 369      ( 31 Aug 2021 07:08 )             30.5                         10.4   17.23 )-----------( 408      ( 30 Aug 2021 18:26 )             33.4     CAPILLARY BLOOD GLUCOSE      POCT Blood Glucose.: 131 mg/dL (31 Aug 2021 07:24)  POCT Blood Glucose.: 97 mg/dL (31 Aug 2021 00:12)          RADIOLOGY & ADDITIONAL TESTS:            Imaging Personally Reviewed:  [ ] YES  [ ] NO    Consultants involved in case:   Consultant(s) Notes Reviewed:  [ ] YES  [ ] NO:   Care Discussed with Consultants/Other Providers [ ] YES  [ ] NO         KARLA LORENZ  61y  MRN: [id number]    Subjective: Patient is a 61y old  Male who presents with a chief complaint of Myositis (31 Aug 2021 09:28)       Interval history/overnight events:  No acute events overnight.      Reports feeling 'shaky this morning.' He is unsure if he feels less weak this morning. Notes his heart rate has slowed down since starting steroids. Tolerating soft diet, has weak cough to clear airway. No other complaints at this time.     Patient is medically cleared for muscle biopsy Monday. See chart note with RCRI=1 due to pre-op insulin use.     MEDICATIONS  (STANDING):  aspirin enteric coated 81 milliGRAM(s) Oral daily  dextrose 40% Gel 15 Gram(s) Oral once  dextrose 5%. 1000 milliLiter(s) (50 mL/Hr) IV Continuous <Continuous>  dextrose 5%. 1000 milliLiter(s) (100 mL/Hr) IV Continuous <Continuous>  dextrose 50% Injectable 25 Gram(s) IV Push once  dextrose 50% Injectable 12.5 Gram(s) IV Push once  dextrose 50% Injectable 25 Gram(s) IV Push once  enoxaparin Injectable 40 milliGRAM(s) SubCutaneous daily  glucagon  Injectable 1 milliGRAM(s) IntraMuscular once  insulin glargine Injectable (LANTUS) 5 Unit(s) SubCutaneous at bedtime  insulin lispro (ADMELOG) corrective regimen sliding scale   SubCutaneous three times a day before meals  insulin lispro (ADMELOG) corrective regimen sliding scale   SubCutaneous at bedtime  multivitamin 1 Tablet(s) Oral daily  thiamine 100 milliGRAM(s) Oral daily    MEDICATIONS  (PRN):        Objective:    Vitals: Vital Signs Last 24 Hrs  T(C): 36.8 (21 @ 08:48), Max: 37.2 (21 @ 01:20)  T(F): 98.2 (21 @ 08:48), Max: 99 (21 @ 01:20)  HR: 99 (21 @ 08:48) (92 - 111)  BP: 91/56 (21 @ 08:48) (91/56 - 137/75)  BP(mean): --  RR: 17 (21 @ 08:48) (16 - 18)  SpO2: 96% (21 @ 08:48) (96% - 100%)                I&O's Summary      PHYSICAL EXAM:  GENERAL: NAD, slight temporal wasting  HEENT: PERRL, no scleral icterus, no head and neck lymphadenopathy  CHEST/LUNG: CTAB, no increased respiratory effort  HEART: Tachycardic, no murmurs appreciated  ABDOMEN: soft, nondistended, non-tender, normoactive BS  SKIN: Dry skin behind knees, from winter rash per patient  NERVOUS SYSTEM: Alert & Oriented X3, 3/5 strength in shoulder flexion, 5/5  strength, 3/5 strength hip flexion, 5/5 knee flexion/extension, 5/5 plantarflexion/dorsiflexion, sensation intact all throughout, 2+ patellar reflexes, 2+ biceps and 2+ brachioradialis reflex  EXT: no peripheral edema  PSYCH: calm and cooperative     LABS:        133<L>  |  97  |  11  ----------------------------<  139<H>  4.5   |  27  |  0.53      133<L>  |  94<L>  |  16  ----------------------------<  160<H>  4.6   |  27  |  0.54    Ca    8.8      31 Aug 2021 07:07  Ca    9.2      30 Aug 2021 18:26  Phos  4.0       Mg     2.0         TPro  6.3  /  Alb  2.4<L>  /  TBili  0.2  /  DBili  x   /  AST  111<H>  /  ALT  155<H>  /  AlkPhos  73    TPro  7.3  /  Alb  3.0<L>  /  TBili  0.2  /  DBili  x   /  AST  139<H>  /  ALT  192<H>  /  AlkPhos  81  08-30    PT/INR - ( 31 Aug 2021 07:13 )   PT: 14.2 sec;   INR: 1.19 ratio         PTT - ( 31 Aug 2021 07:13 )  PTT:27.4 sec              Urinalysis Basic - ( 30 Aug 2021 22:02 )    Color: Light Yellow / Appearance: Clear / S.009 / pH: x  Gluc: x / Ketone: Negative  / Bili: Negative / Urobili: Negative   Blood: x / Protein: Trace / Nitrite: Negative   Leuk Esterase: Negative / RBC: 1 /hpf / WBC 0 /HPF   Sq Epi: x / Non Sq Epi: 0 /hpf / Bacteria: Negative                              9.7    12.97 )-----------( 369      ( 31 Aug 2021 07:08 )             30.5                         10.4   17.23 )-----------( 408      ( 30 Aug 2021 18:26 )             33.4     CAPILLARY BLOOD GLUCOSE      POCT Blood Glucose.: 131 mg/dL (31 Aug 2021 07:24)  POCT Blood Glucose.: 97 mg/dL (31 Aug 2021 00:12)          RADIOLOGY & ADDITIONAL TESTS:            Imaging Personally Reviewed:  [ ] YES  [ ] NO    Consultants involved in case:   Consultant(s) Notes Reviewed:  [ ] YES  [ ] NO:   Care Discussed with Consultants/Other Providers [ ] YES  [ ] NO

## 2021-09-03 NOTE — PROGRESS NOTE ADULT - ASSESSMENT
61 year old man with PMH DM2 presents with 5 month history of weight loss, proximal muscle weakness, likely neuromuscular dysphagia found to have very elevated troponin levels and normal EKG. Recent Echo and EGD mostly within normal limits. Outpatient labs with elevations in CK, troponin, aldolase, and CRP but normal TSH, SAMIA, antibodies for smith, RNP, centromere, scleroderma, and dsDNA. Overall picture favors inflammatory myopathy; without obvious rash favor polymyositis vs inclusion body myositis vs necrotizing autoimmune myositis. Also on the differential are malignancy, HIV, amyloidosis, TB, or chronic infection.  61 year old man with PMH DM2 presents with 5 month history of weight loss, proximal muscle weakness, likely neuromuscular dysphagia found to have very elevated troponin levels and normal EKG. Recent Echo and EGD mostly within normal limits. Outpatient labs with elevations in CK, troponin, aldolase, and CRP but normal TSH, SAMIA, antibodies for smith, RNP, centromere, scleroderma, and dsDNA. Overall picture favors inflammatory myopathy; without obvious rash favor polymyositis vs inclusion body myositis vs necrotizing autoimmune myositis.  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

## 2021-09-03 NOTE — PROGRESS NOTE ADULT - SUBJECTIVE AND OBJECTIVE BOX
Vascular Cardiology Consult Note    SERVICE CONSULT: [phone]        OFFICE [phone]    CC: TIJERINA / Muscle Weakness / Dysphagia    Interval Events:  Reports feeling improved on IV steroids.  Noted to ambulate the halls.  Reports improved proximal LE motor strength.  No C/P or SOB.  No LE pain or swelling.    Allergies  No Known Allergies    MEDICATIONS  (STANDING):  aspirin enteric coated 81 milliGRAM(s) Oral daily  cholecalciferol 1000 Unit(s) Oral daily  enoxaparin Injectable 40 milliGRAM(s) SubCutaneous daily  glucagon  Injectable 1 milliGRAM(s) IntraMuscular once  immune   globulin 10% (GAMMAGARD) IVPB 25 Gram(s) IV Intermittent daily  insulin glargine Injectable (LANTUS) 10 Unit(s) SubCutaneous at bedtime  insulin lispro (ADMELOG) corrective regimen sliding scale   SubCutaneous three times a day before meals  insulin lispro (ADMELOG) corrective regimen sliding scale   SubCutaneous at bedtime  insulin lispro Injectable (ADMELOG) 6 Unit(s) SubCutaneous three times a day before meals  lactated ringers. 1000 milliLiter(s) (75 mL/Hr) IV Continuous <Continuous>  multivitamin 1 Tablet(s) Oral daily  pantoprazole    Tablet 40 milliGRAM(s) Oral before breakfast  thiamine 100 milliGRAM(s) Oral daily  trimethoprim  160 mG/sulfamethoxazole 800 mG 1 Tablet(s) Oral daily    PAST MEDICAL & SURGICAL HISTORY:  Type 2 diabetes mellitus  History of esophagogastroduodenoscopy (EGD)    FAMILY HISTORY:  FH: leukemia (Father)    SOCIAL HISTORY:  unchanged    REVIEW OF SYSTEMS:  CONSTITUTIONAL: No fever  EYES: No eye pain  ENT:  No throat pain  NECK: No pain  RESPIRATORY: TIJERINA  CARDIOVASCULAR: No C/P   GASTROINTESTINAL: No abdominal pain  GENITOURINARY: No hematuria  NEUROLOGICAL: loss of strength  SKIN: rash to L UE  LYMPH Nodes: No enlarged glands noted  ENDOCRINE: No heat or cold intolerance noted  MUSCULOSKELETAL: No swelling or extremity pain  PSYCHIATRIC: No depression, anxiety  HEME/LYMPH: No  bleeding gums  ALLERGY AND IMMUNOLOGIC: No hives    [ x] All others negative	    PHYSICAL EXAM:  Vital Signs Last 24 Hrs  T(C): 36.3 (03 Sep 2021 11:15), Max: 36.8 (02 Sep 2021 20:22)  T(F): 97.4 (03 Sep 2021 11:15), Max: 98.2 (02 Sep 2021 20:22)  HR: 96 (03 Sep 2021 11:15) (63 - 96)  BP: 116/72 (03 Sep 2021 11:15) (102/60 - 127/78)  RR: 17 (03 Sep 2021 11:15) (16 - 17)  SpO2: 98% (03 Sep 2021 11:15) (96% - 99%)    Appearance:  	  HEENT:  NC/AT  Cardiovascular: RRR, S1 and S2   Respiratory: CTA B/L  Psychiatry:  AAO x 3  Gastrointestinal:  Soft, Non-tender, + BS	  Skin: No ecchymoses, No cyanosis	  Neurologic: No focal deficits noted  Extremities: No LE edema, B/L calves soft  Foot Exam: No tissue loss or ulceration  R Radial Site: C/D/I, Radial Pulse 2+    LABS:	 	                        10.3   12.65 )-----------( 408      ( 03 Sep 2021 11:11 )             32.6       09-03    134<L>  |  98  |  25<H>  ----------------------------<  379<H>  4.6   |  21<L>  |  0.57    Ca    9.4      03 Sep 2021 11:11  Phos  3.8     09-03  Mg     2.2     09-03    TPro  7.5  /  Alb  2.7<L>  /  TBili  0.1<L>  /  DBili  x   /  AST  70<H>  /  ALT  177<H>  /  AlkPhos  74  09-03      PT/INR - ( 03 Sep 2021 11:11 )   PT: 13.4 sec;   INR: 1.12 ratio         PTT - ( 03 Sep 2021 11:11 )  PTT:27.0 sec    CARDIAC MARKERS ( 02 Sep 2021 10:31 )  x     / x     / 2738 U/L / x     / 271.6 ng/mL Vascular Cardiology Consult Note    SERVICE CONSULT: [phone]        OFFICE [phone]    CC: TIJERINA / Muscle Weakness / Dysphagia    Interval Events:  Reports feeling improved on IV steroids.  Noted to ambulate the halls.  Reports improved proximal LE motor strength.  No C/P or SOB.  No LE pain or swelling.    Allergies  No Known Allergies    MEDICATIONS  (STANDING):  aspirin enteric coated 81 milliGRAM(s) Oral daily  cholecalciferol 1000 Unit(s) Oral daily  enoxaparin Injectable 40 milliGRAM(s) SubCutaneous daily  glucagon  Injectable 1 milliGRAM(s) IntraMuscular once  immune   globulin 10% (GAMMAGARD) IVPB 25 Gram(s) IV Intermittent daily  insulin glargine Injectable (LANTUS) 10 Unit(s) SubCutaneous at bedtime  insulin lispro (ADMELOG) corrective regimen sliding scale   SubCutaneous three times a day before meals  insulin lispro (ADMELOG) corrective regimen sliding scale   SubCutaneous at bedtime  insulin lispro Injectable (ADMELOG) 6 Unit(s) SubCutaneous three times a day before meals  lactated ringers. 1000 milliLiter(s) (75 mL/Hr) IV Continuous <Continuous>  multivitamin 1 Tablet(s) Oral daily  pantoprazole    Tablet 40 milliGRAM(s) Oral before breakfast  thiamine 100 milliGRAM(s) Oral daily  trimethoprim  160 mG/sulfamethoxazole 800 mG 1 Tablet(s) Oral daily    PAST MEDICAL & SURGICAL HISTORY:  Type 2 diabetes mellitus  History of esophagogastroduodenoscopy (EGD)    FAMILY HISTORY:  FH: leukemia (Father)    SOCIAL HISTORY:  unchanged    REVIEW OF SYSTEMS:  CONSTITUTIONAL: No fever  EYES: No eye pain  ENT:  No throat pain  NECK: No pain  RESPIRATORY: TIJERINA  CARDIOVASCULAR: No C/P   GASTROINTESTINAL: No abdominal pain  GENITOURINARY: No hematuria  NEUROLOGICAL: loss of strength  SKIN: rash to L UE  LYMPH Nodes: No enlarged glands noted  ENDOCRINE: No heat or cold intolerance noted  MUSCULOSKELETAL: No swelling or extremity pain  PSYCHIATRIC: No depression, anxiety  HEME/LYMPH: No  bleeding gums  ALLERGY AND IMMUNOLOGIC: No hives    [ x] All others negative	    PHYSICAL EXAM:  Vital Signs Last 24 Hrs  T(C): 36.3 (03 Sep 2021 11:15), Max: 36.8 (02 Sep 2021 20:22)  T(F): 97.4 (03 Sep 2021 11:15), Max: 98.2 (02 Sep 2021 20:22)  HR: 96 (03 Sep 2021 11:15) (63 - 96)  BP: 116/72 (03 Sep 2021 11:15) (102/60 - 127/78)  RR: 17 (03 Sep 2021 11:15) (16 - 17)  SpO2: 98% (03 Sep 2021 11:15) (96% - 99%)    Appearance:  	  HEENT:  NC/AT  Cardiovascular: RRR, S1 and S2   Respiratory: CTA B/L  Psychiatry:  AAO x 3  Gastrointestinal:  Soft, Non-tender, + BS	  Skin: No ecchymoses, No cyanosis	  Neurologic: No focal deficits noted  Extremities: No LE edema, B/L calves soft  Foot Exam: No tissue loss or ulceration  R Radial Site: C/D/I, Radial Pulse 2+    LABS:	 	                        10.3   12.65 )-----------( 408      ( 03 Sep 2021 11:11 )             32.6       09-03    134<L>  |  98  |  25<H>  ----------------------------<  379<H>  4.6   |  21<L>  |  0.57    Ca    9.4      03 Sep 2021 11:11  Phos  3.8     09-03  Mg     2.2     09-03    TPro  7.5  /  Alb  2.7<L>  /  TBili  0.1<L>  /  DBili  x   /  AST  70<H>  /  ALT  177<H>  /  AlkPhos  74  09-03      PT/INR - ( 03 Sep 2021 11:11 )   PT: 13.4 sec;   INR: 1.12 ratio         PTT - ( 03 Sep 2021 11:11 )  PTT:27.0 sec    CARDIAC MARKERS ( 02 Sep 2021 10:31 )  x     / x     / 2738 U/L / x     / 271.6 ng/mL

## 2021-09-03 NOTE — OCCUPATIONAL THERAPY INITIAL EVALUATION ADULT - MANUAL MUSCLE TESTING RESULTS, REHAB EVAL
B shoulder flexion and abduction 3-/5, B elbow flexion 5/5, B  5/5, BLE at least 3/5/grossly assessed due to

## 2021-09-03 NOTE — OCCUPATIONAL THERAPY INITIAL EVALUATION ADULT - PERTINENT HX OF CURRENT PROBLEM, REHAB EVAL
62 yo man with PMH DM2 presents to the hospital with subacute weakness, weight loss, dysphagia and admitted for troponemia. Patient reports gradual onset of dysphagia starting in 4/2021 (reports getting pfizer series in 3/2021) that was to both solids and liquids. He reports that he could only tolerate very small sips of water or bites of food and that he has an intermittent sensation of lump in his throat

## 2021-09-03 NOTE — PROGRESS NOTE ADULT - PROBLEM SELECTOR PLAN 5
Reports taking 10-12 U of daily insulin as well as janumet. reports that A1c had been poorly controlled in the past. Most recent on outpatient paperwork was 8.1. it is possible that patient's dysphagia is related to gastroparesis although less likely.  - A1C 7.2  -lantus 8 qhs, ademlog 4 mealtime  -ISS Reports taking 10-12 U of daily insulin as well as janumet. reports that A1c had been poorly controlled in the past. Most recent on outpatient paperwork was 8.1. it is possible that patient's dysphagia is related to gastroparesis although less likely.  - A1C 7.2  - tritrating insulin with solumedrol  -lantus 10 qhs, ademlog 8 mealtime  -ISS

## 2021-09-04 DIAGNOSIS — M33.20 POLYMYOSITIS, ORGAN INVOLVEMENT UNSPECIFIED: ICD-10-CM

## 2021-09-04 LAB
ALBUMIN SERPL ELPH-MCNC: 2.6 G/DL — LOW (ref 3.3–5)
ALP SERPL-CCNC: 70 U/L — SIGNIFICANT CHANGE UP (ref 40–120)
ALT FLD-CCNC: 182 U/L — HIGH (ref 10–45)
ANION GAP SERPL CALC-SCNC: 19 MMOL/L — HIGH (ref 5–17)
ANION GAP SERPL CALC-SCNC: 9 MMOL/L — SIGNIFICANT CHANGE UP (ref 5–17)
AST SERPL-CCNC: 91 U/L — HIGH (ref 10–40)
BILIRUB SERPL-MCNC: <0.1 MG/DL — LOW (ref 0.2–1.2)
BUN SERPL-MCNC: 29 MG/DL — HIGH (ref 7–23)
BUN SERPL-MCNC: 30 MG/DL — HIGH (ref 7–23)
CALCIUM SERPL-MCNC: 9.4 MG/DL — SIGNIFICANT CHANGE UP (ref 8.4–10.5)
CALCIUM SERPL-MCNC: <3 (ref 8.4–10.5)
CHLORIDE SERPL-SCNC: 101 MMOL/L — SIGNIFICANT CHANGE UP (ref 96–108)
CHLORIDE SERPL-SCNC: 95 MMOL/L — LOW (ref 96–108)
CK SERPL-CCNC: 2220 U/L — HIGH (ref 30–200)
CO2 SERPL-SCNC: 19 MMOL/L — LOW (ref 22–31)
CO2 SERPL-SCNC: 26 MMOL/L — SIGNIFICANT CHANGE UP (ref 22–31)
CREAT SERPL-MCNC: 0.64 MG/DL — SIGNIFICANT CHANGE UP (ref 0.5–1.3)
CREAT SERPL-MCNC: 0.72 MG/DL — SIGNIFICANT CHANGE UP (ref 0.5–1.3)
GLUCOSE BLDC GLUCOMTR-MCNC: 103 MG/DL — HIGH (ref 70–99)
GLUCOSE BLDC GLUCOMTR-MCNC: 152 MG/DL — HIGH (ref 70–99)
GLUCOSE BLDC GLUCOMTR-MCNC: 177 MG/DL — HIGH (ref 70–99)
GLUCOSE BLDC GLUCOMTR-MCNC: 195 MG/DL — HIGH (ref 70–99)
GLUCOSE BLDC GLUCOMTR-MCNC: 253 MG/DL — HIGH (ref 70–99)
GLUCOSE SERPL-MCNC: 166 MG/DL — HIGH (ref 70–99)
GLUCOSE SERPL-MCNC: 270 MG/DL — HIGH (ref 70–99)
HCT VFR BLD CALC: 35.9 % — LOW (ref 39–50)
HGB BLD-MCNC: 11.3 G/DL — LOW (ref 13–17)
MAGNESIUM SERPL-MCNC: 2.2 MG/DL — SIGNIFICANT CHANGE UP (ref 1.6–2.6)
MAGNESIUM SERPL-MCNC: <.6 MG/DL — CRITICAL LOW (ref 1.6–2.6)
MCHC RBC-ENTMCNC: 27 PG — SIGNIFICANT CHANGE UP (ref 27–34)
MCHC RBC-ENTMCNC: 31.5 GM/DL — LOW (ref 32–36)
MCV RBC AUTO: 85.7 FL — SIGNIFICANT CHANGE UP (ref 80–100)
NRBC # BLD: 0 /100 WBCS — SIGNIFICANT CHANGE UP (ref 0–0)
PHOSPHATE SERPL-MCNC: 3.3 MG/DL — SIGNIFICANT CHANGE UP (ref 2.5–4.5)
PHOSPHATE SERPL-MCNC: 4 MG/DL — SIGNIFICANT CHANGE UP (ref 2.5–4.5)
PLATELET # BLD AUTO: 369 K/UL — SIGNIFICANT CHANGE UP (ref 150–400)
POTASSIUM SERPL-MCNC: 5 MMOL/L — SIGNIFICANT CHANGE UP (ref 3.5–5.3)
POTASSIUM SERPL-MCNC: >9 MMOL/L — CRITICAL HIGH (ref 3.5–5.3)
POTASSIUM SERPL-SCNC: 5 MMOL/L — SIGNIFICANT CHANGE UP (ref 3.5–5.3)
POTASSIUM SERPL-SCNC: >9 MMOL/L — CRITICAL HIGH (ref 3.5–5.3)
PROT SERPL-MCNC: 6.8 G/DL — SIGNIFICANT CHANGE UP (ref 6–8.3)
RBC # BLD: 4.19 M/UL — LOW (ref 4.2–5.8)
RBC # FLD: 12.7 % — SIGNIFICANT CHANGE UP (ref 10.3–14.5)
SODIUM SERPL-SCNC: 133 MMOL/L — LOW (ref 135–145)
SODIUM SERPL-SCNC: 136 MMOL/L — SIGNIFICANT CHANGE UP (ref 135–145)
WBC # BLD: 11.57 K/UL — HIGH (ref 3.8–10.5)
WBC # FLD AUTO: 11.57 K/UL — HIGH (ref 3.8–10.5)

## 2021-09-04 PROCEDURE — 99233 SBSQ HOSP IP/OBS HIGH 50: CPT | Mod: GC

## 2021-09-04 RX ORDER — ACETAMINOPHEN 500 MG
650 TABLET ORAL EVERY 6 HOURS
Refills: 0 | Status: DISCONTINUED | OUTPATIENT
Start: 2021-09-04 | End: 2021-09-04

## 2021-09-04 RX ORDER — ACETAMINOPHEN 500 MG
650 TABLET ORAL EVERY 6 HOURS
Refills: 0 | Status: DISCONTINUED | OUTPATIENT
Start: 2021-09-04 | End: 2021-09-09

## 2021-09-04 RX ADMIN — Medication 50 MILLIGRAM(S): at 21:47

## 2021-09-04 RX ADMIN — INSULIN GLARGINE 10 UNIT(S): 100 INJECTION, SOLUTION SUBCUTANEOUS at 21:53

## 2021-09-04 RX ADMIN — Medication 650 MILLIGRAM(S): at 21:46

## 2021-09-04 RX ADMIN — Medication 4 UNIT(S): at 16:43

## 2021-09-04 RX ADMIN — Medication 1 TABLET(S): at 12:14

## 2021-09-04 RX ADMIN — Medication 81 MILLIGRAM(S): at 12:14

## 2021-09-04 RX ADMIN — Medication 4 UNIT(S): at 07:56

## 2021-09-04 RX ADMIN — Medication 2: at 16:43

## 2021-09-04 RX ADMIN — ENOXAPARIN SODIUM 40 MILLIGRAM(S): 100 INJECTION SUBCUTANEOUS at 12:12

## 2021-09-04 RX ADMIN — PANTOPRAZOLE SODIUM 40 MILLIGRAM(S): 20 TABLET, DELAYED RELEASE ORAL at 07:16

## 2021-09-04 RX ADMIN — Medication 60 MILLIGRAM(S): at 07:12

## 2021-09-04 RX ADMIN — IMMUNE GLOBULIN (HUMAN) 83.33 GRAM(S): 10 INJECTION INTRAVENOUS; SUBCUTANEOUS at 22:12

## 2021-09-04 RX ADMIN — Medication 6: at 12:11

## 2021-09-04 RX ADMIN — Medication 4 UNIT(S): at 12:12

## 2021-09-04 RX ADMIN — Medication 100 MILLIGRAM(S): at 12:14

## 2021-09-04 RX ADMIN — Medication 2: at 07:56

## 2021-09-04 RX ADMIN — Medication 1000 UNIT(S): at 12:14

## 2021-09-04 NOTE — PROGRESS NOTE ADULT - PROBLEM SELECTOR PLAN 1
Patients presentation of subacute-chronic weight loss, fatigue, proximal muscle weakness, (neuromuscular) dysphagia, and elevated troponins, CK, aldolase, CRP, and transaminases favor inflammatory myopathy. Without rash dermatomyositis is less likely. W/o any joint pain. Patient was prescribed statins upon discharge from [facility] but he did not report taking them and symptoms began prior to that point. DDx includes PM, IBM, NM. Will repeat TSH although was 3.4 as outpatient.   - CT c/a/p without masses or evidence of malignancy  - confied by MRI of left thigh, findings consistent with polymyositis  -rheumatology following    - solumedrol pulse dose x3days (last today), started on IVIG x5 days (finish 9/6)    - Bactrim started for PCP ppx, Protonix started for GI ppx    - f/u lab panels  - muscle biopsy with general surgery Monday    - NPO Sunday, hold DVT ppx, will need stat covid Patients presentation of subacute-chronic weight loss, fatigue, proximal muscle weakness, (neuromuscular) dysphagia, and elevated troponins, CK, aldolase, CRP, and transaminases favor inflammatory myopathy. Without rash dermatomyositis is less likely. W/o any joint pain. Patient was prescribed statins upon discharge from [facility] but he did not report taking them and symptoms began prior to that point.   - CT c/a/p without masses or evidence of malignancy  - confirmed by MRI of left thigh, findings consistent with polymyositis  -rheumatology following    - s/p solumedrol x3, c/w IVIG x5 days (finish 9/6)    - Bactrim started for PCP ppx, Protonix started for GI ppx    - f/u lab panels  - muscle biopsy with general surgery Tuesday 10:30am    - NPO Monday, hold DVT ppx, will need stat covid

## 2021-09-04 NOTE — PROGRESS NOTE ADULT - PROBLEM SELECTOR PLAN 5
Reports taking 10-12 U of daily insulin as well as janumet. reports that A1c had been poorly controlled in the past. Most recent on outpatient paperwork was 8.1. it is possible that patient's dysphagia is related to gastroparesis although less likely.  - A1C 7.2  - tritrating insulin with solumedrol  -lantus 10 qhs, ademlog 8 mealtime  -ISS Reports taking 10-12 U of daily insulin as well as janumet. reports that A1c had been poorly controlled in the past. Most recent on outpatient paperwork was 8.1. it is possible that patient's dysphagia is related to gastroparesis although less likely.  - A1C 7.2  - s/p solumedrol, titrate insulin  -lantus 10 qhs, ademlog 4 mealtime  -ISS

## 2021-09-04 NOTE — PROGRESS NOTE ADULT - PROBLEM SELECTOR PLAN 8
Lovenox    Soft diet Lovenox    Soft diet    Patient requesting colonoscopy, amenable to outpatient follow up

## 2021-09-04 NOTE — PROGRESS NOTE ADULT - SUBJECTIVE AND OBJECTIVE BOX
KARLA LORENZ  61y  MRN: [id number]    Subjective: Patient is a 61y old  Male who presents with a chief complaint of Myositis (31 Aug 2021 09:28)       Interval history/overnight events:  No acute events overnight.    Reports feeling stronger today. Reports improved ability to cough. He had tylenol suspension yesterday before IVIG which made him cough and left burning sensation in throat. Improved today. Able to stand up and walk independently. No other complaints at this time.     Patient is medically cleared for muscle biopsy Monday. See chart note with RCRI=1 due to pre-op insulin use.     MEDICATIONS  (STANDING):  aspirin enteric coated 81 milliGRAM(s) Oral daily  dextrose 40% Gel 15 Gram(s) Oral once  dextrose 5%. 1000 milliLiter(s) (50 mL/Hr) IV Continuous <Continuous>  dextrose 5%. 1000 milliLiter(s) (100 mL/Hr) IV Continuous <Continuous>  dextrose 50% Injectable 25 Gram(s) IV Push once  dextrose 50% Injectable 12.5 Gram(s) IV Push once  dextrose 50% Injectable 25 Gram(s) IV Push once  enoxaparin Injectable 40 milliGRAM(s) SubCutaneous daily  glucagon  Injectable 1 milliGRAM(s) IntraMuscular once  insulin glargine Injectable (LANTUS) 5 Unit(s) SubCutaneous at bedtime  insulin lispro (ADMELOG) corrective regimen sliding scale   SubCutaneous three times a day before meals  insulin lispro (ADMELOG) corrective regimen sliding scale   SubCutaneous at bedtime  multivitamin 1 Tablet(s) Oral daily  thiamine 100 milliGRAM(s) Oral daily    MEDICATIONS  (PRN):        Objective:    Vitals: Vital Signs Last 24 Hrs  T(C): 36.8 (21 @ 08:48), Max: 37.2 (21 @ 01:20)  T(F): 98.2 (21 @ 08:48), Max: 99 (21 @ 01:20)  HR: 99 (21 @ 08:48) (92 - 111)  BP: 91/56 (21 @ 08:48) (91/56 - 137/75)  BP(mean): --  RR: 17 (21 @ 08:48) (16 - 18)  SpO2: 96% (08-31-21 @ 08:48) (96% - 100%)                I&O's Summary      PHYSICAL EXAM:  GENERAL: NAD, slight temporal wasting  HEENT: PERRL, no scleral icterus, no head and neck lymphadenopathy  CHEST/LUNG: CTAB, no increased respiratory effort  HEART: Tachycardic, no murmurs appreciated  ABDOMEN: soft, nondistended, non-tender, normoactive BS  SKIN: Dry skin behind knees, from winter rash per patient  NERVOUS SYSTEM: Alert & Oriented X3, 3/5 strength in shoulder flexion, 5/5  strength, 3/5 strength hip flexion, 5/5 knee flexion/extension, 5/5 plantarflexion/dorsiflexion, sensation intact all throughout, 2+ patellar reflexes, 2+ biceps and 2+ brachioradialis reflex  EXT: no peripheral edema  PSYCH: calm and cooperative     LABS:        133<L>  |  97  |  11  ----------------------------<  139<H>  4.5   |  27  |  0.53      133<L>  |  94<L>  |  16  ----------------------------<  160<H>  4.6   |  27  |  0.54    Ca    8.8      31 Aug 2021 07:07  Ca    9.2      30 Aug 2021 18:26  Phos  4.0       Mg     2.0         TPro  6.3  /  Alb  2.4<L>  /  TBili  0.2  /  DBili  x   /  AST  111<H>  /  ALT  155<H>  /  AlkPhos  73    TPro  7.3  /  Alb  3.0<L>  /  TBili  0.2  /  DBili  x   /  AST  139<H>  /  ALT  192<H>  /  AlkPhos  81  08-30    PT/INR - ( 31 Aug 2021 07:13 )   PT: 14.2 sec;   INR: 1.19 ratio         PTT - ( 31 Aug 2021 07:13 )  PTT:27.4 sec              Urinalysis Basic - ( 30 Aug 2021 22:02 )    Color: Light Yellow / Appearance: Clear / S.009 / pH: x  Gluc: x / Ketone: Negative  / Bili: Negative / Urobili: Negative   Blood: x / Protein: Trace / Nitrite: Negative   Leuk Esterase: Negative / RBC: 1 /hpf / WBC 0 /HPF   Sq Epi: x / Non Sq Epi: 0 /hpf / Bacteria: Negative                              9.7    12.97 )-----------( 369      ( 31 Aug 2021 07:08 )             30.5                         10.4   17.23 )-----------( 408      ( 30 Aug 2021 18:26 )             33.4     CAPILLARY BLOOD GLUCOSE      POCT Blood Glucose.: 131 mg/dL (31 Aug 2021 07:24)  POCT Blood Glucose.: 97 mg/dL (31 Aug 2021 00:12)          RADIOLOGY & ADDITIONAL TESTS:            Imaging Personally Reviewed:  [ ] YES  [ ] NO    Consultants involved in case:   Consultant(s) Notes Reviewed:  [ ] YES  [ ] NO:   Care Discussed with Consultants/Other Providers [ ] YES  [ ] NO         KARLA LORENZ  61y  MRN: [id number]    Subjective: Patient is a 61y old  Male who presents with a chief complaint of Myositis (31 Aug 2021 09:28)       Interval history/overnight events:  No acute events overnight.    Reports feeling stronger today. Reports improved ability to cough. He had tylenol suspension yesterday before IVIG which made him cough and left burning sensation in throat. Improved today. Able to stand up and walk independently. No other complaints at this time.     Patient is medically cleared for muscle biopsy Monday. See chart note with RCRI=1 due to pre-op insulin use.     MEDICATIONS  (STANDING):  aspirin enteric coated 81 milliGRAM(s) Oral daily  dextrose 40% Gel 15 Gram(s) Oral once  dextrose 5%. 1000 milliLiter(s) (50 mL/Hr) IV Continuous <Continuous>  dextrose 5%. 1000 milliLiter(s) (100 mL/Hr) IV Continuous <Continuous>  dextrose 50% Injectable 25 Gram(s) IV Push once  dextrose 50% Injectable 12.5 Gram(s) IV Push once  dextrose 50% Injectable 25 Gram(s) IV Push once  enoxaparin Injectable 40 milliGRAM(s) SubCutaneous daily  glucagon  Injectable 1 milliGRAM(s) IntraMuscular once  insulin glargine Injectable (LANTUS) 5 Unit(s) SubCutaneous at bedtime  insulin lispro (ADMELOG) corrective regimen sliding scale   SubCutaneous three times a day before meals  insulin lispro (ADMELOG) corrective regimen sliding scale   SubCutaneous at bedtime  multivitamin 1 Tablet(s) Oral daily  thiamine 100 milliGRAM(s) Oral daily    MEDICATIONS  (PRN):        Objective:    Vitals: Vital Signs Last 24 Hrs  T(C): 36.8 (21 @ 08:48), Max: 37.2 (21 @ 01:20)  T(F): 98.2 (21 @ 08:48), Max: 99 (21 @ 01:20)  HR: 99 (21 @ 08:48) (92 - 111)  BP: 91/56 (21 @ 08:48) (91/56 - 137/75)  BP(mean): --  RR: 17 (21 @ 08:48) (16 - 18)  SpO2: 96% (08-31-21 @ 08:48) (96% - 100%)                I&O's Summary      PHYSICAL EXAM:  GENERAL: NAD, slight temporal wasting  HEENT: PERRL, no scleral icterus, no head and neck lymphadenopathy  CHEST/LUNG: CTAB, no increased respiratory effort  HEART: Tachycardic, no murmurs appreciated  ABDOMEN: soft, nondistended, non-tender, normoactive BS  SKIN: Dry skin behind knees, from winter rash per patient  NERVOUS SYSTEM: Alert & Oriented X3, 3/5 strength in shoulder flexion, 5/5  strength, improved 4/5 strength hip flexion, 5/5 knee flexion/extension, 5/5 plantarflexion/dorsiflexion, sensation intact all throughout, 2+ patellar reflexes, 2+ biceps and 2+ brachioradialis reflex  EXT: no peripheral edema  PSYCH: calm and cooperative     LABS:        133<L>  |  97  |  11  ----------------------------<  139<H>  4.5   |  27  |  0.53      133<L>  |  94<L>  |  16  ----------------------------<  160<H>  4.6   |  27  |  0.54    Ca    8.8      31 Aug 2021 07:07  Ca    9.2      30 Aug 2021 18:26  Phos  4.0       Mg     2.0         TPro  6.3  /  Alb  2.4<L>  /  TBili  0.2  /  DBili  x   /  AST  111<H>  /  ALT  155<H>  /  AlkPhos  73    TPro  7.3  /  Alb  3.0<L>  /  TBili  0.2  /  DBili  x   /  AST  139<H>  /  ALT  192<H>  /  AlkPhos  81  08-30    PT/INR - ( 31 Aug 2021 07:13 )   PT: 14.2 sec;   INR: 1.19 ratio         PTT - ( 31 Aug 2021 07:13 )  PTT:27.4 sec              Urinalysis Basic - ( 30 Aug 2021 22:02 )    Color: Light Yellow / Appearance: Clear / S.009 / pH: x  Gluc: x / Ketone: Negative  / Bili: Negative / Urobili: Negative   Blood: x / Protein: Trace / Nitrite: Negative   Leuk Esterase: Negative / RBC: 1 /hpf / WBC 0 /HPF   Sq Epi: x / Non Sq Epi: 0 /hpf / Bacteria: Negative                              9.7    12.97 )-----------( 369      ( 31 Aug 2021 07:08 )             30.5                         10.4   17.23 )-----------( 408      ( 30 Aug 2021 18:26 )             33.4     CAPILLARY BLOOD GLUCOSE      POCT Blood Glucose.: 131 mg/dL (31 Aug 2021 07:24)  POCT Blood Glucose.: 97 mg/dL (31 Aug 2021 00:12)          RADIOLOGY & ADDITIONAL TESTS:            Imaging Personally Reviewed:  [ ] YES  [ ] NO    Consultants involved in case:   Consultant(s) Notes Reviewed:  [ ] YES  [ ] NO:   Care Discussed with Consultants/Other Providers [ ] YES  [ ] NO

## 2021-09-04 NOTE — PROGRESS NOTE ADULT - PROBLEM SELECTOR PLAN 3
Normal MCV. Suspect AoCD.  - Hgb stable 10.3 from 11.1  - LDH elevated, haptoglobin and bili wnl  - b12/folate wnl  - iron 8% sat, serum iron 13, TIBC decreased, ferritin elevated 704 from 538  - continue to monitor

## 2021-09-04 NOTE — PROGRESS NOTE ADULT - ASSESSMENT
61 year old man with PMH DM2 presents with 5 month history of weight loss, proximal muscle weakness, likely neuromuscular dysphagia found to have very elevated troponin levels and normal EKG. Recent Echo and EGD mostly within normal limits. Outpatient labs with elevations in CK, troponin, aldolase, and CRP but normal TSH, SAMIA, antibodies for smith, RNP, centromere, scleroderma, and dsDNA. Overall picture favors inflammatory myopathy; without obvious rash favor polymyositis vs inclusion body myositis vs necrotizing autoimmune myositis.  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

## 2021-09-04 NOTE — PROGRESS NOTE ADULT - PROBLEM SELECTOR PLAN 2
troponin elevated on admission with subsequent downtrend 1022->991. EKg with sinus tachycardia and without evidence of ischemia. patient denies exertional (or any) chest pain dyspnea, orthopnea. BNP wnl. recent echo with LVEF 70%, normal RVSF, and no valvular abnormalities per discharge paperwork. Very low suspicion for ACS.  -suspect elevation in the setting of myositis vs myocarditis  - ECHO with hyperdynamic EF=75-80%, mild diastolic dysfunction grade I  - LHC w/ nonobstructive CAD  - c/w low dose aspirin  - continue to hold statins in the setting of myopathy and elevated AST/ALT  - c/w telemetry monitoring  - cMRI Tuesday 9/7 to investigate myocarditis (COVID vaccine one month ago)

## 2021-09-04 NOTE — PROGRESS NOTE ADULT - PROBLEM SELECTOR PLAN 7
patient has 40lb weight loss in 5 months.  -nutrition consult  -multivitamin/thiamine  -watch for refeeding syndrome patient has 40lb weight loss in 5 months.  -nutrition consult  -multivitamin/thiamine  -watch for refeeding syndrome, Mg and P wnl

## 2021-09-05 LAB
ALBUMIN SERPL ELPH-MCNC: 2.7 G/DL — LOW (ref 3.3–5)
ALP SERPL-CCNC: 64 U/L — SIGNIFICANT CHANGE UP (ref 40–120)
ALT FLD-CCNC: 193 U/L — HIGH (ref 10–45)
ANION GAP SERPL CALC-SCNC: 11 MMOL/L — SIGNIFICANT CHANGE UP (ref 5–17)
AST SERPL-CCNC: 131 U/L — HIGH (ref 10–40)
BILIRUB SERPL-MCNC: 0.1 MG/DL — LOW (ref 0.2–1.2)
BUN SERPL-MCNC: 29 MG/DL — HIGH (ref 7–23)
CALCIUM SERPL-MCNC: 9.5 MG/DL — SIGNIFICANT CHANGE UP (ref 8.4–10.5)
CHLORIDE SERPL-SCNC: 101 MMOL/L — SIGNIFICANT CHANGE UP (ref 96–108)
CO2 SERPL-SCNC: 24 MMOL/L — SIGNIFICANT CHANGE UP (ref 22–31)
CREAT SERPL-MCNC: 0.61 MG/DL — SIGNIFICANT CHANGE UP (ref 0.5–1.3)
GLUCOSE BLDC GLUCOMTR-MCNC: 108 MG/DL — HIGH (ref 70–99)
GLUCOSE BLDC GLUCOMTR-MCNC: 114 MG/DL — HIGH (ref 70–99)
GLUCOSE BLDC GLUCOMTR-MCNC: 230 MG/DL — HIGH (ref 70–99)
GLUCOSE BLDC GLUCOMTR-MCNC: 79 MG/DL — SIGNIFICANT CHANGE UP (ref 70–99)
GLUCOSE SERPL-MCNC: 111 MG/DL — HIGH (ref 70–99)
HCT VFR BLD CALC: 36.3 % — LOW (ref 39–50)
HGB BLD-MCNC: 11.2 G/DL — LOW (ref 13–17)
MAGNESIUM SERPL-MCNC: 2.2 MG/DL — SIGNIFICANT CHANGE UP (ref 1.6–2.6)
MCHC RBC-ENTMCNC: 26.9 PG — LOW (ref 27–34)
MCHC RBC-ENTMCNC: 30.9 GM/DL — LOW (ref 32–36)
MCV RBC AUTO: 87.3 FL — SIGNIFICANT CHANGE UP (ref 80–100)
NRBC # BLD: 0 /100 WBCS — SIGNIFICANT CHANGE UP (ref 0–0)
PHOSPHATE SERPL-MCNC: 3.9 MG/DL — SIGNIFICANT CHANGE UP (ref 2.5–4.5)
PLATELET # BLD AUTO: 389 K/UL — SIGNIFICANT CHANGE UP (ref 150–400)
POTASSIUM SERPL-MCNC: 5 MMOL/L — SIGNIFICANT CHANGE UP (ref 3.5–5.3)
POTASSIUM SERPL-SCNC: 5 MMOL/L — SIGNIFICANT CHANGE UP (ref 3.5–5.3)
PROT SERPL-MCNC: 7.3 G/DL — SIGNIFICANT CHANGE UP (ref 6–8.3)
RBC # BLD: 4.16 M/UL — LOW (ref 4.2–5.8)
RBC # FLD: 13.2 % — SIGNIFICANT CHANGE UP (ref 10.3–14.5)
SODIUM SERPL-SCNC: 136 MMOL/L — SIGNIFICANT CHANGE UP (ref 135–145)
WBC # BLD: 10.06 K/UL — SIGNIFICANT CHANGE UP (ref 3.8–10.5)
WBC # FLD AUTO: 10.06 K/UL — SIGNIFICANT CHANGE UP (ref 3.8–10.5)

## 2021-09-05 PROCEDURE — 99233 SBSQ HOSP IP/OBS HIGH 50: CPT | Mod: GC

## 2021-09-05 RX ORDER — INSULIN LISPRO 100/ML
VIAL (ML) SUBCUTANEOUS
Refills: 0 | Status: DISCONTINUED | OUTPATIENT
Start: 2021-09-05 | End: 2021-09-09

## 2021-09-05 RX ORDER — INSULIN LISPRO 100/ML
VIAL (ML) SUBCUTANEOUS AT BEDTIME
Refills: 0 | Status: DISCONTINUED | OUTPATIENT
Start: 2021-09-05 | End: 2021-09-09

## 2021-09-05 RX ORDER — INSULIN GLARGINE 100 [IU]/ML
6 INJECTION, SOLUTION SUBCUTANEOUS AT BEDTIME
Refills: 0 | Status: DISCONTINUED | OUTPATIENT
Start: 2021-09-05 | End: 2021-09-09

## 2021-09-05 RX ADMIN — ENOXAPARIN SODIUM 40 MILLIGRAM(S): 100 INJECTION SUBCUTANEOUS at 12:11

## 2021-09-05 RX ADMIN — Medication 1 TABLET(S): at 12:11

## 2021-09-05 RX ADMIN — Medication 650 MILLIGRAM(S): at 21:53

## 2021-09-05 RX ADMIN — PANTOPRAZOLE SODIUM 40 MILLIGRAM(S): 20 TABLET, DELAYED RELEASE ORAL at 06:42

## 2021-09-05 RX ADMIN — Medication 4 UNIT(S): at 08:35

## 2021-09-05 RX ADMIN — Medication 1000 UNIT(S): at 12:11

## 2021-09-05 RX ADMIN — Medication 4 UNIT(S): at 12:11

## 2021-09-05 RX ADMIN — Medication 100 MILLIGRAM(S): at 12:11

## 2021-09-05 RX ADMIN — INSULIN GLARGINE 6 UNIT(S): 100 INJECTION, SOLUTION SUBCUTANEOUS at 21:53

## 2021-09-05 RX ADMIN — Medication 60 MILLIGRAM(S): at 06:41

## 2021-09-05 RX ADMIN — Medication 81 MILLIGRAM(S): at 12:12

## 2021-09-05 RX ADMIN — Medication 4 UNIT(S): at 17:37

## 2021-09-05 RX ADMIN — IMMUNE GLOBULIN (HUMAN) 83.33 GRAM(S): 10 INJECTION INTRAVENOUS; SUBCUTANEOUS at 22:04

## 2021-09-05 RX ADMIN — Medication 50 MILLIGRAM(S): at 21:53

## 2021-09-05 NOTE — PROGRESS NOTE ADULT - PROBLEM SELECTOR PLAN 4
To solids and liquids and without significant symptoms of dyspepsia but with occasional regurgitation. No issue with initiating swallow but does have cough. favor esophageal > oropharyngeal dysphagia. s/p normal outpatient EGD.  Failed bedside swallow eval.  - likely neuromuscular in etiology  - oropharyngeal dysphagia per MBS, S&S recommended keep NPO with replacement hydration  - patient refused NGT or PEG, insists on diet despite knowing risk of aspiration  - if aspiration PNA suspected, start zosyn

## 2021-09-05 NOTE — PROGRESS NOTE ADULT - SUBJECTIVE AND OBJECTIVE BOX
KARLA LORENZ  61y  MRN: [id number]    Subjective: Patient is a 61y old  Male who presents with a chief complaint of Myositis (31 Aug 2021 09:28)       Interval history/overnight events:  No acute events overnight.    Reports feeling strong today. Reports feeling more confident in swallowing. Ate all of breakfast. Still difficulty with shoulder strength, but able to stand up and walk on his own. No other complaints at this time.     MEDICATIONS  (STANDING):  aspirin enteric coated 81 milliGRAM(s) Oral daily  dextrose 40% Gel 15 Gram(s) Oral once  dextrose 5%. 1000 milliLiter(s) (50 mL/Hr) IV Continuous <Continuous>  dextrose 5%. 1000 milliLiter(s) (100 mL/Hr) IV Continuous <Continuous>  dextrose 50% Injectable 25 Gram(s) IV Push once  dextrose 50% Injectable 12.5 Gram(s) IV Push once  dextrose 50% Injectable 25 Gram(s) IV Push once  enoxaparin Injectable 40 milliGRAM(s) SubCutaneous daily  glucagon  Injectable 1 milliGRAM(s) IntraMuscular once  insulin glargine Injectable (LANTUS) 5 Unit(s) SubCutaneous at bedtime  insulin lispro (ADMELOG) corrective regimen sliding scale   SubCutaneous three times a day before meals  insulin lispro (ADMELOG) corrective regimen sliding scale   SubCutaneous at bedtime  multivitamin 1 Tablet(s) Oral daily  thiamine 100 milliGRAM(s) Oral daily    MEDICATIONS  (PRN):        Objective:    Vitals: Vital Signs Last 24 Hrs  T(C): 36.8 (21 @ 08:48), Max: 37.2 (21 @ 01:20)  T(F): 98.2 (21 @ 08:48), Max: 99 (21 @ 01:20)  HR: 99 (21 @ 08:48) (92 - 111)  BP: 91/56 (21 @ 08:48) (91/56 - 137/75)  BP(mean): --  RR: 17 (21 @ 08:48) (16 - 18)  SpO2: 96% (21 @ 08:48) (96% - 100%)                I&O's Summary      PHYSICAL EXAM:  GENERAL: NAD, slight temporal wasting  HEENT: PERRL, no scleral icterus, no head and neck lymphadenopathy  CHEST/LUNG: CTAB, no increased respiratory effort  HEART: Tachycardic, no murmurs appreciated  ABDOMEN: soft, nondistended, non-tender, normoactive BS  SKIN: Dry skin behind knees, from winter rash per patient  NERVOUS SYSTEM: Alert & Oriented X3, 3/5 strength in shoulder flexion, 5/5  strength, improved 4/5 strength hip flexion, 5/5 knee flexion/extension, 5/5 plantarflexion/dorsiflexion, sensation intact all throughout, 2+ patellar reflexes, 2+ biceps and 2+ brachioradialis reflex  EXT: no peripheral edema  PSYCH: calm and cooperative     LABS:        133<L>  |  97  |  11  ----------------------------<  139<H>  4.5   |  27  |  0.53      133<L>  |  94<L>  |  16  ----------------------------<  160<H>  4.6   |  27  |  0.54    Ca    8.8      31 Aug 2021 07:07  Ca    9.2      30 Aug 2021 18:26  Phos  4.0       Mg     2.0         TPro  6.3  /  Alb  2.4<L>  /  TBili  0.2  /  DBili  x   /  AST  111<H>  /  ALT  155<H>  /  AlkPhos  73    TPro  7.3  /  Alb  3.0<L>  /  TBili  0.2  /  DBili  x   /  AST  139<H>  /  ALT  192<H>  /  AlkPhos  81  08-30    PT/INR - ( 31 Aug 2021 07:13 )   PT: 14.2 sec;   INR: 1.19 ratio         PTT - ( 31 Aug 2021 07:13 )  PTT:27.4 sec              Urinalysis Basic - ( 30 Aug 2021 22:02 )    Color: Light Yellow / Appearance: Clear / S.009 / pH: x  Gluc: x / Ketone: Negative  / Bili: Negative / Urobili: Negative   Blood: x / Protein: Trace / Nitrite: Negative   Leuk Esterase: Negative / RBC: 1 /hpf / WBC 0 /HPF   Sq Epi: x / Non Sq Epi: 0 /hpf / Bacteria: Negative                              9.7    12.97 )-----------( 369      ( 31 Aug 2021 07:08 )             30.5                         10.4   17.23 )-----------( 408      ( 30 Aug 2021 18:26 )             33.4     CAPILLARY BLOOD GLUCOSE      POCT Blood Glucose.: 131 mg/dL (31 Aug 2021 07:24)  POCT Blood Glucose.: 97 mg/dL (31 Aug 2021 00:12)          RADIOLOGY & ADDITIONAL TESTS:            Imaging Personally Reviewed:  [ ] YES  [ ] NO    Consultants involved in case:   Consultant(s) Notes Reviewed:  [ ] YES  [ ] NO:   Care Discussed with Consultants/Other Providers [ ] YES  [ ] NO

## 2021-09-05 NOTE — PROGRESS NOTE ADULT - PROBLEM SELECTOR PLAN 5
Reports taking 10-12 U of daily insulin as well as janumet. reports that A1c had been poorly controlled in the past. Most recent on outpatient paperwork was 8.1. it is possible that patient's dysphagia is related to gastroparesis although less likely.  - A1C 7.2  - s/p pulse dose steroids, now on IV solumedrol 60 daily, titrate insulin  -lantus 6 qhs, ademlog 4 mealtime  -low dose ISS

## 2021-09-05 NOTE — PROGRESS NOTE ADULT - PROBLEM SELECTOR PLAN 3
Normal MCV. Suspect AoCD.  - Hgb stable 11.3  - LDH elevated, haptoglobin and bili wnl  - b12/folate wnl  - iron 8% sat, serum iron 13, TIBC decreased, ferritin elevated 704 from 538  - continue to monitor

## 2021-09-05 NOTE — PROGRESS NOTE ADULT - PROBLEM SELECTOR PLAN 8
Lovenox    Soft diet    Patient requesting colonoscopy, amenable to outpatient follow up. Make sure GI aware of outpatient follow up.

## 2021-09-05 NOTE — PROGRESS NOTE ADULT - PROBLEM SELECTOR PLAN 1
Patients presentation of subacute-chronic weight loss, fatigue, proximal muscle weakness, (neuromuscular) dysphagia, and elevated troponins, CK, aldolase, CRP, and transaminases favor inflammatory myopathy. Without rash dermatomyositis is less likely. W/o any joint pain. Patient was prescribed statins upon discharge from [facility] but he did not report taking them and symptoms began prior to that point.   - CT c/a/p without masses or evidence of malignancy  - confirmed by MRI of left thigh, findings consistent with polymyositis  -rheumatology following    - s/p pulse dose solumedrol x3, now solumedrol IV 60 daily, c/w IVIG x5 days (finish 9/6)    - Bactrim started for PCP ppx, Protonix started for GI ppx    - f/u lab panels  - muscle biopsy with general surgery Tuesday 10:30am    - NPO Monday, hold DVT ppx, will need stat covid

## 2021-09-05 NOTE — PROGRESS NOTE ADULT - PROBLEM SELECTOR PLAN 6
Had colonic thickening on CT at OSH. planned for colonoscopy but held off due to elevated troponins and request for LHC prior to C-scope. per report, the thickening could have been due to underdistension.  -given current differential patient would likely benefit from malignancy workup anyway  - No evidence of malignancy on CT chest/abdo/pelvis   - GI following for dysphagia and colonic thickening, signed off at this time. Reach out about outpatient colonoscopy.

## 2021-09-05 NOTE — PROGRESS NOTE ADULT - TIME BILLING
coordinating care with consultants and team
Patient history and exam and communication. Chart review

## 2021-09-05 NOTE — PROGRESS NOTE ADULT - PROBLEM SELECTOR PLAN 7
patient has 40lb weight loss in 5 months.  -nutrition consult  -multivitamin/thiamine  -watch for refeeding syndrome, Mg and P wnl

## 2021-09-05 NOTE — PROGRESS NOTE ADULT - PROBLEM SELECTOR PLAN 2
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 Troponins began downtrending significantly with steroids.   -suspect elevation in the setting of myositis vs myocarditis  - ECHO with hyperdynamic EF=75-80%, mild diastolic dysfunction grade I  - LHC w/ nonobstructive CAD  - c/w low dose aspirin  - continue to hold statins in the setting of myopathy and elevated AST/ALT  - c/w telemetry monitoring, NSR  - cMRI Tuesday 9/7 to investigate myocarditis (COVID vaccine one month ago)

## 2021-09-06 ENCOUNTER — TRANSCRIPTION ENCOUNTER (OUTPATIENT)
Age: 61
End: 2021-09-06

## 2021-09-06 DIAGNOSIS — Z01.818 ENCOUNTER FOR OTHER PREPROCEDURAL EXAMINATION: ICD-10-CM

## 2021-09-06 LAB
ALBUMIN SERPL ELPH-MCNC: 2.4 G/DL — LOW (ref 3.3–5)
ALDOLASE SERPL-CCNC: 40.2 U/L — HIGH (ref 3.3–10.3)
ALP SERPL-CCNC: 65 U/L — SIGNIFICANT CHANGE UP (ref 40–120)
ALT FLD-CCNC: 189 U/L — HIGH (ref 10–45)
ANION GAP SERPL CALC-SCNC: 11 MMOL/L — SIGNIFICANT CHANGE UP (ref 5–17)
APTT BLD: 28.4 SEC — SIGNIFICANT CHANGE UP (ref 27.5–35.5)
AST SERPL-CCNC: 144 U/L — HIGH (ref 10–40)
BILIRUB SERPL-MCNC: 0.3 MG/DL — SIGNIFICANT CHANGE UP (ref 0.2–1.2)
BLD GP AB SCN SERPL QL: NEGATIVE — SIGNIFICANT CHANGE UP
BUN SERPL-MCNC: 25 MG/DL — HIGH (ref 7–23)
CALCIUM SERPL-MCNC: 9.3 MG/DL — SIGNIFICANT CHANGE UP (ref 8.4–10.5)
CHLORIDE SERPL-SCNC: 103 MMOL/L — SIGNIFICANT CHANGE UP (ref 96–108)
CO2 SERPL-SCNC: 19 MMOL/L — LOW (ref 22–31)
CREAT SERPL-MCNC: 0.61 MG/DL — SIGNIFICANT CHANGE UP (ref 0.5–1.3)
GLUCOSE BLDC GLUCOMTR-MCNC: 115 MG/DL — HIGH (ref 70–99)
GLUCOSE BLDC GLUCOMTR-MCNC: 146 MG/DL — HIGH (ref 70–99)
GLUCOSE BLDC GLUCOMTR-MCNC: 203 MG/DL — HIGH (ref 70–99)
GLUCOSE BLDC GLUCOMTR-MCNC: 250 MG/DL — HIGH (ref 70–99)
GLUCOSE SERPL-MCNC: 69 MG/DL — LOW (ref 70–99)
HCT VFR BLD CALC: 34.2 % — LOW (ref 39–50)
HGB BLD-MCNC: 10.8 G/DL — LOW (ref 13–17)
INR BLD: 0.97 RATIO — SIGNIFICANT CHANGE UP (ref 0.88–1.16)
MAGNESIUM SERPL-MCNC: 2.1 MG/DL — SIGNIFICANT CHANGE UP (ref 1.6–2.6)
MCHC RBC-ENTMCNC: 27.6 PG — SIGNIFICANT CHANGE UP (ref 27–34)
MCHC RBC-ENTMCNC: 31.6 GM/DL — LOW (ref 32–36)
MCV RBC AUTO: 87.5 FL — SIGNIFICANT CHANGE UP (ref 80–100)
NRBC # BLD: 0 /100 WBCS — SIGNIFICANT CHANGE UP (ref 0–0)
PHOSPHATE SERPL-MCNC: 3.8 MG/DL — SIGNIFICANT CHANGE UP (ref 2.5–4.5)
PLATELET # BLD AUTO: 335 K/UL — SIGNIFICANT CHANGE UP (ref 150–400)
POTASSIUM SERPL-MCNC: 4.3 MMOL/L — SIGNIFICANT CHANGE UP (ref 3.5–5.3)
POTASSIUM SERPL-SCNC: 4.3 MMOL/L — SIGNIFICANT CHANGE UP (ref 3.5–5.3)
PROT SERPL-MCNC: 7.7 G/DL — SIGNIFICANT CHANGE UP (ref 6–8.3)
PROTHROM AB SERPL-ACNC: 11.7 SEC — SIGNIFICANT CHANGE UP (ref 10.6–13.6)
RBC # BLD: 3.91 M/UL — LOW (ref 4.2–5.8)
RBC # FLD: 13.3 % — SIGNIFICANT CHANGE UP (ref 10.3–14.5)
RH IG SCN BLD-IMP: POSITIVE — SIGNIFICANT CHANGE UP
SARS-COV-2 RNA SPEC QL NAA+PROBE: SIGNIFICANT CHANGE UP
SODIUM SERPL-SCNC: 133 MMOL/L — LOW (ref 135–145)
WBC # BLD: 10.68 K/UL — HIGH (ref 3.8–10.5)
WBC # FLD AUTO: 10.68 K/UL — HIGH (ref 3.8–10.5)

## 2021-09-06 PROCEDURE — 99233 SBSQ HOSP IP/OBS HIGH 50: CPT | Mod: GC

## 2021-09-06 PROCEDURE — 71045 X-RAY EXAM CHEST 1 VIEW: CPT | Mod: 26

## 2021-09-06 PROCEDURE — 93010 ELECTROCARDIOGRAM REPORT: CPT

## 2021-09-06 RX ADMIN — Medication 650 MILLIGRAM(S): at 22:43

## 2021-09-06 RX ADMIN — Medication 81 MILLIGRAM(S): at 11:52

## 2021-09-06 RX ADMIN — Medication 1 TABLET(S): at 11:52

## 2021-09-06 RX ADMIN — Medication 1000 UNIT(S): at 11:52

## 2021-09-06 RX ADMIN — Medication 50 MILLIGRAM(S): at 21:47

## 2021-09-06 RX ADMIN — Medication 60 MILLIGRAM(S): at 05:43

## 2021-09-06 RX ADMIN — Medication 2: at 12:44

## 2021-09-06 RX ADMIN — Medication 650 MILLIGRAM(S): at 23:13

## 2021-09-06 RX ADMIN — Medication 100 MILLIGRAM(S): at 11:52

## 2021-09-06 RX ADMIN — Medication 4 UNIT(S): at 12:15

## 2021-09-06 RX ADMIN — Medication 4 UNIT(S): at 08:30

## 2021-09-06 RX ADMIN — PANTOPRAZOLE SODIUM 40 MILLIGRAM(S): 20 TABLET, DELAYED RELEASE ORAL at 06:06

## 2021-09-06 RX ADMIN — Medication 4 UNIT(S): at 16:54

## 2021-09-06 RX ADMIN — Medication 2: at 16:55

## 2021-09-06 RX ADMIN — IMMUNE GLOBULIN (HUMAN) 83.33 GRAM(S): 10 INJECTION INTRAVENOUS; SUBCUTANEOUS at 22:28

## 2021-09-06 RX ADMIN — ENOXAPARIN SODIUM 40 MILLIGRAM(S): 100 INJECTION SUBCUTANEOUS at 11:52

## 2021-09-06 NOTE — DISCHARGE NOTE PROVIDER - DETAILS OF MALNUTRITION DIAGNOSIS/DIAGNOSES
This patient has been assessed with a concern for Malnutrition and was treated during this hospitalization for the following Nutrition diagnosis/diagnoses:     -  09/02/2021: Severe protein-calorie malnutrition

## 2021-09-06 NOTE — PROGRESS NOTE ADULT - SUBJECTIVE AND OBJECTIVE BOX
ATP Surgery Progress Note    SUBJECTIVE:  No acute events  Still feels weak in proximal LE  No chest pain or shortness of breath    OBJECTIVE:  Vital Signs Last 24 Hrs  T(C): 36.5 (06 Sep 2021 04:11), Max: 36.6 (05 Sep 2021 20:26)  T(F): 97.7 (06 Sep 2021 04:11), Max: 97.9 (05 Sep 2021 20:26)  HR: 88 (06 Sep 2021 04:11) (76 - 97)  BP: 122/73 (06 Sep 2021 04:11) (98/59 - 133/74)  BP(mean): --  RR: 18 (06 Sep 2021 04:11) (18 - 18)  SpO2: 99% (06 Sep 2021 04:11) (96% - 99%)    Physical Examination:  GEN: NAD, resting quietly  NEURO: AAOx3, CN II-XII grossly intact, no focal deficits  PULM: symmetric chest rise bilaterally, no increased WOB  EXTR: no LE erythema, moving all extremities  VASC: LE warm and well-perfused    I&O's Detail    05 Sep 2021 07:01  -  06 Sep 2021 07:00  --------------------------------------------------------  IN:    Oral Fluid: 690 mL  Total IN: 690 mL    OUT:    Voided (mL): 510 mL  Total OUT: 510 mL    Total NET: 180 mL            LABS:                        10.8   10.68 )-----------( 335      ( 06 Sep 2021 07:14 )             34.2       09-06    133<L>  |  103  |  25<H>  ----------------------------<  69<L>  4.3   |  19<L>  |  0.61    Ca    9.3      06 Sep 2021 07:14  Phos  3.8     09-06  Mg     2.1     09-06    TPro  7.7  /  Alb  2.4<L>  /  TBili  0.3  /  DBili  x   /  AST  144<H>  /  ALT  189<H>  /  AlkPhos  65  09-06        IMAGING:  []

## 2021-09-06 NOTE — PROGRESS NOTE ADULT - PROBLEM SELECTOR PLAN 5
Reports taking 10-12 U of daily insulin as well as janumet. reports that A1c had been poorly controlled in the past. Most recent on outpatient paperwork was 8.1. it is possible that patient's dysphagia is related to gastroparesis although less likely.  - A1C 7.2  - s/p pulse dose steroids, now on IV solumedrol 60 daily, titrate insulin  -lantus 6 qhs, ademlog 4 mealtime  -low dose ISS Reports taking 10-12 U of daily insulin as well as janumet. reports that A1c had been poorly controlled in the past. Most recent on outpatient paperwork was 8.1. it is possible that patient's dysphagia is related to gastroparesis although less likely.  - A1C 7.2  - s/p pulse dose steroids, now on IV solumedrol 60 daily, titrate insulin  - lantus 6 qhs, ademlog 4 mealtime  - low dose ISS  - hold basal insulin for NPO

## 2021-09-06 NOTE — PROGRESS NOTE ADULT - PROBLEM SELECTOR PLAN 8
Lovenox    Soft diet    Patient requesting colonoscopy, amenable to outpatient follow up. Make sure GI aware of outpatient follow up. DVT ppx: Lovenox  Diet: Soft diet

## 2021-09-06 NOTE — CHART NOTE - NSCHARTNOTEFT_GEN_A_CORE
Preop Dx: Possible Polymyositis  Surgeon: [facility]IGI attending, Dr. Prasad  Procedure: Left thigh muscle bx    Vital Signs Last 24 Hrs  T(C): 36.5 (06 Sep 2021 04:11), Max: 36.6 (05 Sep 2021 20:26)  T(F): 97.7 (06 Sep 2021 04:11), Max: 97.9 (05 Sep 2021 20:26)  HR: 88 (06 Sep 2021 04:11) (76 - 97)  BP: 122/73 (06 Sep 2021 04:11) (98/59 - 133/74)  BP(mean): --  RR: 18 (06 Sep 2021 04:11) (18 - 18)  SpO2: 99% (06 Sep 2021 04:11) (96% - 99%)                        10.8   10.68 )-----------( 335      ( 06 Sep 2021 07:14 )             34.2     09-06    133<L>  |  103  |  25<H>  ----------------------------<  69<L>  4.3   |  19<L>  |  0.61    Ca    9.3      06 Sep 2021 07:14  Phos  3.8     09-06  Mg     2.1     09-06    TPro  7.7  /  Alb  2.4<L>  /  TBili  0.3  /  DBili  x   /  AST  144<H>  /  ALT  189<H>  /  AlkPhos  65  09-06    PT/INR - ( 06 Sep 2021 07:29 )   PT: 11.7 sec;   INR: 0.97 ratio         PTT - ( 06 Sep 2021 07:29 )  PTT:28.4 sec  Daily     Daily     EKG: in chart  CXR: in chart  Type and Screen: from 9/6  COVID: from admission 8/30        A/P: 61y Male w pmh diabetes, now with possible polymyositis, seen on MRI    - OR 9/6/2021 for left thigh muscle biopsy with Dr. Prasad   - NPO past midnight, except medications  - IVF while NPO  - Please continue dvt ppx  - Cardiology optimization noted, please document medicine optimization  - Consent signed and in chart    Vinod Randolph  PGY-2  ATP  [id number]

## 2021-09-06 NOTE — DISCHARGE NOTE PROVIDER - NSDCFUADDAPPT_GEN_ALL_CORE_FT
Please follow up with Dr. Sousa (Rheumatology) on Monday 9/13 at 8:30 AM for a DEXA scan. Address is [address] [place] [address]. Phone number is [phone].    Please follow up with your primary care doctor within 1-2 weeks of discharge. Please follow up with Dr. Sousa (Rheumatology) on Monday 9/13 at 8:30 AM for a DEXA scan. Address is [address] [place] [address]. Phone number is [phone].  We gave you a one week supply of your prescriptions. Please refill any necessary prescriptions at this appointment.    Please follow up with your primary care doctor within 1-2 weeks of discharge. Please follow up with Dr. Sousa (Rheumatology) on Monday 9/13 at 8:30 AM for a DEXA scan. Address is [address] [place] [address]. Phone number is [phone].  We gave you a one week supply of your prescriptions. Please refill any necessary prescriptions at this appointment.    Please follow up with your primary care doctor within 1-2 weeks of discharge. If you do not have a primary care provider, you can call the number listed under [facility] [place] to find a PCP at the resident-run clinic. You can also call  Please follow up with Dr. Sousa (Rheumatology) on Monday 9/13 at 8:30 AM for a DEXA scan. Address is [address] [place] [address]. Phone number is [phone].  We gave you a one week supply of your prescriptions. Please refill any necessary prescriptions at this appointment.    Please follow up with your primary care doctor within 1-2 weeks of discharge. If you do not have a primary care provider, you can call the number listed under [facility] [place] to find a PCP at the resident-run clinic. You can also call [facility] to find a primary care provider if needed.      Please also follow-up closely with the primary care provider for management of your diabetes after starting the steroids during your hospitalization.

## 2021-09-06 NOTE — DISCHARGE NOTE PROVIDER - HOSPITAL COURSE
Dexter Monroy is a 61 year old man with PMHx DM2 presents with 5 month history of weight loss, proximal muscle weakness, and likely neuromuscular dysphagia. Recently been admitted to [facility] with similar complaints. Outpatient labs with elevations in CK, troponin, aldolase, and CRP but normal TSH, SAMIA, antibodies for smith, RNP, centromere, scleroderma, and dsDNA. Echo and EGD mostly within normal limits from that admission. In the ED, vitals were stable except for mild tachycardia. Labs significant for very elevated troponin 1022 with normal EKG. Received 1L LR in the ED. Admitted to medicine for further management.       For proximal muscle weakness, inflammatory myopathy was suspected due to serum markers for inflammation being elevated. CT abdomen/pelvis ruled out obvious malignancy. Infectious workup was negative. Rheumatology was consulted. MRI left thigh findings consistent with polymyositis. Received 3 days pulse dose steroids, then continued with methylpred 60 daily. Started PCP ppx with Bactrim and GI ppx with Protonix. Received 5 days IVIG. Patient received muscle biopsy which showed ____________.     For elevated troponins, cardiology was consulted. Vascular cardiology following. ECHO showed hyperdynamic EF=75-80%. Patient received a left heart catheterization which showed non-obstructive CAD. Had cardiac MRI to [facility] for myocarditis, which showed _____________. Troponins began downtrending once steroids were started, evidence that diagnosis is likely type of inflammatory myopathy.     For dysphagia, esophagram normal. Started on regular diet. GI and Speech&Swallow consulted. S&S consulted ENT, who thought dysphagia likely due to laryngopharyngeal reflux. No intervention from GI. Patient failed modified barium swallow while inpatient, appeared as oropharyngeal dysphagia. Speech&Swallow recommended alternative feeding methods, such as fluid replacement, NGT, or PEG placement. Physicians also discussed alternative options with patient. Patient refused, understood and accepted risk of aspiration. However, he preferred to continue eating soft diet. Patient reported swallowing felt stronger throughout hospitalization. Patient is 60 y/o and never had colonoscopy. Patient will follow up with GI outpatient for colonoscopy on ___________.     A1c 7.2 on admission. On detemir and Janumet outpatient. Managed with insulin while inpatient. Titrated insulin with steroids per Rheumatology. Sugars were well controlled. Continued/Started _________ at discharge.       PT evaluated patient and recommended home with outpatient PT. Patient will need follow up with rheumatology.            Dexter Monroy is a 61 year old man with PMHx DM2 presents with 5 month history of weight loss, proximal muscle weakness, and likely neuromuscular dysphagia. Recently been admitted to [facility] with similar complaints. Outpatient labs with elevations in CK, troponin, aldolase, and CRP but normal TSH, SAMIA, antibodies for smith, RNP, centromere, scleroderma, and dsDNA. Echo and EGD mostly within normal limits from that admission. In the ED, vitals were stable except for mild tachycardia. Labs significant for very elevated troponin 1022 with normal EKG. Received 1L LR in the ED. Admitted to medicine for further management.       For proximal muscle weakness, inflammatory myopathy was suspected due to serum markers for inflammation being elevated. CT abdomen/pelvis ruled out obvious malignancy. Infectious workup was negative. Rheumatology was consulted. MRI left thigh findings consistent with polymyositis. Received 3 days pulse dose steroids, then continued with methylpred 60 daily. Started PCP ppx with Bactrim and GI ppx with Protonix. Received 5 days IVIG. Patient received muscle biopsy and will follow up outpatient for results.    For elevated troponins, cardiology was consulted. Vascular cardiology following. ECHO showed hyperdynamic EF=75-80%. Patient received a left heart catheterization which showed non-obstructive CAD. [facility] which showed EF 84% and no abnormalities. Troponins began downtrending once steroids were started, evidence that diagnosis is likely type of inflammatory myopathy.     For dysphagia, esophagram normal. Started on regular diet. GI and Speech&Swallow consulted. S&S consulted ENT, who thought dysphagia likely due to laryngopharyngeal reflux. No intervention from GI. Patient failed modified barium swallow while inpatient, appeared as oropharyngeal dysphagia. Speech&Swallow recommended alternative feeding methods, such as fluid replacement, NGT, or PEG placement. Physicians also discussed alternative options with patient. Patient refused, understood and accepted risk of aspiration. However, he preferred to continue eating soft diet. Patient reported swallowing felt stronger throughout hospitalization. Patient is 60 y/o and never had colonoscopy. Patient will follow up with GI outpatient for colonoscopy.    A1c 7.2 on admission. On detemir and Janumet outpatient. Managed with insulin while inpatient. Titrated insulin with steroids per Rheumatology. Sugars were well controlled. Continue insulin and discontinue Janumet at discharge.       PT evaluated patient and recommended home with outpatient PT. Patient will need follow up with rheumatology.            Dexter Monroy is a 61 year old man with PMHx DM2 presents with 5 month history of weight loss, proximal muscle weakness, and likely neuromuscular dysphagia. Recently been admitted to [facility] with similar complaints. Outpatient labs with elevations in CK, troponin, aldolase, and CRP but normal TSH, SAMIA, antibodies for smith, RNP, centromere, scleroderma, and dsDNA. Echo and EGD mostly within normal limits from that admission. In the ED, vitals were stable except for mild tachycardia. Labs significant for very elevated troponin 1022 with normal EKG. Received 1L LR in the ED. Admitted to medicine for further management.       For proximal muscle weakness, inflammatory myopathy was suspected due to serum markers for inflammation being elevated. CT abdomen/pelvis ruled out obvious malignancy. Infectious workup was negative. Rheumatology was consulted. MRI left thigh findings consistent with polymyositis. Received 3 days pulse dose steroids, then continued with methylpred 60 daily. Started PCP ppx with Bactrim and GI ppx with Protonix. Received 5 days IVIG. Patient received muscle biopsy and will follow up outpatient for results.    For elevated troponins, cardiology was consulted. Vascular cardiology following. ECHO showed hyperdynamic EF=75-80%. Patient received a left heart catheterization which showed non-obstructive CAD. Had cardiac MRI to [facility] for myocarditis, which showed EF 84% and no abnormalities. Troponins began downtrending once steroids were started, evidence that diagnosis is likely type of inflammatory myopathy.     For dysphagia, esophagram normal. Started on regular diet. GI and Speech&Swallow consulted. S&S consulted ENT, who thought dysphagia likely due to laryngopharyngeal reflux. No intervention from GI. Patient failed modified barium swallow while inpatient, appeared as oropharyngeal dysphagia. Speech&Swallow recommended alternative feeding methods, such as fluid replacement, NGT, or PEG placement. Physicians also discussed alternative options with patient. Patient refused, understood and accepted risk of aspiration. However, he preferred to continue eating soft diet. Patient reported swallowing felt stronger throughout hospitalization. Patient is 62 y/o and never had colonoscopy. Patient will follow up with GI outpatient for colonoscopy.    A1c 7.2 on admission. On detemir and Janumet outpatient. Managed with insulin while inpatient. Titrated insulin with steroids per Rheumatology. Sugars were well controlled. Continue insulin and discontinue Janumet at discharge.       PT evaluated patient and recommended home with outpatient PT. Patient will need follow up with rheumatology. Patient is hemodynamically stable and medically cleared for discharge.

## 2021-09-06 NOTE — PROGRESS NOTE ADULT - ASSESSMENT
ASSESSMENT: Patient is a 61y old m with DM2, p/w subacute proximal muscle weakness, weight loss, dysphagia and troponemia. Concern for polymyositis and cardiomyositis. Surgery consulted for muscle biopsy.    PLAN:   - Surgery team to perform muscle biopsy in OR, left thigh per MRI read  - Booked for OR Tuesday 9/7 @ 9:00am  - Cardiology clearance noted, please document medical risk stratification/optimization  - Please send preop labs - Type and screen within 3 days, cbc, bmp with mg and ph in the am, repeat STAT COVID on 9/6, CXR and EKG this admission  - NPO at midnight 9/7 (late Monday into early Tuesday)  - Care per primary team    Vinod Dickson  PGY-2  ATP  [id number]

## 2021-09-06 NOTE — PROGRESS NOTE ADULT - PROBLEM SELECTOR PLAN 6
Had colonic thickening on CT at OSH. planned for colonoscopy but held off due to elevated troponins and request for LHC prior to C-scope. per report, the thickening could have been due to underdistension.  -given current differential patient would likely benefit from malignancy workup anyway  - No evidence of malignancy on CT chest/abdo/pelvis   - GI following for dysphagia and colonic thickening, signed off at this time. Reach out about outpatient colonoscopy. Had colonic thickening on CT at OSH. planned for colonoscopy but held off due to elevated troponins and request for LHC prior to C-scope. per report, the thickening could have been due to underdistension.  -given current differential patient would likely benefit from malignancy workup anyway  - No evidence of malignancy on CT chest/abdo/pelvis   - GI following for dysphagia and colonic thickening, signed off at this time. Outpatient colonoscopy.

## 2021-09-06 NOTE — DISCHARGE NOTE PROVIDER - NSDCMRMEDTOKEN_GEN_ALL_CORE_FT
Aspir 81 oral delayed release tablet: 1 tab(s) orally once a day  insulin detemir 100 units/mL subcutaneous solution: 10 unit(s) subcutaneous once a day (at bedtime)  Janumet 50 mg-500 mg oral tablet: 1 tab(s) orally 2 times a day   Aspir 81 oral delayed release tablet: 1 tab(s) orally once a day  insulin detemir 100 units/mL subcutaneous solution: 10 unit(s) subcutaneous once a day (at bedtime)   Aspir 81 oral delayed release tablet: 1 tab(s) orally once a day  cholecalciferol oral tablet: 1000 unit(s) orally once a day  insulin detemir 100 units/mL subcutaneous solution: 10 unit(s) subcutaneous once a day (at bedtime)  methylPREDNISolone 8 mg oral tablet: 3 tab(s) orally 2 times a day  Multiple Vitamins oral tablet: 1 tab(s) orally once a day  pantoprazole 40 mg oral delayed release tablet: 1 tab(s) orally once a day (before a meal)  sulfamethoxazole-trimethoprim 800 mg-160 mg oral tablet: 1 tab(s) orally once a day   acetaminophen 500 mg oral tablet: 2 tab(s) orally every 6 hours MDD:4 grams  Advil 200 mg oral tablet: 2 tab(s) orally every 8 hours MDD:2.4 grams  Aspir 81 oral delayed release tablet: 1 tab(s) orally once a day  cholecalciferol oral tablet: 1000 unit(s) orally once a day  insulin detemir 100 units/mL subcutaneous solution: 10 unit(s) subcutaneous once a day (at bedtime)  methylPREDNISolone 8 mg oral tablet: 3 tab(s) orally 2 times a day  Multiple Vitamins oral tablet: 1 tab(s) orally once a day  pantoprazole 40 mg oral delayed release tablet: 1 tab(s) orally once a day (before a meal)  sulfamethoxazole-trimethoprim 800 mg-160 mg oral tablet: 1 tab(s) orally once a day

## 2021-09-06 NOTE — DISCHARGE NOTE PROVIDER - NSDCCPTREATMENT_GEN_ALL_CORE_FT
PRINCIPAL PROCEDURE  Procedure: Left heart catheterization  Findings and Treatment: Done due to elevated troponin with normal EKG. Findings with non-obstructive CAD.      SECONDARY PROCEDURE  Procedure: Muscle biopsy, superficial  Findings and Treatment: Suspected inflammatory myopathy.     PRINCIPAL PROCEDURE  Procedure: Left heart catheterization  Findings and Treatment: Done due to elevated troponin with normal EKG. Findings with non-obstructive CAD.      SECONDARY PROCEDURE  Procedure: Muscle biopsy, superficial  Findings and Treatment: Suspected inflammatory myopathy. Results are still pending. Please follow-up with Dr. Sousa on results of the muscle biopsy if they come back.

## 2021-09-06 NOTE — DISCHARGE NOTE PROVIDER - NSDCCPCAREPLAN_GEN_ALL_CORE_FT
PRINCIPAL DISCHARGE DIAGNOSIS  Diagnosis: Inflammatory myopathy  Assessment and Plan of Treatment: You were admitted with a 5 month history of muscle weakness, trouble swallowing, and 40lbs of weight loss. You were suspected to have an autoimmune condition where you have inflammation in your muscles. Rheumatology was consulted. You received steroids and IVIG with improvement in your strength. You had a muscle biopsy which showed ___________. There was concern for a heart attack, so you had a left heart catheterization which showed no cardiac cause for your condition. You had a cardiac MRI which showed ___________. You will follow up with Rheumatology as an outpatient for continued management. You will continue working with physical therapy as an outpatient.      SECONDARY DISCHARGE DIAGNOSES  Diagnosis: Dysphagia  Assessment and Plan of Treatment: You were admitted with a 5 month history of muscle weakness, trouble swallowing, and 40lbs of weight loss. You were evaluated by the GI team and Speech & Swallow (S&S) team. You had an EGD at an outside hospital which showed no GI cause for your trouble swallowing. You failed a modified barium swallow with S&S while inpatient and were recommended to do alternative methods. You understood and accepted risk of aspiration with continuing to eat normal diet. Your swallowing strength improved with steroids while inpatient. You will follow up with Rheumatology as an outpatient.     PRINCIPAL DISCHARGE DIAGNOSIS  Diagnosis: Inflammatory myopathy  Assessment and Plan of Treatment: You were admitted with a 5 month history of muscle weakness, trouble swallowing, and 40lbs of weight loss. You were suspected to have an autoimmune condition where you have inflammation in your muscles. Rheumatology was consulted. You received steroids and IVIG with improvement in your strength. You had a muscle biopsy which you can follow up the results of outpatient. There was concern for a heart attack, so you had a left heart catheterization which showed no cardiac cause for your condition. You had a cardiac MRI which showed a normal ejection fraction and heart muscle. You will follow up with Rheumatology as an outpatient for continued management. You will continue working with physical therapy as an outpatient.      SECONDARY DISCHARGE DIAGNOSES  Diagnosis: Dysphagia  Assessment and Plan of Treatment: You were admitted with a 5 month history of muscle weakness, trouble swallowing, and 40lbs of weight loss. You were evaluated by the GI team and Speech & Swallow (S&S) team. You had an EGD at an outside hospital which showed no GI cause for your trouble swallowing. You failed a modified barium swallow with S&S while inpatient and were recommended to do alternative methods. You understood and accepted risk of aspiration with continuing to eat normal diet. Your swallowing strength improved with steroids while inpatient. You will follow up with Rheumatology as an outpatient. You will also follow up with GI outpatient for a colonoscopy.     PRINCIPAL DISCHARGE DIAGNOSIS  Diagnosis: Inflammatory myopathy  Assessment and Plan of Treatment: You were admitted with a 5 month history of muscle weakness, trouble swallowing, and 40lbs of weight loss. You were suspected to have an autoimmune condition where you have inflammation in your muscles. Rheumatology was consulted. You received steroids and IVIG with improvement in your strength. You had a muscle biopsy which you can follow up the results of outpatient. There was concern for a heart attack, so you had a left heart catheterization which showed no cardiac cause for your condition. You had a cardiac MRI which showed a normal ejection fraction and heart muscle. You will follow up with Rheumatology as an outpatient for continued management. You will continue working with physical therapy as an outpatient.      SECONDARY DISCHARGE DIAGNOSES  Diagnosis: Dysphagia  Assessment and Plan of Treatment: You were admitted with a 5 month history of muscle weakness, trouble swallowing, and 40lbs of weight loss. You were evaluated by the GI team and Speech & Swallow (S&S) team. You had an EGD at an outside hospital which showed no GI cause for your trouble swallowing. You failed a modified barium swallow with S&S while inpatient and were recommended to do alternative methods. You understood and accepted risk of aspiration with continuing to eat normal diet. Your swallowing strength improved with steroids while inpatient. You will follow up with Rheumatology as an outpatient. You will also follow up with GI outpatient for a colonoscopy.    Diagnosis: Diabetes  Assessment and Plan of Treatment: During your hospitalization, you were started on steroids to help with the myositis. Steroids increase the sugars in your body. The cardiologists would also prefer to discontinue your Janumet as they believe it may be contributing to your muscle pain and weakness. Your Janumet was discontinued but please continue titrating your insulin on a sliding scale to control your blood sugars.   Please follow-up closely with a primary care provider in 1 week from discharge for further monitoring and management of your diabetes.     PRINCIPAL DISCHARGE DIAGNOSIS  Diagnosis: Inflammatory myopathy  Assessment and Plan of Treatment: You were admitted with a 5 month history of muscle weakness, trouble swallowing, and 40lbs of weight loss. You were suspected to have an autoimmune condition where you have inflammation in your muscles. Rheumatology was consulted. You received steroids and IVIG with improvement in your strength. You had a muscle biopsy which you can follow up the results of outpatient. There was concern for a heart attack, so you had a left heart catheterization which showed no cardiac cause for your condition. You had a cardiac MRI which showed a normal ejection fraction and heart muscle. You will follow up with Rheumatology as an outpatient for continued management. You will continue working with physical therapy as an outpatient.  You can shower 24 hours after your discharge from the hospital and no dressing changes are needed.      SECONDARY DISCHARGE DIAGNOSES  Diagnosis: Dysphagia  Assessment and Plan of Treatment: You were admitted with a 5 month history of muscle weakness, trouble swallowing, and 40lbs of weight loss. You were evaluated by the GI team and Speech & Swallow (S&S) team. You had an EGD at an outside hospital which showed no GI cause for your trouble swallowing. You failed a modified barium swallow with S&S while inpatient and were recommended to do alternative methods. You understood and accepted risk of aspiration with continuing to eat normal diet. Your swallowing strength improved with steroids while inpatient. You will follow up with Rheumatology as an outpatient. You will also follow up with GI outpatient for a colonoscopy.    Diagnosis: Diabetes  Assessment and Plan of Treatment: During your hospitalization, you were started on steroids to help with the myositis. Steroids increase the sugars in your body. The cardiologists would also prefer to discontinue your Janumet as they believe it may be contributing to your muscle pain and weakness. Your Janumet was discontinued but please continue titrating your insulin on a sliding scale to control your blood sugars.   Please follow-up closely with a primary care provider in 1 week from discharge for further monitoring and management of your diabetes.

## 2021-09-06 NOTE — PROGRESS NOTE ADULT - SUBJECTIVE AND OBJECTIVE BOX
**************************  Michelle Meier  PGY-1 Internal Medicine  [phone]  **************************    Patient is a 61y old  Male who presents with a chief complaint of Myositis (05 Sep 2021 14:36)      SUBJECTIVE / OVERNIGHT EVENTS:  Patient sitting up in chair eating breakfast this AM. Denies muscular pain, weakness, fatigue this AM. Feeling better, no overnight events. Will go for muscle biopsy tomorrow.    MEDICATIONS  (STANDING):  aspirin enteric coated 81 milliGRAM(s) Oral daily  cholecalciferol 1000 Unit(s) Oral daily  dextrose 40% Gel 15 Gram(s) Oral once  dextrose 5%. 1000 milliLiter(s) (100 mL/Hr) IV Continuous <Continuous>  dextrose 5%. 1000 milliLiter(s) (50 mL/Hr) IV Continuous <Continuous>  dextrose 50% Injectable 25 Gram(s) IV Push once  dextrose 50% Injectable 12.5 Gram(s) IV Push once  dextrose 50% Injectable 25 Gram(s) IV Push once  enoxaparin Injectable 40 milliGRAM(s) SubCutaneous daily  glucagon  Injectable 1 milliGRAM(s) IntraMuscular once  immune   globulin 10% (GAMMAGARD) IVPB 25 Gram(s) IV Intermittent daily  insulin glargine Injectable (LANTUS) 6 Unit(s) SubCutaneous at bedtime  insulin lispro (ADMELOG) corrective regimen sliding scale   SubCutaneous three times a day before meals  insulin lispro (ADMELOG) corrective regimen sliding scale   SubCutaneous at bedtime  insulin lispro Injectable (ADMELOG) 4 Unit(s) SubCutaneous three times a day before meals  lactated ringers. 1000 milliLiter(s) (75 mL/Hr) IV Continuous <Continuous>  methylPREDNISolone sodium succinate Injectable 60 milliGRAM(s) IV Push daily  multivitamin 1 Tablet(s) Oral daily  pantoprazole    Tablet 40 milliGRAM(s) Oral before breakfast  thiamine 100 milliGRAM(s) Oral daily  trimethoprim  160 mG/sulfamethoxazole 800 mG 1 Tablet(s) Oral daily    MEDICATIONS  (PRN):  acetaminophen   Tablet .. 650 milliGRAM(s) Oral every 6 hours PRN Mild Pain (1 - 3), Moderate Pain (4 - 6)  diphenhydrAMINE   Injectable 50 milliGRAM(s) IV Push daily PRN pre IVIG administration  hydrocortisone sodium succinate Injectable 100 milliGRAM(s) IV Push daily PRN allergic reaction during IVIG      CAPILLARY BLOOD GLUCOSE    POCT Blood Glucose.: 115 mg/dL (06 Sep 2021 07:55)  POCT Blood Glucose.: 230 mg/dL (05 Sep 2021 21:28)  POCT Blood Glucose.: 114 mg/dL (05 Sep 2021 16:39)  POCT Blood Glucose.: 108 mg/dL (05 Sep 2021 11:40)    I&O's Summary    05 Sep 2021 07:01  -  06 Sep 2021 07:00  --------------------------------------------------------  IN: 690 mL / OUT: 510 mL / NET: 180 mL      PHYSICAL EXAM:  Vital Signs Last 24 Hrs  T(C): 36.5 (06 Sep 2021 04:11), Max: 36.6 (05 Sep 2021 20:26)  T(F): 97.7 (06 Sep 2021 04:11), Max: 97.9 (05 Sep 2021 20:26)  HR: 88 (06 Sep 2021 04:11) (76 - 97)  BP: 122/73 (06 Sep 2021 04:11) (98/59 - 133/74)  BP(mean): --  RR: 18 (06 Sep 2021 04:11) (18 - 18)  SpO2: 99% (06 Sep 2021 04:11) (96% - 99%)    CONSTITUTIONAL: NAD, well-developed  RESPIRATORY: Normal respiratory effort; lungs are clear to auscultation bilaterally  CARDIOVASCULAR: Regular rate and rhythm, normal S1 and S2, no murmur/rub/gallop; No lower extremity edema; Peripheral pulses are 2+ bilaterally  ABDOMEN: Nontender to palpation, normoactive bowel sounds, no rebound/guarding; No hepatosplenomegaly  MUSCLOSKELETAL: 3/5 strength in shoulder flexion, 5/5  strength, improved 4/5 strength hip flexion, 5/5 knee flexion/extension, 5/5 plantarflexion/dorsiflexion, sensation intact all throughout, 2+ patellar reflexes, 2+ biceps and 2+ brachioradialis reflex  PSYCH: A+O to person, place, and time; affect appropriate    LABS:                        10.8   10.68 )-----------( 335      ( 06 Sep 2021 07:14 )             34.2     09-06    133<L>  |  103  |  25<H>  ----------------------------<  69<L>  4.3   |  19<L>  |  0.61    Ca    9.3      06 Sep 2021 07:14  Phos  3.8     09-06  Mg     2.1     09-06    TPro  7.7  /  Alb  2.4<L>  /  TBili  0.3  /  DBili  x   /  AST  144<H>  /  ALT  189<H>  /  AlkPhos  65  09-06    PT/INR - ( 06 Sep 2021 07:29 )   PT: 11.7 sec;   INR: 0.97 ratio         PTT - ( 06 Sep 2021 07:29 )  PTT:28.4 sec     **************************  Michelle Meier  PGY-1 Internal Medicine  [phone]  **************************    Patient is a 61y old  Male who presents with a chief complaint of Myositis (05 Sep 2021 14:36)      SUBJECTIVE / OVERNIGHT EVENTS:  Patient sitting up in chair eating breakfast this AM, with no difficulty swallowing. Denies muscular pain, reports walking 10 laps around the unit. Endorses ongoing upper extremity weakness. Feeling better, no overnight events. Will go for muscle biopsy tomorrow.    MEDICATIONS  (STANDING):  aspirin enteric coated 81 milliGRAM(s) Oral daily  cholecalciferol 1000 Unit(s) Oral daily  dextrose 40% Gel 15 Gram(s) Oral once  dextrose 5%. 1000 milliLiter(s) (100 mL/Hr) IV Continuous <Continuous>  dextrose 5%. 1000 milliLiter(s) (50 mL/Hr) IV Continuous <Continuous>  dextrose 50% Injectable 25 Gram(s) IV Push once  dextrose 50% Injectable 12.5 Gram(s) IV Push once  dextrose 50% Injectable 25 Gram(s) IV Push once  enoxaparin Injectable 40 milliGRAM(s) SubCutaneous daily  glucagon  Injectable 1 milliGRAM(s) IntraMuscular once  immune   globulin 10% (GAMMAGARD) IVPB 25 Gram(s) IV Intermittent daily  insulin glargine Injectable (LANTUS) 6 Unit(s) SubCutaneous at bedtime  insulin lispro (ADMELOG) corrective regimen sliding scale   SubCutaneous three times a day before meals  insulin lispro (ADMELOG) corrective regimen sliding scale   SubCutaneous at bedtime  insulin lispro Injectable (ADMELOG) 4 Unit(s) SubCutaneous three times a day before meals  lactated ringers. 1000 milliLiter(s) (75 mL/Hr) IV Continuous <Continuous>  methylPREDNISolone sodium succinate Injectable 60 milliGRAM(s) IV Push daily  multivitamin 1 Tablet(s) Oral daily  pantoprazole    Tablet 40 milliGRAM(s) Oral before breakfast  thiamine 100 milliGRAM(s) Oral daily  trimethoprim  160 mG/sulfamethoxazole 800 mG 1 Tablet(s) Oral daily    MEDICATIONS  (PRN):  acetaminophen   Tablet .. 650 milliGRAM(s) Oral every 6 hours PRN Mild Pain (1 - 3), Moderate Pain (4 - 6)  diphenhydrAMINE   Injectable 50 milliGRAM(s) IV Push daily PRN pre IVIG administration  hydrocortisone sodium succinate Injectable 100 milliGRAM(s) IV Push daily PRN allergic reaction during IVIG      CAPILLARY BLOOD GLUCOSE    POCT Blood Glucose.: 115 mg/dL (06 Sep 2021 07:55)  POCT Blood Glucose.: 230 mg/dL (05 Sep 2021 21:28)  POCT Blood Glucose.: 114 mg/dL (05 Sep 2021 16:39)  POCT Blood Glucose.: 108 mg/dL (05 Sep 2021 11:40)    I&O's Summary    05 Sep 2021 07:01  -  06 Sep 2021 07:00  --------------------------------------------------------  IN: 690 mL / OUT: 510 mL / NET: 180 mL      PHYSICAL EXAM:  Vital Signs Last 24 Hrs  T(C): 36.5 (06 Sep 2021 04:11), Max: 36.6 (05 Sep 2021 20:26)  T(F): 97.7 (06 Sep 2021 04:11), Max: 97.9 (05 Sep 2021 20:26)  HR: 88 (06 Sep 2021 04:11) (76 - 97)  BP: 122/73 (06 Sep 2021 04:11) (98/59 - 133/74)  BP(mean): --  RR: 18 (06 Sep 2021 04:11) (18 - 18)  SpO2: 99% (06 Sep 2021 04:11) (96% - 99%)    CONSTITUTIONAL: NAD, well-developed  RESPIRATORY: Normal respiratory effort; lungs are clear to auscultation bilaterally  CARDIOVASCULAR: Regular rate and rhythm, normal S1 and S2, no murmur/rub/gallop; No lower extremity edema; Peripheral pulses are 2+ bilaterally  ABDOMEN: Nontender to palpation, normoactive bowel sounds, no rebound/guarding; No hepatosplenomegaly  MUSCLOSKELETAL: 3/5 strength in shoulder flexion, 5/5  strength, improved 4/5 strength hip flexion, 5/5 knee flexion/extension, 5/5 plantarflexion/dorsiflexion, sensation intact all throughout, 2+ patellar reflexes, 2+ biceps and 2+ brachioradialis reflex  PSYCH: A+O to person, place, and time; affect appropriate    LABS:                        10.8   10.68 )-----------( 335      ( 06 Sep 2021 07:14 )             34.2     09-06    133<L>  |  103  |  25<H>  ----------------------------<  69<L>  4.3   |  19<L>  |  0.61    Ca    9.3      06 Sep 2021 07:14  Phos  3.8     09-06  Mg     2.1     09-06    TPro  7.7  /  Alb  2.4<L>  /  TBili  0.3  /  DBili  x   /  AST  144<H>  /  ALT  189<H>  /  AlkPhos  65  09-06    PT/INR - ( 06 Sep 2021 07:29 )   PT: 11.7 sec;   INR: 0.97 ratio         PTT - ( 06 Sep 2021 07:29 )  PTT:28.4 sec

## 2021-09-06 NOTE — DISCHARGE NOTE PROVIDER - NSFOLLOWUPCLINICS_GEN_ALL_ED_FT
[facility] [place]  Internal Medicine  [address] [place] [address]  Phone: [phone]  Fax: [phone]  Follow Up Time: 1 week

## 2021-09-06 NOTE — PROGRESS NOTE ADULT - PROBLEM SELECTOR PLAN 1
Patients presentation of subacute-chronic weight loss, fatigue, proximal muscle weakness, (neuromuscular) dysphagia, and elevated troponins, CK, aldolase, CRP, and transaminases favor inflammatory myopathy. Without rash dermatomyositis is less likely. W/o any joint pain. Patient was prescribed statins upon discharge from [facility] but he did not report taking them and symptoms began prior to that point.   - CT c/a/p without masses or evidence of malignancy  - confirmed by MRI of left thigh, findings consistent with polymyositis  -rheumatology following    - s/p pulse dose solumedrol x3, now solumedrol IV 60 daily, c/w IVIG x5 days (finish 9/6)    - Bactrim started for PCP ppx, Protonix started for GI ppx    - f/u lab panels  - muscle biopsy with general surgery Tuesday 10:30am    - NPO Monday, hold DVT ppx, will need stat covid Patients presentation of subacute-chronic weight loss, fatigue, proximal muscle weakness, (neuromuscular) dysphagia, and elevated troponins, CK, aldolase, CRP, and transaminases favor inflammatory myopathy. Without rash dermatomyositis is less likely. W/o any joint pain. Patient was prescribed statins upon discharge from [facility] but he did not report taking them and symptoms began prior to that point.   - CT c/a/p without masses or evidence of malignancy  - confirmed by MRI of left thigh, findings consistent with polymyositis  -rheumatology following    - s/p pulse dose solumedrol x3, now solumedrol IV 60 daily, c/w IVIG x5 days (finish 9/6)    - Bactrim started for PCP ppx, Protonix started for GI ppx    - f/u lab panels  - muscle biopsy with general surgery Tuesday 9 AM  - NPO and hold DVT ppx after midnight

## 2021-09-06 NOTE — DISCHARGE NOTE PROVIDER - CARE PROVIDER_API CALL
Griselda Sousa)  Rheumatology  [address], [address]  [place] [address]  Phone: [phone]  Fax: [phone]  Scheduled Appointment: 09/13/2021 08:30 AM

## 2021-09-07 LAB
% ALBUMIN: 38.9 % — SIGNIFICANT CHANGE UP
% ALPHA 1: 8 % — SIGNIFICANT CHANGE UP
% ALPHA 2: 17.5 % — SIGNIFICANT CHANGE UP
% BETA: 14.8 % — SIGNIFICANT CHANGE UP
% GAMMA: 20.8 % — SIGNIFICANT CHANGE UP
ACRM BINDING ANTIBODY: 0.07 NMOL/L — SIGNIFICANT CHANGE UP (ref 0–0.24)
ALBUMIN SERPL ELPH-MCNC: 2.2 G/DL — LOW (ref 3.6–5.5)
ALBUMIN/GLOB SERPL ELPH: 0.6 RATIO — SIGNIFICANT CHANGE UP
ALPHA1 GLOB SERPL ELPH-MCNC: 0.5 G/DL — HIGH (ref 0.1–0.4)
ALPHA2 GLOB SERPL ELPH-MCNC: 1 G/DL — SIGNIFICANT CHANGE UP (ref 0.5–1)
ANION GAP SERPL CALC-SCNC: 10 MMOL/L — SIGNIFICANT CHANGE UP (ref 5–17)
B-GLOBULIN SERPL ELPH-MCNC: 0.8 G/DL — SIGNIFICANT CHANGE UP (ref 0.5–1)
BUN SERPL-MCNC: 21 MG/DL — SIGNIFICANT CHANGE UP (ref 7–23)
CALCIUM SERPL-MCNC: 9.1 MG/DL — SIGNIFICANT CHANGE UP (ref 8.4–10.5)
CHLORIDE SERPL-SCNC: 103 MMOL/L — SIGNIFICANT CHANGE UP (ref 96–108)
CK SERPL-CCNC: 1717 U/L — HIGH (ref 30–200)
CO2 SERPL-SCNC: 25 MMOL/L — SIGNIFICANT CHANGE UP (ref 22–31)
CREAT SERPL-MCNC: 0.61 MG/DL — SIGNIFICANT CHANGE UP (ref 0.5–1.3)
GAMMA GLOBULIN: 1.2 G/DL — SIGNIFICANT CHANGE UP (ref 0.6–1.6)
GLUCOSE BLDC GLUCOMTR-MCNC: 166 MG/DL — HIGH (ref 70–99)
GLUCOSE BLDC GLUCOMTR-MCNC: 202 MG/DL — HIGH (ref 70–99)
GLUCOSE BLDC GLUCOMTR-MCNC: 255 MG/DL — HIGH (ref 70–99)
GLUCOSE BLDC GLUCOMTR-MCNC: 89 MG/DL — SIGNIFICANT CHANGE UP (ref 70–99)
GLUCOSE BLDC GLUCOMTR-MCNC: 99 MG/DL — SIGNIFICANT CHANGE UP (ref 70–99)
GLUCOSE SERPL-MCNC: 79 MG/DL — SIGNIFICANT CHANGE UP (ref 70–99)
HCT VFR BLD CALC: 32.5 % — LOW (ref 39–50)
HGB BLD-MCNC: 10.2 G/DL — LOW (ref 13–17)
INTERPRETATION SERPL IFE-IMP: SIGNIFICANT CHANGE UP
MAGNESIUM SERPL-MCNC: 2 MG/DL — SIGNIFICANT CHANGE UP (ref 1.6–2.6)
MCHC RBC-ENTMCNC: 26.8 PG — LOW (ref 27–34)
MCHC RBC-ENTMCNC: 31.4 GM/DL — LOW (ref 32–36)
MCV RBC AUTO: 85.5 FL — SIGNIFICANT CHANGE UP (ref 80–100)
NRBC # BLD: 0 /100 WBCS — SIGNIFICANT CHANGE UP (ref 0–0)
PHOSPHATE SERPL-MCNC: 3.8 MG/DL — SIGNIFICANT CHANGE UP (ref 2.5–4.5)
PLATELET # BLD AUTO: 322 K/UL — SIGNIFICANT CHANGE UP (ref 150–400)
POTASSIUM SERPL-MCNC: 4.1 MMOL/L — SIGNIFICANT CHANGE UP (ref 3.5–5.3)
POTASSIUM SERPL-SCNC: 4.1 MMOL/L — SIGNIFICANT CHANGE UP (ref 3.5–5.3)
PROT PATTERN SERPL ELPH-IMP: SIGNIFICANT CHANGE UP
RBC # BLD: 3.8 M/UL — LOW (ref 4.2–5.8)
RBC # FLD: 13.6 % — SIGNIFICANT CHANGE UP (ref 10.3–14.5)
SODIUM SERPL-SCNC: 138 MMOL/L — SIGNIFICANT CHANGE UP (ref 135–145)
WBC # BLD: 9.62 K/UL — SIGNIFICANT CHANGE UP (ref 3.8–10.5)
WBC # FLD AUTO: 9.62 K/UL — SIGNIFICANT CHANGE UP (ref 3.8–10.5)

## 2021-09-07 PROCEDURE — 75561 CARDIAC MRI FOR MORPH W/DYE: CPT | Mod: 26

## 2021-09-07 PROCEDURE — 99233 SBSQ HOSP IP/OBS HIGH 50: CPT | Mod: GC

## 2021-09-07 PROCEDURE — 99233 SBSQ HOSP IP/OBS HIGH 50: CPT

## 2021-09-07 RX ORDER — SODIUM CHLORIDE 9 MG/ML
1000 INJECTION, SOLUTION INTRAVENOUS
Refills: 0 | Status: DISCONTINUED | OUTPATIENT
Start: 2021-09-07 | End: 2021-09-09

## 2021-09-07 RX ORDER — PANTOPRAZOLE SODIUM 20 MG/1
40 TABLET, DELAYED RELEASE ORAL ONCE
Refills: 0 | Status: COMPLETED | OUTPATIENT
Start: 2021-09-07 | End: 2021-09-07

## 2021-09-07 RX ADMIN — Medication 1 TABLET(S): at 12:25

## 2021-09-07 RX ADMIN — Medication 4 UNIT(S): at 16:31

## 2021-09-07 RX ADMIN — Medication 1: at 16:32

## 2021-09-07 RX ADMIN — INSULIN GLARGINE 6 UNIT(S): 100 INJECTION, SOLUTION SUBCUTANEOUS at 21:17

## 2021-09-07 RX ADMIN — SODIUM CHLORIDE 70 MILLILITER(S): 9 INJECTION, SOLUTION INTRAVENOUS at 16:28

## 2021-09-07 RX ADMIN — PANTOPRAZOLE SODIUM 40 MILLIGRAM(S): 20 TABLET, DELAYED RELEASE ORAL at 06:43

## 2021-09-07 RX ADMIN — Medication 60 MILLIGRAM(S): at 06:43

## 2021-09-07 RX ADMIN — Medication 81 MILLIGRAM(S): at 12:24

## 2021-09-07 RX ADMIN — Medication 2: at 12:25

## 2021-09-07 RX ADMIN — Medication 1: at 21:17

## 2021-09-07 RX ADMIN — SODIUM CHLORIDE 70 MILLILITER(S): 9 INJECTION, SOLUTION INTRAVENOUS at 20:47

## 2021-09-07 RX ADMIN — Medication 100 MILLIGRAM(S): at 12:24

## 2021-09-07 RX ADMIN — Medication 1000 UNIT(S): at 12:24

## 2021-09-07 NOTE — PROGRESS NOTE ADULT - ASSESSMENT
Assessment:  1. Troponin T Elevated      No active C/P or SOB      Cardiac Cath 9/1: non-obstructive CAD  2. Myositis / CPK Elevated / Muscular Atrophy and Weakness  3. Dysphagia  4. Weight Loss   5. DM  6. Colon Thickening on prior CT  7. Anemia / Iron Deficiency Anemia     Plan:  1. Cardiac MRI results reviewed, negative for myocarditis.     Troponin and CPK now down-trending after steroids started.   2. Continue ASA for medical management of non-obstructive CAD.   3. No further cardiac or vascular testing is needed prior to muscle biopsy or colonoscopy.  4. While NPO, he should have continuous IV fluids due to the mid cavitary gradients on Echo.      Oral hydration encouraged in general.   5. Anemia evaluation and Iron Deficiency repletion as per Primary Team and GI.  6. Statin on hold due to elevated CPK and concern for myositis.      Rheumatology consultation appreciated.  7. Dysphagia evaluation by GI / ENT / Speech and Swallow appreciated.  8. PSA negative. Age appropriate CA screening with a colonoscopy.  9. Consider Neuro evaluation if needed.      Rheumatology confident that is highly likely to be myositis   10. Discussed with Primary Team.    Thank you  JUAN Witt, MPAS  Vascular Cardiology Service    Please call with any questions:   Service Line: [phone]  Office [phone]

## 2021-09-07 NOTE — CHART NOTE - NSCHARTNOTEFT_GEN_A_CORE
Nutrition Follow Up Note  Patient seen for: malnutrition follow-up    Chart reviewed, events noted. Pending muscle bx tomorrow, originally scheduled for today    Source: [x] Patient       [x] EMR        [] RN        [] Family at bedside       [] Other:      Diet Order:   Diet, NPO after Midnight:      NPO Start Date: 07-Sep-2021,   NPO Start Time: 23:59 (09-07-21)  Diet, Soft:   Consistent Carbohydrate {No Snacks} (CSTCHO) (09-02-21)    - Is current order appropriate/adequate? [] Yes  [x]  No: Pt requires nutrition supplementation     - PO intake :   [] >75%  Adequate    [x] 50-75%  Fair       [] <50%  Poor    - Nutrition-related concerns: Reports appetite is increasing though pt was NPO all day for bx which is now rescheduled for tomorrow. Pt requires Kosher foods. Also would like Glucerna BID, encouraged pt to take supplement in between meals to encourage PO intake of meals. BG variable, explained Consistent Carbohydrate diet to patient. Lastly, pt would like the same meals daily- coordinated with dining services staff.    Pt was at risk for refeeding syndrome, Multivitamin and thiamine continue, Mg, P, K+ WNL.    Weights:   Daily     Nutritionally Pertinent MEDICATIONS  (STANDING):  cholecalciferol  dextrose 40% Gel  dextrose 5% + lactated ringers  dextrose 5%.  dextrose 5%.  dextrose 50% Injectable  dextrose 50% Injectable  dextrose 50% Injectable  glucagon  Injectable  insulin glargine Injectable (LANTUS)  insulin lispro (ADMELOG) corrective regimen sliding scale  insulin lispro (ADMELOG) corrective regimen sliding scale  insulin lispro Injectable (ADMELOG)  lactated ringers.  methylPREDNISolone sodium succinate Injectable  multivitamin  pantoprazole    Tablet  thiamine  trimethoprim  160 mG/sulfamethoxazole 800 mG    Pertinent Labs: 09-07 @ 06:37: Na 138, BUN 21, Cr 0.61, BG 79, K+ 4.1, Phos 3.8, Mg 2.0, Alk Phos --, ALT/SGPT --, AST/SGOT --, HbA1c --    A1C with Estimated Average Glucose Result: 7.2 % (08-31-21 @ 15:09)    Finger Sticks:  POCT Blood Glucose.: 202 mg/dL (09-07 @ 11:26)  POCT Blood Glucose.: 99 mg/dL (09-07 @ 07:17)  POCT Blood Glucose.: 89 mg/dL (09-07 @ 06:17)  POCT Blood Glucose.: 146 mg/dL (09-06 @ 20:56)  POCT Blood Glucose.: 250 mg/dL (09-06 @ 16:08)      Skin per nursing documentation: no pressure injuries   Edema: no edema     Estimated Needs:   [x] no change since previous assessment  [] recalculated:     Previous Nutrition Diagnosis: Severe, chronic malnutrition  Nutrition Diagnosis is: [x] ongoing  [] resolved [] not applicable     New Nutrition Diagnosis: [x] Not applicable    Nutrition Care Plan:  [x] In Progress  [] Achieved  [] Not applicable    Nutrition Interventions: RD to honor food preferences.      Education Provided:       [] Yes:  [x] No: inappropriate at this time, the pt was NPO at time of visit and continued to close eyes to sleep        Recommendations:         1) Change diet to: Consistent Carbohydrate + Soft + Kosher + Glucerna BID      2) Continue micronutrient supplementation      3) Continue monitoring blood glucose     Monitoring and Evaluation:   Continue to monitor nutritional intake, tolerance to diet prescription, weights, labs, skin integrity    Deanna Henderson RD, CDN. Pager: [phone]   JESSE remains available upon request and will follow up per protocol Nutrition Follow Up Note  Patient seen for: malnutrition follow-up    Chart reviewed, events noted. Pending muscle bx tomorrow, originally scheduled for today. S/p MBS, results were for NPO with non-oral means of nutrition, however pt preferred to continue diet despite aspiration risk.     Source: [x] Patient       [x] EMR        [] RN        [] Family at bedside       [] Other:      Diet Order:   Diet, NPO after Midnight:      NPO Start Date: 07-Sep-2021,   NPO Start Time: 23:59 (09-07-21)  Diet, Soft:   Consistent Carbohydrate {No Snacks} (CSTCHO) (09-02-21)    - Is current order appropriate/adequate? [] Yes  [x]  No: Pt requires nutrition supplementation     - PO intake :   [] >75%  Adequate    [x] 50-75%  Fair       [] <50%  Poor    - Nutrition-related concerns: Reports appetite is increasing though pt was NPO all day for bx which is now rescheduled for tomorrow. Pt requires Kosher foods. Also would like Glucerna BID, encouraged pt to take supplement in between meals to encourage PO intake of meals. BG variable, explained Consistent Carbohydrate diet to patient. Lastly, pt would like the same meals daily- coordinated with dining services staff.    Pt was at risk for refeeding syndrome, Multivitamin and thiamine continue, Mg, P, K+ WNL.    Weights:   Daily     Nutritionally Pertinent MEDICATIONS  (STANDING):  cholecalciferol  dextrose 40% Gel  dextrose 5% + lactated ringers  dextrose 5%.  dextrose 5%.  dextrose 50% Injectable  dextrose 50% Injectable  dextrose 50% Injectable  glucagon  Injectable  insulin glargine Injectable (LANTUS)  insulin lispro (ADMELOG) corrective regimen sliding scale  insulin lispro (ADMELOG) corrective regimen sliding scale  insulin lispro Injectable (ADMELOG)  lactated ringers.  methylPREDNISolone sodium succinate Injectable  multivitamin  pantoprazole    Tablet  thiamine  trimethoprim  160 mG/sulfamethoxazole 800 mG    Pertinent Labs: 09-07 @ 06:37: Na 138, BUN 21, Cr 0.61, BG 79, K+ 4.1, Phos 3.8, Mg 2.0, Alk Phos --, ALT/SGPT --, AST/SGOT --, HbA1c --    A1C with Estimated Average Glucose Result: 7.2 % (08-31-21 @ 15:09)    Finger Sticks:  POCT Blood Glucose.: 202 mg/dL (09-07 @ 11:26)  POCT Blood Glucose.: 99 mg/dL (09-07 @ 07:17)  POCT Blood Glucose.: 89 mg/dL (09-07 @ 06:17)  POCT Blood Glucose.: 146 mg/dL (09-06 @ 20:56)  POCT Blood Glucose.: 250 mg/dL (09-06 @ 16:08)      Skin per nursing documentation: no pressure injuries   Edema: no edema     Estimated Needs:   [x] no change since previous assessment  [] recalculated:     Previous Nutrition Diagnosis: Severe, chronic malnutrition  Nutrition Diagnosis is: [x] ongoing  [] resolved [] not applicable     New Nutrition Diagnosis: [x] Not applicable    Nutrition Care Plan:  [x] In Progress  [] Achieved  [] Not applicable    Nutrition Interventions: RD to honor food preferences.      Education Provided:       [] Yes:  [x] No: inappropriate at this time, the pt was NPO at time of visit and continued to close eyes to sleep        Recommendations:         1) Change diet to: Consistent Carbohydrate + Soft + Kosher + Glucerna BID      2) Continue micronutrient supplementation      3) Continue monitoring blood glucose     Monitoring and Evaluation:   Continue to monitor nutritional intake, tolerance to diet prescription, weights, labs, skin integrity    Deanna Henderson RD, CDN. Pager: [phone]   JESSE remains available upon request and will follow up per protocol

## 2021-09-07 NOTE — PROGRESS NOTE ADULT - ASSESSMENT
62 yo M with PMHx of Diabetes and short term statin use presents with subacute progressively worsening proximal muscle weakness, dysphagia and 35 lb weight loss x 5 month. Physical exam significant for proximal weakness, no skin rash. CK in 3000s range.       # inflammatory myopathy  - differential polymyositis, dermatomyositis (lack of skin findings does not support this), inclusion body myositis (usually a distal myositis), statin induced myopathy (unlikely as he hasn't been on a statin in a few years) vs. necrotizing autoimmune myopathy  - Myomarker panel and HMG-CoA Reductase antibody pending    - anti-Natasha-1 negative   - hep panel negative, Quanti-gold negative   - MRI completed on 9/1- showed b/l diffuse muscle edema and enhancement, pending muscle bx next week    - s/p Solumedrol 500mg IV x 3 days (9/1- 9/3), started Solumedrol 60mg (1mg/kg) on 9/4    - s/p IVIG x 5 days (9/2-9/6)  - CT c/a/p: negative for intrathoracic and intra-abd malignancy    - iron deficiency anemia, please replete iron, never had colonoscopy before     - age appropriate malignancy screen in outpt setting    - will also need DEXA scan outpt.     pt can follow up with Dr. Griselda Sousa at [facility] at [address] [place] [address], [address], [place],  [phone]   in 2- 3 week    discharge pt on Medrol 60mg PO daily, please continue PCP and GI prophylaxis     Varinder Knapp, PGY-4  Rheumatology Fellow   Pager: [phone]      *** INCOMPLETE NOTE  62 yo M with PMHx of Diabetes and short term statin use presents with subacute progressively worsening proximal muscle weakness, dysphagia and 35 lb weight loss x 5 month. Physical exam significant for proximal weakness, no skin rash. CK in 3000s range.       # inflammatory myopathy  - differential polymyositis, dermatomyositis (lack of skin findings does not support this), inclusion body myositis (usually a distal myositis), statin induced myopathy (unlikely as he hasn't been on a statin in a few years) vs. necrotizing autoimmune myopathy  - Myomarker panel and HMG-CoA Reductase antibody pending    - anti-Natasha-1 negative   - hep panel negative, Quanti-gold negative   - MRI completed on 9/1- showed b/l diffuse muscle edema and enhancement, pending muscle bx next week    - s/p Solumedrol 500mg IV x 3 days (9/1- 9/3), started Solumedrol 60mg (1mg/kg) on 9/4    - s/p IVIG x 5 days (9/2-9/6)  - CT c/a/p: negative for intrathoracic and intra-abd malignancy    - iron deficiency anemia, please replete iron, never had colonoscopy before     - age appropriate malignancy screen in outpt setting    - will also need DEXA scan outpt.   - discharge pt on Medrol PO 24mg BID    please continue PCP and GI prophylaxis     pt can follow up with Dr. Griselda Sousa at [facility] at [address] [place] [address], [address], [place],  [phone]   in 2- 3 week      aVrinder Knapp, PGY-4  Rheumatology Fellow   Pager: [phone]      *** INCOMPLETE NOTE  60 yo M with PMHx of Diabetes and short term statin use presents with subacute progressively worsening proximal muscle weakness, dysphagia and 35 lb weight loss x 5 month. Physical exam significant for proximal weakness, no skin rash. CK in 3000s range        # inflammatory myopathy  - differential polymyositis, dermatomyositis (lack of skin findings does not support this), inclusion body myositis (usually a distal myositis), statin induced myopathy (unlikely as he hasn't been on a statin in a few years) vs. necrotizing autoimmune myopathy  - Myomarker panel and HMG-CoA Reductase antibody pending    - anti-Natasha-1 negative   - hep panel negative, Quanti-gold negative   - MRI thigh completed on 9/1- showed b/l diffuse muscle edema and enhancement, pending muscle bx   - s/p Solumedrol 500mg IV x 3 days (9/1- 9/3), s/p Solumedrol 60mg (1mg/kg) 9/4-9/7, s/p IVIG x 5 days (9/2-9/6)  - CT c/a/p: negative for intrathoracic and intra-abd malignancy    - iron deficiency anemia, please replete iron, never had colonoscopy before     - age appropriate malignancy screen in outpt setting    - will also need DEXA scan outpt.   - will switch pt to Medrol PO 24mg BID (9/8-), please discharge pt on this regime, continue PCP and GI prophylaxis, vitamin D supplement     pt can follow up with Dr. Griselda Sousa at [facility] at [address] [place] [address], [address], [place],  [phone]   in 2- 3 week    Case discussed with attending     Varinder Knapp, PGY-4  Rheumatology Fellow   Pager: [phone]      *** INCOMPLETE NOTE

## 2021-09-07 NOTE — PROGRESS NOTE ADULT - PROBLEM SELECTOR PLAN 6
Had colonic thickening on CT at OSH. planned for colonoscopy but held off due to elevated troponins and request for LHC prior to C-scope. per report, the thickening could have been due to underdistension.  -given current differential patient would likely benefit from malignancy workup anyway  - No evidence of malignancy on CT chest/abdo/pelvis   - GI following for dysphagia and colonic thickening, signed off at this time. Outpatient colonoscopy.

## 2021-09-07 NOTE — PROGRESS NOTE ADULT - SUBJECTIVE AND OBJECTIVE BOX
Vascular Cardiology Consult Note    SERVICE CONSULT: [phone]        OFFICE [phone]    CC: TIJERINA / Muscle Weakness / Dysphagia    Interval Events:  Cardiac MRI this AM negative for myocarditis.  Reports feeling weaker today due to NPO status.  Muscle biopsy rescheduled to tomorrow.   No C/P or SOB.  No LE pain or swelling.    Allergies  No Known Allergies    MEDICATIONS  (STANDING):  aspirin enteric coated 81 milliGRAM(s) Oral daily  cholecalciferol 1000 Unit(s) Oral daily  glucagon  Injectable 1 milliGRAM(s) IntraMuscular once  insulin glargine Injectable (LANTUS) 6 Unit(s) SubCutaneous at bedtime  insulin lispro (ADMELOG) corrective regimen sliding scale   SubCutaneous three times a day before meals  insulin lispro (ADMELOG) corrective regimen sliding scale   SubCutaneous at bedtime  insulin lispro Injectable (ADMELOG) 4 Unit(s) SubCutaneous three times a day before meals  lactated ringers. 1000 milliLiter(s) (75 mL/Hr) IV Continuous <Continuous>  methylPREDNISolone sodium succinate Injectable 60 milliGRAM(s) IV Push daily  multivitamin 1 Tablet(s) Oral daily  pantoprazole    Tablet 40 milliGRAM(s) Oral before breakfast  thiamine 100 milliGRAM(s) Oral daily  trimethoprim  160 mG/sulfamethoxazole 800 mG 1 Tablet(s) Oral daily    PAST MEDICAL & SURGICAL HISTORY:  Type 2 diabetes mellitus  History of esophagogastroduodenoscopy (EGD)    FAMILY HISTORY:  FH: leukemia (Father)    SOCIAL HISTORY:  unchanged    REVIEW OF SYSTEMS:  CONSTITUTIONAL: No fever  EYES: No eye pain  ENT:  No throat pain  NECK: No pain  RESPIRATORY: TIJERINA  CARDIOVASCULAR: No C/P   GASTROINTESTINAL: No abdominal pain  GENITOURINARY: No hematuria  NEUROLOGICAL: loss of strength  SKIN: rash to L UE  LYMPH Nodes: No enlarged glands noted  ENDOCRINE: No heat or cold intolerance noted  MUSCULOSKELETAL: No swelling or extremity pain  PSYCHIATRIC: No depression, anxiety  HEME/LYMPH: No  bleeding gums  ALLERGY AND IMMUNOLOGIC: No hives    [ x] All others negative	    PHYSICAL EXAM:  Vital Signs Last 24 Hrs  T(C): 36.6 (07 Sep 2021 11:14), Max: 37.1 (06 Sep 2021 23:30)  T(F): 97.8 (07 Sep 2021 11:14), Max: 98.7 (06 Sep 2021 23:30)  HR: 88 (07 Sep 2021 11:14) (75 - 112)  BP: 106/65 (07 Sep 2021 11:14) (106/65 - 123/71)  RR: 18 (07 Sep 2021 11:14) (18 - 18)  SpO2: 97% (07 Sep 2021 11:14) (97% - 99%)    Appearance:  	  HEENT:  NC/AT  Cardiovascular: RRR, S1 and S2   Respiratory: CTA B/L  Psychiatry:  AAO x 3  Gastrointestinal:  Soft, Non-tender, + BS	  Skin: No ecchymoses, No cyanosis	  Neurologic: No focal deficits noted  Extremities: No LE edema, B/L calves soft  Foot Exam: No tissue loss or ulceration  R Radial Site: C/D/I, Radial Pulse 2+    LABS:	 	                        10.2   9.62  )-----------( 322      ( 07 Sep 2021 06:37 )             32.5     09-07    138  |  103  |  21  ----------------------------<  79  4.1   |  25  |  0.61    Ca    9.1      07 Sep 2021 06:37  Phos  3.8     09-07  Mg     2.0     09-07    TPro  7.7  /  Alb  2.4<L>  /  TBili  0.3  /  DBili  x   /  AST  144<H>  /  ALT  189<H>  /  AlkPhos  65  09-06    PT/INR - ( 06 Sep 2021 07:29 )   PT: 11.7 sec;   INR: 0.97 ratio    PTT - ( 06 Sep 2021 07:29 )  PTT:28.4 sec    CARDIAC MARKERS ( 07 Sep 2021 06:37 )  x     / x     / 1717 U/L / x     / x

## 2021-09-07 NOTE — PROGRESS NOTE ADULT - PROBLEM SELECTOR PLAN 5
Reports taking 10-12 U of daily insulin as well as janumet. reports that A1c had been poorly controlled in the past. Most recent on outpatient paperwork was 8.1. it is possible that patient's dysphagia is related to gastroparesis although less likely.  - A1C 7.2  - s/p pulse dose steroids, now on IV solumedrol 60 daily, titrate insulin  - lantus 6 qhs, ademlog 4 mealtime  - low dose ISS

## 2021-09-07 NOTE — PROGRESS NOTE ADULT - SUBJECTIVE AND OBJECTIVE BOX
MRI Contrast Discharge Instructions    The IV contrast you received today will pass out of your body in your  urine. This will happen in the next 24 hours. You will not feel this process.  Your urine will not change color.    Drink at least 4 extra glasses of water or juice today (unless your doctor  has restricted your fluids). This reduces the stress on your kidneys.  You may take your regular medicines.    If you are on dialysis: It is best to have dialysis today.    If you have a reaction: Most reactions happen right away. If you have  any new symptoms after leaving the hospital (such as hives or swelling),  call your hospital at the correct number below. Or call your family doctor.  If you have breathing distress or wheezing, call 911.    Special instructions: ***    I have read and understand the above information.    Signature:______________________________________ Date:___________    Staff:__________________________________________ Date:___________     Time:__________    [facility] Departments:    ___[place]: [phone]  ___[facility]: [phone]  ___[place]: [phone] ___[facility]: [phone]  ___[facility]: [phone]  ___[facility]: [phone]  ___[phone]  ___[facility]: [phone]  ___[phone]   **************************  Michelle Meier  PGY-1 Internal Medicine  [phone]  **************************    Patient is a 61y old  Male who presents with a chief complaint of Myositis (07 Sep 2021 09:01)      SUBJECTIVE / OVERNIGHT EVENTS:  Patient sitting up in chair this AM. Reports yesterday he was too tired to walk around the unit as per usual. No overnight events. This morning denies fatigue and lower extremity weakness. Endorses upper extremity weakness. Will go for muscle biopsy and cMRI today.    MEDICATIONS  (STANDING):  aspirin enteric coated 81 milliGRAM(s) Oral daily  cholecalciferol 1000 Unit(s) Oral daily  dextrose 40% Gel 15 Gram(s) Oral once  dextrose 5%. 1000 milliLiter(s) (100 mL/Hr) IV Continuous <Continuous>  dextrose 5%. 1000 milliLiter(s) (50 mL/Hr) IV Continuous <Continuous>  dextrose 50% Injectable 25 Gram(s) IV Push once  dextrose 50% Injectable 12.5 Gram(s) IV Push once  dextrose 50% Injectable 25 Gram(s) IV Push once  glucagon  Injectable 1 milliGRAM(s) IntraMuscular once  insulin glargine Injectable (LANTUS) 6 Unit(s) SubCutaneous at bedtime  insulin lispro (ADMELOG) corrective regimen sliding scale   SubCutaneous three times a day before meals  insulin lispro (ADMELOG) corrective regimen sliding scale   SubCutaneous at bedtime  insulin lispro Injectable (ADMELOG) 4 Unit(s) SubCutaneous three times a day before meals  lactated ringers. 1000 milliLiter(s) (75 mL/Hr) IV Continuous <Continuous>  methylPREDNISolone sodium succinate Injectable 60 milliGRAM(s) IV Push daily  multivitamin 1 Tablet(s) Oral daily  pantoprazole    Tablet 40 milliGRAM(s) Oral before breakfast  thiamine 100 milliGRAM(s) Oral daily  trimethoprim  160 mG/sulfamethoxazole 800 mG 1 Tablet(s) Oral daily    MEDICATIONS  (PRN):  acetaminophen   Tablet .. 650 milliGRAM(s) Oral every 6 hours PRN Mild Pain (1 - 3), Moderate Pain (4 - 6)  hydrocortisone sodium succinate Injectable 100 milliGRAM(s) IV Push daily PRN allergic reaction during IVIG      CAPILLARY BLOOD GLUCOSE    POCT Blood Glucose.: 99 mg/dL (07 Sep 2021 07:17)  POCT Blood Glucose.: 89 mg/dL (07 Sep 2021 06:17)  POCT Blood Glucose.: 146 mg/dL (06 Sep 2021 20:56)  POCT Blood Glucose.: 250 mg/dL (06 Sep 2021 16:08)  POCT Blood Glucose.: 203 mg/dL (06 Sep 2021 11:17)    I&O's Summary    06 Sep 2021 07:01  -  07 Sep 2021 07:00  --------------------------------------------------------  IN: 740 mL / OUT: 200 mL / NET: 540 mL      PHYSICAL EXAM:  Vital Signs Last 24 Hrs  T(C): 37 (07 Sep 2021 08:18), Max: 37.1 (06 Sep 2021 23:30)  T(F): 98.6 (07 Sep 2021 08:18), Max: 98.7 (06 Sep 2021 23:30)  HR: 112 (07 Sep 2021 08:18) (75 - 112)  BP: 123/71 (07 Sep 2021 08:18) (110/70 - 123/71)  BP(mean): --  RR: 18 (07 Sep 2021 08:18) (18 - 18)  SpO2: 97% (07 Sep 2021 08:18) (97% - 99%)    CONSTITUTIONAL: NAD, well-developed  RESPIRATORY: Normal respiratory effort; lungs are clear to auscultation bilaterally  CARDIOVASCULAR: Regular rate and rhythm, normal S1 and S2, no murmur/rub/gallop; No lower extremity edema; Peripheral pulses are 2+ bilaterally  ABDOMEN: Nontender to palpation, normoactive bowel sounds, no rebound/guarding; No hepatosplenomegaly  MUSCLOSKELETAL: 3-4/5 strength in proximal UE and LE, 2/5 strength in distal UE and LE  PSYCH: A+O to person, place, and time; affect appropriate    LABS:                        10.2   9.62  )-----------( 322      ( 07 Sep 2021 06:37 )             32.5     09-07    138  |  103  |  21  ----------------------------<  79  4.1   |  25  |  0.61    Ca    9.1      07 Sep 2021 06:37  Phos  3.8     09-07  Mg     2.0     09-07    TPro  7.7  /  Alb  2.4<L>  /  TBili  0.3  /  DBili  x   /  AST  144<H>  /  ALT  189<H>  /  AlkPhos  65  09-06    PT/INR - ( 06 Sep 2021 07:29 )   PT: 11.7 sec;   INR: 0.97 ratio         PTT - ( 06 Sep 2021 07:29 )  PTT:28.4 sec  CARDIAC MARKERS ( 07 Sep 2021 06:37 )  x     / x     / 1717 U/L / x     / x

## 2021-09-07 NOTE — CHART NOTE - NSCHARTNOTEFT_GEN_A_CORE
OR case cancelled for today. Rescheduled on Thursday 9/9 at 9:30am.  Surgery will follow. Patient can have a diet. Please make him NPO on Wednesday night.     ATP  [id number]

## 2021-09-07 NOTE — PROGRESS NOTE ADULT - PROBLEM SELECTOR PLAN 1
Patients presentation of subacute-chronic weight loss, fatigue, proximal muscle weakness, (neuromuscular) dysphagia, and elevated troponins, CK, aldolase, CRP, and transaminases favor inflammatory myopathy. Without rash dermatomyositis is less likely. W/o any joint pain. Patient was prescribed statins upon discharge from [facility] but he did not report taking them and symptoms began prior to that point.   - CT c/a/p without masses or evidence of malignancy  - confirmed by MRI of left thigh, findings consistent with polymyositis  -rheumatology following    - s/p pulse dose solumedrol x3, now solumedrol IV 60 daily, c/w IVIG x5 days (finish 9/6)    - Bactrim started for PCP ppx, Protonix started for GI ppx    - f/u lab panels  - muscle biopsy with general surgery today 9/7

## 2021-09-07 NOTE — PROGRESS NOTE ADULT - ASSESSMENT
ASSESSMENT: Patient is a 61y old m with DM2, p/w subacute proximal muscle weakness, weight loss, dysphagia and troponemia. Concern for polymyositis and cardiomyositis. Surgery consulted for muscle biopsy.    PLAN:   - Surgery team to perform muscle biopsy in OR, left thigh per MRI read  - Booked for OR Tuesday 9/7 @ 9:00am  - Cardiology and medicine clearance noted  - NPO at midnight 9/7 (late Monday into early Tuesday)  - Care per primary team   ASSESSMENT: Patient is a 61y old m with DM2, p/w subacute proximal muscle weakness, weight loss, dysphagia and troponemia. Concern for polymyositis and cardiomyositis. Surgery consulted for muscle biopsy.    PLAN:   - Surgery team to perform muscle biopsy in OR, left thigh per MRI read  - Booked for OR today 9/7 @ 9:00am  - Cardiology and medicine clearance noted  - NPO at midnight 9/7 (late Monday into early Tuesday)  - Care per primary team   ASSESSMENT: Patient is a 61y old m with DM2, p/w subacute proximal muscle weakness, weight loss, dysphagia and troponemia. Concern for polymyositis and cardiomyositis. Surgery consulted for muscle biopsy.    PLAN:   - Surgery team to perform muscle biopsy in OR, left thigh per MRI read  - Booked for OR today 9/7 @ 9:00am  - Cardiology and medicine clearance noted  - NPO at midnight 9/7 (late Monday into early Tuesday)  - Care per primary team    Vinod Dickson  PGY-2  ATP  [id number]

## 2021-09-07 NOTE — PROGRESS NOTE ADULT - PROBLEM SELECTOR PLAN 2
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 Troponins began downtrending significantly with steroids.   -suspect elevation in the setting of myositis vs myocarditis  - ECHO with hyperdynamic EF=75-80%, mild diastolic dysfunction grade I  - LHC w/ nonobstructive CAD  - c/w low dose aspirin  - continue to hold statins in the setting of myopathy and elevated AST/ALT  - c/w telemetry monitoring, NSR  - cMRI today 9/7 to investigate myocarditis (COVID vaccine one month ago)

## 2021-09-07 NOTE — PROGRESS NOTE ADULT - SUBJECTIVE AND OBJECTIVE BOX
KARLA LORENZ  [id number]    INTERVAL HPI/OVERNIGHT EVENTS:        PMHx/PSHx/FamHx/SocHx reviewed and no significant changes    REVIEW OF SYSTEMS   - reviewed with patient and  negative other than as above or previously documented.     MEDICATIONS  (STANDING):  aspirin enteric coated 81 milliGRAM(s) Oral daily  cholecalciferol 1000 Unit(s) Oral daily  dextrose 40% Gel 15 Gram(s) Oral once  dextrose 5%. 1000 milliLiter(s) (100 mL/Hr) IV Continuous <Continuous>  dextrose 5%. 1000 milliLiter(s) (50 mL/Hr) IV Continuous <Continuous>  dextrose 50% Injectable 25 Gram(s) IV Push once  dextrose 50% Injectable 12.5 Gram(s) IV Push once  dextrose 50% Injectable 25 Gram(s) IV Push once  enoxaparin Injectable 40 milliGRAM(s) SubCutaneous daily  glucagon  Injectable 1 milliGRAM(s) IntraMuscular once  insulin glargine Injectable (LANTUS) 6 Unit(s) SubCutaneous at bedtime  insulin lispro (ADMELOG) corrective regimen sliding scale   SubCutaneous three times a day before meals  insulin lispro (ADMELOG) corrective regimen sliding scale   SubCutaneous at bedtime  insulin lispro Injectable (ADMELOG) 4 Unit(s) SubCutaneous three times a day before meals  lactated ringers. 1000 milliLiter(s) (75 mL/Hr) IV Continuous <Continuous>  methylPREDNISolone sodium succinate Injectable 60 milliGRAM(s) IV Push daily  multivitamin 1 Tablet(s) Oral daily  pantoprazole    Tablet 40 milliGRAM(s) Oral before breakfast  thiamine 100 milliGRAM(s) Oral daily  trimethoprim  160 mG/sulfamethoxazole 800 mG 1 Tablet(s) Oral daily    MEDICATIONS  (PRN):  acetaminophen   Tablet .. 650 milliGRAM(s) Oral every 6 hours PRN Mild Pain (1 - 3), Moderate Pain (4 - 6)  hydrocortisone sodium succinate Injectable 100 milliGRAM(s) IV Push daily PRN allergic reaction during IVIG      Allergies    No Known Allergies    Intolerances          Vital Signs Last 24 Hrs  T(C): 37 (07 Sep 2021 08:18), Max: 37.1 (06 Sep 2021 23:30)  T(F): 98.6 (07 Sep 2021 08:18), Max: 98.7 (06 Sep 2021 23:30)  HR: 112 (07 Sep 2021 08:18) (75 - 112)  BP: 123/71 (07 Sep 2021 08:18) (110/70 - 123/71)  BP(mean): --  RR: 18 (07 Sep 2021 08:18) (18 - 18)  SpO2: 97% (07 Sep 2021 08:18) (97% - 99%)    Physical Exam:  General: NAD  HEENT: EOMI, MMM  Cardio: +S1/S2, RRR  Resp: CTA b/l  GI: +BS, soft, NT/ND  MSK:  Neuro: AAOx3  Psych: wnl    LABS:                        10.2   9.62  )-----------( 322      ( 07 Sep 2021 06:37 )             32.5     09-07    138  |  103  |  21  ----------------------------<  79  4.1   |  25  |  0.61    Ca    9.1      07 Sep 2021 06:37  Phos  3.8     09-07  Mg     2.0     09-07    TPro  7.7  /  Alb  2.4<L>  /  TBili  0.3  /  DBili  x   /  AST  144<H>  /  ALT  189<H>  /  AlkPhos  65  09-06    PT/INR - ( 06 Sep 2021 07:29 )   PT: 11.7 sec;   INR: 0.97 ratio         PTT - ( 06 Sep 2021 07:29 )  PTT:28.4 sec        RADIOLOGY & ADDITIONAL TESTS:       KARLA LORENZ  [id number]    INTERVAL HPI/ OVERNIGHT EVENTS: continue to endorse b/l UE and LE weakness, able to walk, UE still very weak, dysphagia improved, able to eat 50% to 75% of meals, pending muscle bx.     PMHx/PSHx/FamHx/SocHx reviewed and no significant changes    REVIEW OF SYSTEMS   - reviewed with patient and  negative other than as above or previously documented.     MEDICATIONS  (STANDING):  aspirin enteric coated 81 milliGRAM(s) Oral daily  cholecalciferol 1000 Unit(s) Oral daily  dextrose 40% Gel 15 Gram(s) Oral once  dextrose 5%. 1000 milliLiter(s) (100 mL/Hr) IV Continuous <Continuous>  dextrose 5%. 1000 milliLiter(s) (50 mL/Hr) IV Continuous <Continuous>  dextrose 50% Injectable 25 Gram(s) IV Push once  dextrose 50% Injectable 12.5 Gram(s) IV Push once  dextrose 50% Injectable 25 Gram(s) IV Push once  enoxaparin Injectable 40 milliGRAM(s) SubCutaneous daily  glucagon  Injectable 1 milliGRAM(s) IntraMuscular once  insulin glargine Injectable (LANTUS) 6 Unit(s) SubCutaneous at bedtime  insulin lispro (ADMELOG) corrective regimen sliding scale   SubCutaneous three times a day before meals  insulin lispro (ADMELOG) corrective regimen sliding scale   SubCutaneous at bedtime  insulin lispro Injectable (ADMELOG) 4 Unit(s) SubCutaneous three times a day before meals  lactated ringers. 1000 milliLiter(s) (75 mL/Hr) IV Continuous <Continuous>  methylPREDNISolone sodium succinate Injectable 60 milliGRAM(s) IV Push daily  multivitamin 1 Tablet(s) Oral daily  pantoprazole    Tablet 40 milliGRAM(s) Oral before breakfast  thiamine 100 milliGRAM(s) Oral daily  trimethoprim  160 mG/sulfamethoxazole 800 mG 1 Tablet(s) Oral daily    MEDICATIONS  (PRN):  acetaminophen   Tablet .. 650 milliGRAM(s) Oral every 6 hours PRN Mild Pain (1 - 3), Moderate Pain (4 - 6)  hydrocortisone sodium succinate Injectable 100 milliGRAM(s) IV Push daily PRN allergic reaction during IVIG    Allergies  No Known Allergies  Intolerances    Vital Signs Last 24 Hrs  T(C): 37 (07 Sep 2021 08:18), Max: 37.1 (06 Sep 2021 23:30)  T(F): 98.6 (07 Sep 2021 08:18), Max: 98.7 (06 Sep 2021 23:30)  HR: 112 (07 Sep 2021 08:18) (75 - 112)  BP: 123/71 (07 Sep 2021 08:18) (110/70 - 123/71)  BP(mean): --  RR: 18 (07 Sep 2021 08:18) (18 - 18)  SpO2: 97% (07 Sep 2021 08:18) (97% - 99%)    Physical Exam:  General: NAD  HEENT: EOMI, MMM  Cardio: +S1/S2, RRR  Resp: CTA b/l  GI: +BS, soft, NT/ND  MSK: no synovitis, joints NTP    tongue: midline, no deviation   neck flexion/extension:  4/5   deltoid: 5/5 b/l   biceps/triceps: 3+ b/l   Hand/: 5/5 b/l   Hip flexion/extension: 3+/5 b/l   knee flexion/extension:  4/5 b/l    dorsiflex/plantar flex: 5/5 b/l   Neuro: AAOx3  Psych: wnl    LABS:                        10.2   9.62  )-----------( 322      ( 07 Sep 2021 06:37 )             32.5     09-07    138  |  103  |  21  ----------------------------<  79  4.1   |  25  |  0.61    Ca    9.1      07 Sep 2021 06:37  Phos  3.8     09-07  Mg     2.0     09-07    TPro  7.7  /  Alb  2.4<L>  /  TBili  0.3  /  DBili  x   /  AST  144<H>  /  ALT  189<H>  /  AlkPhos  65  09-06    PT/INR - ( 06 Sep 2021 07:29 )   PT: 11.7 sec;   INR: 0.97 ratio         PTT - ( 06 Sep 2021 07:29 )  PTT:28.4 sec        RADIOLOGY & ADDITIONAL TESTS:

## 2021-09-07 NOTE — PROGRESS NOTE ADULT - SUBJECTIVE AND OBJECTIVE BOX
SUBJECTIVE:  Pain controlled. No new complaints.    OBJECTIVE:  Vital Signs Last 24 Hrs  T(C): 37 (07 Sep 2021 00:30), Max: 37.1 (06 Sep 2021 23:30)  T(F): 98.6 (07 Sep 2021 00:30), Max: 98.7 (06 Sep 2021 23:30)  HR: 75 (07 Sep 2021 00:30) (75 - 100)  BP: 116/72 (07 Sep 2021 00:30) (110/70 - 122/73)  RR: 18 (07 Sep 2021 00:30) (18 - 18)  SpO2: 98% (07 Sep 2021 00:30) (97% - 99%)    Physical Examination:  GEN: NAD, resting quietly  NEURO: AAOx3, CN II-XII grossly intact, no focal deficits  PULM: symmetric chest rise bilaterally, no increased WOB  EXTR: no LE erythema, moving all extremities  VASC: LE warm and well-perfused      LABS:                        10.8   10.68 )-----------( 335      ( 06 Sep 2021 07:14 )             34.2       09-06    133<L>  |  103  |  25<H>  ----------------------------<  69<L>  4.3   |  19<L>  |  0.61    Ca    9.3      06 Sep 2021 07:14  Phos  3.8     09-06  Mg     2.1     09-06    TPro  7.7  /  Alb  2.4<L>  /  TBili  0.3  /  DBili  x   /  AST  144<H>  /  ALT  189<H>  /  AlkPhos  65  09-06 SUBJECTIVE:  Pain controlled. No new complaints. Denies fevers, shortness of breath, or chest pain.    OBJECTIVE:  Vital Signs Last 24 Hrs  T(C): 37 (07 Sep 2021 00:30), Max: 37.1 (06 Sep 2021 23:30)  T(F): 98.6 (07 Sep 2021 00:30), Max: 98.7 (06 Sep 2021 23:30)  HR: 75 (07 Sep 2021 00:30) (75 - 100)  BP: 116/72 (07 Sep 2021 00:30) (110/70 - 122/73)  RR: 18 (07 Sep 2021 00:30) (18 - 18)  SpO2: 98% (07 Sep 2021 00:30) (97% - 99%)    Physical Examination:  GEN: NAD, resting quietly  NEURO: AAOx3, CN II-XII grossly intact, no focal deficits  PULM: symmetric chest rise bilaterally, no increased WOB  EXTR: no LE erythema, moving extremities although weak in proximal LE  VASC: LE warm and well-perfused      LABS:                        10.8   10.68 )-----------( 335      ( 06 Sep 2021 07:14 )             34.2       09-06    133<L>  |  103  |  25<H>  ----------------------------<  69<L>  4.3   |  19<L>  |  0.61    Ca    9.3      06 Sep 2021 07:14  Phos  3.8     09-06  Mg     2.1     09-06    TPro  7.7  /  Alb  2.4<L>  /  TBili  0.3  /  DBili  x   /  AST  144<H>  /  ALT  189<H>  /  AlkPhos  65  09-06

## 2021-09-07 NOTE — PROGRESS NOTE ADULT - PROBLEM SELECTOR PLAN 3
Normal MCV. Suspect AoCD.  - Hgb 10.2  - LDH elevated, haptoglobin and bili wnl  - b12/folate wnl  - iron 8% sat, serum iron 13, TIBC decreased, ferritin elevated 704 from 538  - continue to monitor

## 2021-09-08 ENCOUNTER — TRANSCRIPTION ENCOUNTER (OUTPATIENT)
Age: 61
End: 2021-09-08

## 2021-09-08 PROBLEM — E11.9 TYPE 2 DIABETES MELLITUS WITHOUT COMPLICATIONS: Chronic | Status: ACTIVE | Noted: 2021-08-03

## 2021-09-08 LAB
ALBUMIN SERPL ELPH-MCNC: 2.6 G/DL — LOW (ref 3.3–5)
ALP SERPL-CCNC: 64 U/L — SIGNIFICANT CHANGE UP (ref 40–120)
ALT FLD-CCNC: 198 U/L — HIGH (ref 10–45)
ANION GAP SERPL CALC-SCNC: 10 MMOL/L — SIGNIFICANT CHANGE UP (ref 5–17)
APTT BLD: 25.9 SEC — LOW (ref 27.5–35.5)
AST SERPL-CCNC: 141 U/L — HIGH (ref 10–40)
BILIRUB SERPL-MCNC: 0.4 MG/DL — SIGNIFICANT CHANGE UP (ref 0.2–1.2)
BUN SERPL-MCNC: 19 MG/DL — SIGNIFICANT CHANGE UP (ref 7–23)
CALCIUM SERPL-MCNC: 9.3 MG/DL — SIGNIFICANT CHANGE UP (ref 8.4–10.5)
CHLORIDE SERPL-SCNC: 98 MMOL/L — SIGNIFICANT CHANGE UP (ref 96–108)
CK SERPL-CCNC: 1446 U/L — HIGH (ref 30–200)
CO2 SERPL-SCNC: 26 MMOL/L — SIGNIFICANT CHANGE UP (ref 22–31)
CREAT SERPL-MCNC: 0.51 MG/DL — SIGNIFICANT CHANGE UP (ref 0.5–1.3)
GLUCOSE BLDC GLUCOMTR-MCNC: 144 MG/DL — HIGH (ref 70–99)
GLUCOSE BLDC GLUCOMTR-MCNC: 169 MG/DL — HIGH (ref 70–99)
GLUCOSE BLDC GLUCOMTR-MCNC: 195 MG/DL — HIGH (ref 70–99)
GLUCOSE BLDC GLUCOMTR-MCNC: 314 MG/DL — HIGH (ref 70–99)
GLUCOSE SERPL-MCNC: 131 MG/DL — HIGH (ref 70–99)
HCT VFR BLD CALC: 33 % — LOW (ref 39–50)
HGB BLD-MCNC: 10.5 G/DL — LOW (ref 13–17)
INR BLD: 0.92 RATIO — SIGNIFICANT CHANGE UP (ref 0.88–1.16)
INTERPRETATION 24H UR IFE-IMP: SIGNIFICANT CHANGE UP
INTERPRETATION 24H UR IFE-IMP: SIGNIFICANT CHANGE UP
MAGNESIUM SERPL-MCNC: 2.1 MG/DL — SIGNIFICANT CHANGE UP (ref 1.6–2.6)
MCHC RBC-ENTMCNC: 27.2 PG — SIGNIFICANT CHANGE UP (ref 27–34)
MCHC RBC-ENTMCNC: 31.8 GM/DL — LOW (ref 32–36)
MCV RBC AUTO: 85.5 FL — SIGNIFICANT CHANGE UP (ref 80–100)
NRBC # BLD: 0 /100 WBCS — SIGNIFICANT CHANGE UP (ref 0–0)
PHOSPHATE SERPL-MCNC: 3.4 MG/DL — SIGNIFICANT CHANGE UP (ref 2.5–4.5)
PLATELET # BLD AUTO: 320 K/UL — SIGNIFICANT CHANGE UP (ref 150–400)
POTASSIUM SERPL-MCNC: 4 MMOL/L — SIGNIFICANT CHANGE UP (ref 3.5–5.3)
POTASSIUM SERPL-SCNC: 4 MMOL/L — SIGNIFICANT CHANGE UP (ref 3.5–5.3)
PROT SERPL-MCNC: 7.6 G/DL — SIGNIFICANT CHANGE UP (ref 6–8.3)
PROTHROM AB SERPL-ACNC: 11.1 SEC — SIGNIFICANT CHANGE UP (ref 10.6–13.6)
RBC # BLD: 3.86 M/UL — LOW (ref 4.2–5.8)
RBC # FLD: 13.6 % — SIGNIFICANT CHANGE UP (ref 10.3–14.5)
SODIUM SERPL-SCNC: 134 MMOL/L — LOW (ref 135–145)
WBC # BLD: 11.13 K/UL — HIGH (ref 3.8–10.5)
WBC # FLD AUTO: 11.13 K/UL — HIGH (ref 3.8–10.5)

## 2021-09-08 PROCEDURE — 99232 SBSQ HOSP IP/OBS MODERATE 35: CPT | Mod: GC

## 2021-09-08 PROCEDURE — 99233 SBSQ HOSP IP/OBS HIGH 50: CPT

## 2021-09-08 RX ORDER — HUMAN INSULIN 100 [IU]/ML
5 INJECTION, SUSPENSION SUBCUTANEOUS ONCE
Refills: 0 | Status: COMPLETED | OUTPATIENT
Start: 2021-09-08 | End: 2021-09-08

## 2021-09-08 RX ADMIN — Medication 1 TABLET(S): at 11:14

## 2021-09-08 RX ADMIN — Medication 4: at 11:44

## 2021-09-08 RX ADMIN — PANTOPRAZOLE SODIUM 40 MILLIGRAM(S): 20 TABLET, DELAYED RELEASE ORAL at 06:03

## 2021-09-08 RX ADMIN — Medication 4 UNIT(S): at 11:44

## 2021-09-08 RX ADMIN — Medication 0: at 16:32

## 2021-09-08 RX ADMIN — Medication 4 UNIT(S): at 16:32

## 2021-09-08 RX ADMIN — Medication 24 MILLIGRAM(S): at 06:03

## 2021-09-08 RX ADMIN — Medication 1 TABLET(S): at 12:12

## 2021-09-08 RX ADMIN — Medication 1000 UNIT(S): at 11:14

## 2021-09-08 RX ADMIN — Medication 1: at 08:00

## 2021-09-08 RX ADMIN — Medication 24 MILLIGRAM(S): at 16:34

## 2021-09-08 RX ADMIN — Medication 100 MILLIGRAM(S): at 11:14

## 2021-09-08 RX ADMIN — Medication 81 MILLIGRAM(S): at 11:14

## 2021-09-08 NOTE — CHART NOTE - NSCHARTNOTEFT_GEN_A_CORE
New order received for clinical bedside swallow evaluation. Pt currently on SLP service.     61 year old man with PMH DM2 presents with 5 month history of weight loss, proximal muscle weakness, likely neuromuscular dysphagia found to have very elevated troponin levels and normal EKG. Recent Echo and EGD mostly within normal limits. Outpatient labs with elevations in CK, troponin, aldolase, and CRP but normal TSH, SAMIA, antibodies for smith, RNP, centromere, scleroderma, and dsDNA. Overall picture favors inflammatory myopathy; without obvious rash favor polymyositis vs inclusion body myositis vs necrotizing autoimmune myositis.  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 Rheumatology following-  s/p pulse dose solumedrol x3, solumedrol IV 60 daily, IVIG x5 days (finished 9/6), c/w medrol 24mg BID, c/w Bactrim for PCP ppx, Protonix for GI ppx. Pt pending muscle biopsy with general surgery tomorrow 9/9.     Pt being followed by this service for dysphagia. Pt s/p MBS on 9/2 with recs for NPO, with non-oral nutrition/hydration/medications. However, patient stated he wishes to continue on an oral diet despite the risks/complications of aspiration. Since Pt with wishes to continue on oral diet, SLP provided safe feeding strategies and Pt was educated that these strategies may reduce (but not eliminate) the risk of aspiration. Pt was also provided therapeutic exercises to target oral and pharyngeal deficits.     New order received for swallow evaluation today due to Pt wishes for repeat assessment to determine if there has been an improvement in swallow function s/p course of IVIG. Pt encountered awake and alert, upright in bed on room air. HOB elevated. SLP discussed previous MBS findings with patient. Pt endorsed slighjt improvement in overall swallow function and improved PO intake. Pt educated that Pt's self report of symptoms were consistent with findings on modified barium swallow study. Therefore, given endorsed improvement, it is likely swallow function has improved. A repeat objective assessment will not change clinical management given Pt wishes to continue on an oral diet despite findings of MBS. Pt participated in dysphagia exercises targeting patient's specific oropharyngeal deficits.     -Purposeful proactive rounding reinforced and 5 Ps addressed. Pt left in no distress. D/W Patient and ACP    -Recommendations:   1) Repeat objective assessment is unlikely to change clinical management  2) Aspiration precautions  3) Diligent oral hygiene  4) Swallow tx while in house  5) Outpatient vs home SLP services post discharge.     Estephanie Kaur MS CCC-SLP  Speech-Language Pathologist  ; [phone]

## 2021-09-08 NOTE — PROGRESS NOTE ADULT - PROBLEM SELECTOR PLAN 4
To solids and liquids and without significant symptoms of dyspepsia but with occasional regurgitation. No issue with initiating swallow but does have cough. favor esophageal > oropharyngeal dysphagia. s/p normal outpatient EGD.  Failed bedside swallow eval.  - likely neuromuscular in etiology  - oropharyngeal dysphagia per MBS, S&S recommended keep NPO with replacement hydration  - patient refused NGT or PEG, insists on diet despite knowing risk of aspiration  - if aspiration PNA suspected, start zosyn  - repeat S/S before dc To solids and liquids and without significant symptoms of dyspepsia but with occasional regurgitation. No issue with initiating swallow but does have cough. favor esophageal > oropharyngeal dysphagia. s/p normal outpatient EGD.  Failed bedside swallow eval.  - likely neuromuscular in etiology  - oropharyngeal dysphagia per MBS, S&S recommended keep NPO with replacement hydration  - patient refused NGT or PEG, insists on diet despite knowing risk of aspiration  - if aspiration PNA suspected, start zosyn

## 2021-09-08 NOTE — PROGRESS NOTE ADULT - PROBLEM SELECTOR PLAN 3
Normal MCV. Suspect AoCD.  - Hgb 10.5  - LDH elevated, haptoglobin and bili wnl  - b12/folate wnl  - iron 8% sat, serum iron 13, TIBC decreased, ferritin elevated 704 from 538  - continue to monitor

## 2021-09-08 NOTE — PROGRESS NOTE ADULT - SUBJECTIVE AND OBJECTIVE BOX
Vascular Cardiology Consult Note    SERVICE CONSULT: [phone]        OFFICE [phone]    CC: TIJERINA / Muscle Weakness / Dysphagia    Interval Events:  Muscle biopsy rescheduled to tomorrow.   No C/P or SOB.  No LE pain or swelling.    Allergies  No Known Allergies    MEDICATIONS  (STANDING):  aspirin enteric coated 81 milliGRAM(s) Oral daily  cholecalciferol 1000 Unit(s) Oral daily  glucagon  Injectable 1 milliGRAM(s) IntraMuscular once  insulin glargine Injectable (LANTUS) 6 Unit(s) SubCutaneous at bedtime  insulin lispro (ADMELOG) corrective regimen sliding scale   SubCutaneous three times a day before meals  insulin lispro (ADMELOG) corrective regimen sliding scale   SubCutaneous at bedtime  insulin lispro Injectable (ADMELOG) 4 Unit(s) SubCutaneous three times a day before meals  insulin NPH human recombinant 5 Unit(s) SubCutaneous once  lactated ringers. 1000 milliLiter(s) (75 mL/Hr) IV Continuous <Continuous>  methylPREDNISolone 24 milliGRAM(s) Oral two times a day  multivitamin 1 Tablet(s) Oral daily  pantoprazole    Tablet 40 milliGRAM(s) Oral before breakfast  thiamine 100 milliGRAM(s) Oral daily  trimethoprim  160 mG/sulfamethoxazole 800 mG 1 Tablet(s) Oral daily    PAST MEDICAL & SURGICAL HISTORY:  Type 2 diabetes mellitus  History of esophagogastroduodenoscopy (EGD)    FAMILY HISTORY:  FH: leukemia (Father)    SOCIAL HISTORY:  unchanged    REVIEW OF SYSTEMS:  CONSTITUTIONAL: No fever  EYES: No eye pain  ENT:  No throat pain  NECK: No pain  RESPIRATORY: TIJERINA  CARDIOVASCULAR: No C/P   GASTROINTESTINAL: No abdominal pain  GENITOURINARY: No hematuria  NEUROLOGICAL: loss of strength  SKIN: rash to L UE  LYMPH Nodes: No enlarged glands noted  ENDOCRINE: No heat or cold intolerance noted  MUSCULOSKELETAL: No swelling or extremity pain  PSYCHIATRIC: No depression, anxiety  HEME/LYMPH: No  bleeding gums  ALLERGY AND IMMUNOLOGIC: No hives    [ x] All others negative	    PHYSICAL EXAM:  Vital Signs Last 24 Hrs  T(C): 36.6 (08 Sep 2021 11:13), Max: 36.6 (08 Sep 2021 11:13)  T(F): 97.9 (08 Sep 2021 11:13), Max: 97.9 (08 Sep 2021 11:13)  HR: 103 (08 Sep 2021 11:13) (77 - 103)  BP: 107/70 (08 Sep 2021 11:13) (107/70 - 112/71)  RR: 18 (08 Sep 2021 11:13) (18 - 18)  SpO2: 97% (08 Sep 2021 11:13) (96% - 98%)    Appearance:  	  HEENT:  NC/AT  Cardiovascular: RRR, S1 and S2   Respiratory: CTA B/L  Psychiatry:  AAO x 3  Gastrointestinal:  Soft, Non-tender, + BS	  Skin: No ecchymoses, No cyanosis	  Neurologic: No focal deficits noted  Extremities: No LE edema, B/L calves soft  Foot Exam: No tissue loss or ulceration  R Radial Site: C/D/I, Radial Pulse 2+    LABS:	 	                        10.5   11.13 )-----------( 320      ( 08 Sep 2021 06:36 )             33.0     09-08    134<L>  |  98  |  19  ----------------------------<  131<H>  4.0   |  26  |  0.51    Ca    9.3      08 Sep 2021 06:36  Phos  3.4     09-08  Mg     2.1     09-08    TPro  7.6  /  Alb  2.6<L>  /  TBili  0.4  /  DBili  x   /  AST  141<H>  /  ALT  198<H>  /  AlkPhos  64  09-08    PT/INR - ( 08 Sep 2021 06:36 )   PT: 11.1 sec;   INR: 0.92 ratio    PTT - ( 08 Sep 2021 06:36 )  PTT:25.9 sec    CARDIAC MARKERS ( 08 Sep 2021 06:36 )  x     / x     / 1446 U/L / x     / x      CARDIAC MARKERS ( 07 Sep 2021 06:37 )  x     / x     / 1717 U/L / x     / x

## 2021-09-08 NOTE — PROGRESS NOTE ADULT - SUBJECTIVE AND OBJECTIVE BOX
**************************  Michelle Meier  PGY-1 Internal Medicine  [phone]  **************************    Patient is a 61y old  Male who presents with a chief complaint of Myositis (08 Sep 2021 02:12)      SUBJECTIVE / OVERNIGHT EVENTS:  This morning, patient is extremely frustrated. His muscle biopsy was cancelled yesterday and scheduled for tomorrow. Has not walked around the unit in 2 days, is frustrated and tired. This morning, reports he still feels stronger in his lower extremities than his upper extremities. No difficulties swallowing, but he has not eaten much since he was NPO for the cancelled procedure.    MEDICATIONS  (STANDING):  aspirin enteric coated 81 milliGRAM(s) Oral daily  cholecalciferol 1000 Unit(s) Oral daily  dextrose 40% Gel 15 Gram(s) Oral once  dextrose 5% + lactated ringers 1000 milliLiter(s) (70 mL/Hr) IV Continuous <Continuous>  dextrose 5%. 1000 milliLiter(s) (50 mL/Hr) IV Continuous <Continuous>  dextrose 5%. 1000 milliLiter(s) (100 mL/Hr) IV Continuous <Continuous>  dextrose 50% Injectable 25 Gram(s) IV Push once  dextrose 50% Injectable 12.5 Gram(s) IV Push once  dextrose 50% Injectable 25 Gram(s) IV Push once  glucagon  Injectable 1 milliGRAM(s) IntraMuscular once  insulin glargine Injectable (LANTUS) 6 Unit(s) SubCutaneous at bedtime  insulin lispro (ADMELOG) corrective regimen sliding scale   SubCutaneous three times a day before meals  insulin lispro (ADMELOG) corrective regimen sliding scale   SubCutaneous at bedtime  insulin lispro Injectable (ADMELOG) 4 Unit(s) SubCutaneous three times a day before meals  lactated ringers. 1000 milliLiter(s) (75 mL/Hr) IV Continuous <Continuous>  methylPREDNISolone 24 milliGRAM(s) Oral two times a day  multivitamin 1 Tablet(s) Oral daily  pantoprazole    Tablet 40 milliGRAM(s) Oral before breakfast  thiamine 100 milliGRAM(s) Oral daily  trimethoprim  160 mG/sulfamethoxazole 800 mG 1 Tablet(s) Oral daily    MEDICATIONS  (PRN):  acetaminophen   Tablet .. 650 milliGRAM(s) Oral every 6 hours PRN Mild Pain (1 - 3), Moderate Pain (4 - 6)      CAPILLARY BLOOD GLUCOSE    POCT Blood Glucose.: 169 mg/dL (08 Sep 2021 07:49)  POCT Blood Glucose.: 255 mg/dL (07 Sep 2021 21:11)  POCT Blood Glucose.: 166 mg/dL (07 Sep 2021 16:07)  POCT Blood Glucose.: 202 mg/dL (07 Sep 2021 11:26)    I&O's Summary    07 Sep 2021 07:01  -  08 Sep 2021 07:00  --------------------------------------------------------  IN: 960 mL / OUT: 1800 mL / NET: -840 mL      PHYSICAL EXAM:  Vital Signs Last 24 Hrs  T(C): 36.3 (08 Sep 2021 04:24), Max: 36.6 (07 Sep 2021 11:14)  T(F): 97.4 (08 Sep 2021 04:24), Max: 97.8 (07 Sep 2021 11:14)  HR: 77 (08 Sep 2021 04:24) (77 - 88)  BP: 109/67 (08 Sep 2021 04:24) (106/65 - 112/71)  BP(mean): --  RR: 18 (08 Sep 2021 04:24) (18 - 18)  SpO2: 98% (08 Sep 2021 04:24) (96% - 98%)    CONSTITUTIONAL: NAD, well-developed, visibly frustrated  RESPIRATORY: Normal respiratory effort; lungs are clear to auscultation bilaterally  CARDIOVASCULAR: Regular rate and rhythm, normal S1 and S2, no murmur/rub/gallop; No lower extremity edema; Peripheral pulses are 2+ bilaterally  ABDOMEN: Nontender to palpation, normoactive bowel sounds, no rebound/guarding; No hepatosplenomegaly  MUSCLOSKELETAL: proximal > distal muscle weakness, greater strength in lower than upper extremities  PSYCH: A+O to person, place, and time; affect appropriate    LABS:                        10.5   11.13 )-----------( 320      ( 08 Sep 2021 06:36 )             33.0     09-08    134<L>  |  98  |  19  ----------------------------<  131<H>  4.0   |  26  |  0.51    Ca    9.3      08 Sep 2021 06:36  Phos  3.4     09-08  Mg     2.1     09-08    TPro  7.6  /  Alb  2.6<L>  /  TBili  0.4  /  DBili  x   /  AST  141<H>  /  ALT  198<H>  /  AlkPhos  64  09-08    PT/INR - ( 08 Sep 2021 06:36 )   PT: 11.1 sec;   INR: 0.92 ratio         PTT - ( 08 Sep 2021 06:36 )  PTT:25.9 sec  CARDIAC MARKERS ( 08 Sep 2021 06:36 )  x     / x     / 1446 U/L / x     / x      CARDIAC MARKERS ( 07 Sep 2021 06:37 )  x     / x     / 1717 U/L / x     / x

## 2021-09-08 NOTE — PROGRESS NOTE ADULT - ASSESSMENT
ASSESSMENT: Patient is a 61y old m with DM2, p/w subacute proximal muscle weakness, weight loss, dysphagia and troponemia. Concern for polymyositis and cardiomyositis. Surgery consulted for muscle biopsy.    PLAN:   - Surgery team to perform muscle biopsy in OR, left thigh per MRI read  - Booked for OR Thurs 9/9  - Cardiology and medicine clearance noted  - NPO at midnight 9/9  - Care per primary team

## 2021-09-08 NOTE — PROGRESS NOTE ADULT - PROBLEM SELECTOR PLAN 1
Patients presentation of subacute-chronic weight loss, fatigue, proximal muscle weakness, (neuromuscular) dysphagia, and elevated troponins, CK, aldolase, CRP, and transaminases favor inflammatory myopathy. Without rash dermatomyositis is less likely. W/o any joint pain. Patient was prescribed statins upon discharge from [facility] but he did not report taking them and symptoms began prior to that point.   - CT c/a/p without masses or evidence of malignancy  - confirmed by MRI of left thigh, findings consistent with polymyositis  - Rheumatology following    - s/p pulse dose solumedrol x3, solumedrol IV 60 daily, IVIG x5 days (finished 9/6)    - c/w medrol 24mg BID    - c/w Bactrim for PCP ppx, Protonix for GI ppx    - lab panels negative thus far  - muscle biopsy with general surgery tomorrow 9/9

## 2021-09-08 NOTE — PROGRESS NOTE ADULT - SUBJECTIVE AND OBJECTIVE BOX
SUBJECTIVE:  Pain controlled. No new complaints. Denies fevers, shortness of breath, or chest pain.    OBJECTIVE:  Vital Signs Last 24 Hrs  T(C): 37 (07 Sep 2021 00:30), Max: 37.1 (06 Sep 2021 23:30)  T(F): 98.6 (07 Sep 2021 00:30), Max: 98.7 (06 Sep 2021 23:30)  HR: 75 (07 Sep 2021 00:30) (75 - 100)  BP: 116/72 (07 Sep 2021 00:30) (110/70 - 122/73)  RR: 18 (07 Sep 2021 00:30) (18 - 18)  SpO2: 98% (07 Sep 2021 00:30) (97% - 99%)    Physical Examination:  GEN: NAD, resting quietly  NEURO: AAOx3, CN II-XII grossly intact, no focal deficits  PULM: symmetric chest rise bilaterally, no increased WOB  EXTR: no LE erythema, moving extremities although weak in proximal LE  VASC: LE warm and well-perfused      LABS:                        10.8   10.68 )-----------( 335      ( 06 Sep 2021 07:14 )             34.2       09-06    133<L>  |  103  |  25<H>  ----------------------------<  69<L>  4.3   |  19<L>  |  0.61    Ca    9.3      06 Sep 2021 07:14  Phos  3.8     09-06  Mg     2.1     09-06    TPro  7.7  /  Alb  2.4<L>  /  TBili  0.3  /  DBili  x   /  AST  144<H>  /  ALT  189<H>  /  AlkPhos  65  09-06

## 2021-09-08 NOTE — PROGRESS NOTE ADULT - PROBLEM SELECTOR PLAN 5
Reports taking 10-12 U of daily insulin as well as janumet. reports that A1c had been poorly controlled in the past. Most recent on outpatient paperwork was 8.1. it is possible that patient's dysphagia is related to gastroparesis although less likely.  - A1C 7.2  - lantus 6 qhs, ademlog 4 mealtime  - low dose ISS  - Hold while NPO

## 2021-09-08 NOTE — PROGRESS NOTE ADULT - ASSESSMENT
Assessment:  1. Troponin T Elevated      No active C/P or SOB      Cardiac Cath 9/1: non-obstructive CAD  2. Myositis / CPK Elevated / Muscular Atrophy and Weakness  3. Dysphagia  4. Weight Loss   5. DM  6. Colon Thickening on prior CT  7. Anemia / Iron Deficiency Anemia     Plan:  1. Cardiac MRI results reviewed, negative for myocarditis.     Troponin and CPK now down-trending after steroids started.   2. Continue ASA for medical management of non-obstructive CAD.   3. No further cardiac or vascular testing is needed prior to muscle biopsy or colonoscopy.  4. While NPO, he should have continuous IV fluids due to the mid cavitary gradients on Echo.      Oral hydration encouraged in general.   5. Anemia evaluation and Iron Deficiency repletion as per Primary Team and GI.  6. Statin on hold due to elevated CPK and concern for myositis.      Rheumatology consultation appreciated.  7. Dysphagia evaluation by GI / ENT / Speech and Swallow appreciated.  8. PSA negative. Age appropriate CA screening with a colonoscopy.  9. Consider Neuro evaluation if needed.      Rheumatology confident that is highly likely to be myositis.   10. Discussed with Primary Team.    Thank you  JUAN Witt, MPAS  Vascular Cardiology Service    Please call with any questions:   Service Line: [phone]  Office [phone]   Assessment:  1. Troponin T Elevated      No active C/P or SOB      Cardiac Cath 9/1: non-obstructive CAD  2. Myositis / CPK Elevated / Muscular Atrophy and Weakness  3. Dysphagia  4. Weight Loss   5. DM  6. Colon Thickening on prior CT  7. Anemia / Iron Deficiency Anemia     Plan:  1. Cardiac MRI results reviewed, negative for myocarditis.     Troponin and CPK now down-trending after steroids started.   2. Continue ASA for medical management of non-obstructive CAD.   3. No further cardiac or vascular testing is needed prior to muscle biopsy or colonoscopy.  4. While NPO, he should have continuous IV fluids due to the mid cavitary gradients on Echo.      Oral hydration encouraged in general.   5. Anemia evaluation and Iron Deficiency repletion as per Primary Team and GI.  6. Statin on hold due to elevated CPK and concern for myositis.      Rheumatology consultation appreciated.  7. Dysphagia evaluation by GI / ENT / Speech and Swallow appreciated.  8. PSA negative. Age appropriate CA screening with a colonoscopy.  9. Consider Neuro evaluation if needed.      Rheumatology confident that is highly likely to be myositis.   10. Would not resume Janumet due to excessive weight loss and h/o recurrent CPK elevation.        Consider alternate regimen. He is on insulin at home if would like to adjust.   10. Discussed with Primary Team.    Thank you  JUAN Witt, MPAS  Vascular Cardiology Service    Please call with any questions:   Service Line: [phone]  Office [phone]

## 2021-09-08 NOTE — PROGRESS NOTE ADULT - PROBLEM SELECTOR PLAN 2
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 Troponins began downtrending significantly with steroids.   -suspect elevation in the setting of myositis vs myocarditis  - ECHO with hyperdynamic EF=75-80%, mild diastolic dysfunction grade I  - LHC w/ nonobstructive CAD  - c/w low dose aspirin  - continue to hold statins in the setting of myopathy and elevated AST/ALT  - c/w telemetry monitoring, NSR  - cMRI 9/7 to investigate myocarditis (COVID vaccine one month ago) -> wnl, EF 84%

## 2021-09-09 ENCOUNTER — TRANSCRIPTION ENCOUNTER (OUTPATIENT)
Age: 61
End: 2021-09-09

## 2021-09-09 ENCOUNTER — RESULT REVIEW (OUTPATIENT)
Age: 61
End: 2021-09-09

## 2021-09-09 VITALS
HEART RATE: 83 BPM | TEMPERATURE: 98 F | DIASTOLIC BLOOD PRESSURE: 76 MMHG | SYSTOLIC BLOOD PRESSURE: 129 MMHG | RESPIRATION RATE: 18 BRPM | OXYGEN SATURATION: 100 %

## 2021-09-09 LAB
ALBUMIN SERPL ELPH-MCNC: 2.9 G/DL — LOW (ref 3.3–5)
ALP SERPL-CCNC: 61 U/L — SIGNIFICANT CHANGE UP (ref 40–120)
ALT FLD-CCNC: 221 U/L — HIGH (ref 10–45)
ANION GAP SERPL CALC-SCNC: 10 MMOL/L — SIGNIFICANT CHANGE UP (ref 5–17)
APTT BLD: 24.5 SEC — LOW (ref 27.5–35.5)
AST SERPL-CCNC: 142 U/L — HIGH (ref 10–40)
BASOPHILS # BLD AUTO: 0.01 K/UL — SIGNIFICANT CHANGE UP (ref 0–0.2)
BASOPHILS NFR BLD AUTO: 0.1 % — SIGNIFICANT CHANGE UP (ref 0–2)
BILIRUB SERPL-MCNC: 0.4 MG/DL — SIGNIFICANT CHANGE UP (ref 0.2–1.2)
BUN SERPL-MCNC: 16 MG/DL — SIGNIFICANT CHANGE UP (ref 7–23)
CALCIUM SERPL-MCNC: 9.4 MG/DL — SIGNIFICANT CHANGE UP (ref 8.4–10.5)
CHLORIDE SERPL-SCNC: 99 MMOL/L — SIGNIFICANT CHANGE UP (ref 96–108)
CO2 SERPL-SCNC: 28 MMOL/L — SIGNIFICANT CHANGE UP (ref 22–31)
CREAT SERPL-MCNC: 0.48 MG/DL — LOW (ref 0.5–1.3)
EOSINOPHIL # BLD AUTO: 0.01 K/UL — SIGNIFICANT CHANGE UP (ref 0–0.5)
EOSINOPHIL NFR BLD AUTO: 0.1 % — SIGNIFICANT CHANGE UP (ref 0–6)
GLUCOSE BLDC GLUCOMTR-MCNC: 155 MG/DL — HIGH (ref 70–99)
GLUCOSE BLDC GLUCOMTR-MCNC: 241 MG/DL — HIGH (ref 70–99)
GLUCOSE BLDC GLUCOMTR-MCNC: 266 MG/DL — HIGH (ref 70–99)
GLUCOSE SERPL-MCNC: 177 MG/DL — HIGH (ref 70–99)
HCT VFR BLD CALC: 36.8 % — LOW (ref 39–50)
HGB BLD-MCNC: 11.7 G/DL — LOW (ref 13–17)
IMM GRANULOCYTES NFR BLD AUTO: 0.5 % — SIGNIFICANT CHANGE UP (ref 0–1.5)
INR BLD: 0.97 RATIO — SIGNIFICANT CHANGE UP (ref 0.88–1.16)
LYMPHOCYTES # BLD AUTO: 1.26 K/UL — SIGNIFICANT CHANGE UP (ref 1–3.3)
LYMPHOCYTES # BLD AUTO: 9.8 % — LOW (ref 13–44)
MAGNESIUM SERPL-MCNC: 2 MG/DL — SIGNIFICANT CHANGE UP (ref 1.6–2.6)
MCHC RBC-ENTMCNC: 27.5 PG — SIGNIFICANT CHANGE UP (ref 27–34)
MCHC RBC-ENTMCNC: 31.8 GM/DL — LOW (ref 32–36)
MCV RBC AUTO: 86.4 FL — SIGNIFICANT CHANGE UP (ref 80–100)
MONOCYTES # BLD AUTO: 0.64 K/UL — SIGNIFICANT CHANGE UP (ref 0–0.9)
MONOCYTES NFR BLD AUTO: 5 % — SIGNIFICANT CHANGE UP (ref 2–14)
NEUTROPHILS # BLD AUTO: 10.82 K/UL — HIGH (ref 1.8–7.4)
NEUTROPHILS NFR BLD AUTO: 84.5 % — HIGH (ref 43–77)
NRBC # BLD: 0 /100 WBCS — SIGNIFICANT CHANGE UP (ref 0–0)
PHOSPHATE SERPL-MCNC: 3.2 MG/DL — SIGNIFICANT CHANGE UP (ref 2.5–4.5)
PLATELET # BLD AUTO: 337 K/UL — SIGNIFICANT CHANGE UP (ref 150–400)
POTASSIUM SERPL-MCNC: 4.7 MMOL/L — SIGNIFICANT CHANGE UP (ref 3.5–5.3)
POTASSIUM SERPL-SCNC: 4.7 MMOL/L — SIGNIFICANT CHANGE UP (ref 3.5–5.3)
PROT SERPL-MCNC: 7.6 G/DL — SIGNIFICANT CHANGE UP (ref 6–8.3)
PROTHROM AB SERPL-ACNC: 11.6 SEC — SIGNIFICANT CHANGE UP (ref 10.6–13.6)
RBC # BLD: 4.26 M/UL — SIGNIFICANT CHANGE UP (ref 4.2–5.8)
RBC # FLD: 13.9 % — SIGNIFICANT CHANGE UP (ref 10.3–14.5)
SARS-COV-2 RNA SPEC QL NAA+PROBE: SIGNIFICANT CHANGE UP
SODIUM SERPL-SCNC: 137 MMOL/L — SIGNIFICANT CHANGE UP (ref 135–145)
WBC # BLD: 12.81 K/UL — HIGH (ref 3.8–10.5)
WBC # FLD AUTO: 12.81 K/UL — HIGH (ref 3.8–10.5)

## 2021-09-09 PROCEDURE — 82607 VITAMIN B-12: CPT

## 2021-09-09 PROCEDURE — 86140 C-REACTIVE PROTEIN: CPT

## 2021-09-09 PROCEDURE — 86663 EPSTEIN-BARR ANTIBODY: CPT

## 2021-09-09 PROCEDURE — 88305 TISSUE EXAM BY PATHOLOGIST: CPT

## 2021-09-09 PROCEDURE — 93005 ELECTROCARDIOGRAM TRACING: CPT

## 2021-09-09 PROCEDURE — 86335 IMMUNFIX E-PHORSIS/URINE/CSF: CPT

## 2021-09-09 PROCEDURE — 86900 BLOOD TYPING SEROLOGIC ABO: CPT

## 2021-09-09 PROCEDURE — 99239 HOSP IP/OBS DSCHRG MGMT >30: CPT | Mod: GC

## 2021-09-09 PROCEDURE — C1769: CPT

## 2021-09-09 PROCEDURE — 83540 ASSAY OF IRON: CPT

## 2021-09-09 PROCEDURE — 88313 SPECIAL STAINS GROUP 2: CPT

## 2021-09-09 PROCEDURE — 86747 PARVOVIRUS ANTIBODY: CPT

## 2021-09-09 PROCEDURE — 99285 EMERGENCY DEPT VISIT HI MDM: CPT | Mod: 25

## 2021-09-09 PROCEDURE — 99152 MOD SED SAME PHYS/QHP 5/>YRS: CPT

## 2021-09-09 PROCEDURE — 86038 ANTINUCLEAR ANTIBODIES: CPT

## 2021-09-09 PROCEDURE — U0003: CPT

## 2021-09-09 PROCEDURE — 83735 ASSAY OF MAGNESIUM: CPT

## 2021-09-09 PROCEDURE — 83516 IMMUNOASSAY NONANTIBODY: CPT

## 2021-09-09 PROCEDURE — 85730 THROMBOPLASTIN TIME PARTIAL: CPT

## 2021-09-09 PROCEDURE — 86235 NUCLEAR ANTIGEN ANTIBODY: CPT

## 2021-09-09 PROCEDURE — 88313 SPECIAL STAINS GROUP 2: CPT | Mod: 26

## 2021-09-09 PROCEDURE — 82784 ASSAY IGA/IGD/IGG/IGM EACH: CPT

## 2021-09-09 PROCEDURE — U0005: CPT

## 2021-09-09 PROCEDURE — 81001 URINALYSIS AUTO W/SCOPE: CPT

## 2021-09-09 PROCEDURE — 86334 IMMUNOFIX E-PHORESIS SERUM: CPT

## 2021-09-09 PROCEDURE — 86665 EPSTEIN-BARR CAPSID VCA: CPT

## 2021-09-09 PROCEDURE — 82746 ASSAY OF FOLIC ACID SERUM: CPT

## 2021-09-09 PROCEDURE — 84484 ASSAY OF TROPONIN QUANT: CPT

## 2021-09-09 PROCEDURE — 80048 BASIC METABOLIC PNL TOTAL CA: CPT

## 2021-09-09 PROCEDURE — 85652 RBC SED RATE AUTOMATED: CPT

## 2021-09-09 PROCEDURE — 73720 MRI LWR EXTREMITY W/O&W/DYE: CPT

## 2021-09-09 PROCEDURE — 82553 CREATINE MB FRACTION: CPT

## 2021-09-09 PROCEDURE — 84100 ASSAY OF PHOSPHORUS: CPT

## 2021-09-09 PROCEDURE — G0103: CPT

## 2021-09-09 PROCEDURE — 84155 ASSAY OF PROTEIN SERUM: CPT

## 2021-09-09 PROCEDURE — 74177 CT ABD & PELVIS W/CONTRAST: CPT

## 2021-09-09 PROCEDURE — A9585: CPT

## 2021-09-09 PROCEDURE — 93458 L HRT ARTERY/VENTRICLE ANGIO: CPT

## 2021-09-09 PROCEDURE — 86431 RHEUMATOID FACTOR QUANT: CPT

## 2021-09-09 PROCEDURE — 71045 X-RAY EXAM CHEST 1 VIEW: CPT

## 2021-09-09 PROCEDURE — 74230 X-RAY XM SWLNG FUNCJ C+: CPT

## 2021-09-09 PROCEDURE — 82306 VITAMIN D 25 HYDROXY: CPT

## 2021-09-09 PROCEDURE — 92610 EVALUATE SWALLOWING FUNCTION: CPT

## 2021-09-09 PROCEDURE — 86366 MUSCLE-SPECIFIC KINASE ANTB: CPT

## 2021-09-09 PROCEDURE — C1887: CPT

## 2021-09-09 PROCEDURE — 86706 HEP B SURFACE ANTIBODY: CPT

## 2021-09-09 PROCEDURE — 86480 TB TEST CELL IMMUN MEASURE: CPT

## 2021-09-09 PROCEDURE — 86704 HEP B CORE ANTIBODY TOTAL: CPT

## 2021-09-09 PROCEDURE — 86769 SARS-COV-2 COVID-19 ANTIBODY: CPT

## 2021-09-09 PROCEDURE — 80074 ACUTE HEPATITIS PANEL: CPT

## 2021-09-09 PROCEDURE — 93306 TTE W/DOPPLER COMPLETE: CPT

## 2021-09-09 PROCEDURE — 84156 ASSAY OF PROTEIN URINE: CPT

## 2021-09-09 PROCEDURE — 82550 ASSAY OF CK (CPK): CPT

## 2021-09-09 PROCEDURE — 82728 ASSAY OF FERRITIN: CPT

## 2021-09-09 PROCEDURE — 83550 IRON BINDING TEST: CPT

## 2021-09-09 PROCEDURE — 88305 TISSUE EXAM BY PATHOLOGIST: CPT | Mod: 26

## 2021-09-09 PROCEDURE — 84443 ASSAY THYROID STIM HORMONE: CPT

## 2021-09-09 PROCEDURE — 75561 CARDIAC MRI FOR MORPH W/DYE: CPT

## 2021-09-09 PROCEDURE — 82962 GLUCOSE BLOOD TEST: CPT

## 2021-09-09 PROCEDURE — 86664 EPSTEIN-BARR NUCLEAR ANTIGEN: CPT

## 2021-09-09 PROCEDURE — 85027 COMPLETE CBC AUTOMATED: CPT

## 2021-09-09 PROCEDURE — 85610 PROTHROMBIN TIME: CPT

## 2021-09-09 PROCEDURE — 85045 AUTOMATED RETICULOCYTE COUNT: CPT

## 2021-09-09 PROCEDURE — 84466 ASSAY OF TRANSFERRIN: CPT

## 2021-09-09 PROCEDURE — 88319 ENZYME HISTOCHEMISTRY: CPT

## 2021-09-09 PROCEDURE — 84165 PROTEIN E-PHORESIS SERUM: CPT

## 2021-09-09 PROCEDURE — 83880 ASSAY OF NATRIURETIC PEPTIDE: CPT

## 2021-09-09 PROCEDURE — 92526 ORAL FUNCTION THERAPY: CPT

## 2021-09-09 PROCEDURE — 88314 HISTOCHEMICAL STAINS ADD-ON: CPT | Mod: 26

## 2021-09-09 PROCEDURE — 97165 OT EVAL LOW COMPLEX 30 MIN: CPT

## 2021-09-09 PROCEDURE — C1894: CPT

## 2021-09-09 PROCEDURE — 97161 PT EVAL LOW COMPLEX 20 MIN: CPT

## 2021-09-09 PROCEDURE — 80053 COMPREHEN METABOLIC PANEL: CPT

## 2021-09-09 PROCEDURE — 86850 RBC ANTIBODY SCREEN: CPT

## 2021-09-09 PROCEDURE — 85025 COMPLETE CBC W/AUTO DIFF WBC: CPT

## 2021-09-09 PROCEDURE — 74220 X-RAY XM ESOPHAGUS 1CNTRST: CPT

## 2021-09-09 PROCEDURE — 86901 BLOOD TYPING SEROLOGIC RH(D): CPT

## 2021-09-09 PROCEDURE — 82085 ASSAY OF ALDOLASE: CPT

## 2021-09-09 PROCEDURE — 86041 ACETYLCHOLN RCPTR BNDNG ANTB: CPT

## 2021-09-09 PROCEDURE — 88319 ENZYME HISTOCHEMISTRY: CPT | Mod: 26

## 2021-09-09 PROCEDURE — 92611 MOTION FLUOROSCOPY/SWALLOW: CPT

## 2021-09-09 PROCEDURE — 83615 LACTATE (LD) (LDH) ENZYME: CPT

## 2021-09-09 PROCEDURE — 88314 HISTOCHEMICAL STAINS ADD-ON: CPT

## 2021-09-09 PROCEDURE — 83010 ASSAY OF HAPTOGLOBIN QUANT: CPT

## 2021-09-09 PROCEDURE — 83036 HEMOGLOBIN GLYCOSYLATED A1C: CPT

## 2021-09-09 PROCEDURE — 84166 PROTEIN E-PHORESIS/URINE/CSF: CPT

## 2021-09-09 PROCEDURE — 71260 CT THORAX DX C+: CPT

## 2021-09-09 PROCEDURE — 86803 HEPATITIS C AB TEST: CPT

## 2021-09-09 PROCEDURE — 87389 HIV-1 AG W/HIV-1&-2 AB AG IA: CPT

## 2021-09-09 RX ORDER — SODIUM CHLORIDE 9 MG/ML
1000 INJECTION, SOLUTION INTRAVENOUS
Refills: 0 | Status: DISCONTINUED | OUTPATIENT
Start: 2021-09-09 | End: 2021-09-09

## 2021-09-09 RX ORDER — THIAMINE MONONITRATE (VIT B1) 100 MG
100 TABLET ORAL DAILY
Refills: 0 | Status: DISCONTINUED | OUTPATIENT
Start: 2021-09-09 | End: 2021-09-09

## 2021-09-09 RX ORDER — GLUCAGON INJECTION, SOLUTION 0.5 MG/.1ML
1 INJECTION, SOLUTION SUBCUTANEOUS ONCE
Refills: 0 | Status: DISCONTINUED | OUTPATIENT
Start: 2021-09-09 | End: 2021-09-09

## 2021-09-09 RX ORDER — ACETAMINOPHEN 500 MG
2 TABLET ORAL
Qty: 56 | Refills: 0
Start: 2021-09-09 | End: 2021-09-15

## 2021-09-09 RX ORDER — HYDROMORPHONE HYDROCHLORIDE 2 MG/ML
0.25 INJECTION INTRAMUSCULAR; INTRAVENOUS; SUBCUTANEOUS
Refills: 0 | Status: DISCONTINUED | OUTPATIENT
Start: 2021-09-09 | End: 2021-09-09

## 2021-09-09 RX ORDER — CHOLECALCIFEROL (VITAMIN D3) 125 MCG
1000 CAPSULE ORAL
Qty: 8 | Refills: 0
Start: 2021-09-09 | End: 2021-09-16

## 2021-09-09 RX ORDER — DEXTROSE 50 % IN WATER 50 %
25 SYRINGE (ML) INTRAVENOUS ONCE
Refills: 0 | Status: DISCONTINUED | OUTPATIENT
Start: 2021-09-09 | End: 2021-09-09

## 2021-09-09 RX ORDER — ONDANSETRON 8 MG/1
4 TABLET, FILM COATED ORAL ONCE
Refills: 0 | Status: DISCONTINUED | OUTPATIENT
Start: 2021-09-09 | End: 2021-09-09

## 2021-09-09 RX ORDER — INSULIN LISPRO 100/ML
VIAL (ML) SUBCUTANEOUS AT BEDTIME
Refills: 0 | Status: DISCONTINUED | OUTPATIENT
Start: 2021-09-09 | End: 2021-09-09

## 2021-09-09 RX ORDER — IBUPROFEN 200 MG
2 TABLET ORAL
Qty: 42 | Refills: 0
Start: 2021-09-09 | End: 2021-09-15

## 2021-09-09 RX ORDER — INSULIN LISPRO 100/ML
2 VIAL (ML) SUBCUTANEOUS ONCE
Refills: 0 | Status: COMPLETED | OUTPATIENT
Start: 2021-09-09 | End: 2021-09-09

## 2021-09-09 RX ORDER — DEXTROSE 50 % IN WATER 50 %
15 SYRINGE (ML) INTRAVENOUS ONCE
Refills: 0 | Status: DISCONTINUED | OUTPATIENT
Start: 2021-09-09 | End: 2021-09-09

## 2021-09-09 RX ORDER — DEXTROSE 50 % IN WATER 50 %
12.5 SYRINGE (ML) INTRAVENOUS ONCE
Refills: 0 | Status: DISCONTINUED | OUTPATIENT
Start: 2021-09-09 | End: 2021-09-09

## 2021-09-09 RX ORDER — INSULIN LISPRO 100/ML
4 VIAL (ML) SUBCUTANEOUS
Refills: 0 | Status: DISCONTINUED | OUTPATIENT
Start: 2021-09-09 | End: 2021-09-09

## 2021-09-09 RX ORDER — CHOLECALCIFEROL (VITAMIN D3) 125 MCG
1000 CAPSULE ORAL DAILY
Refills: 0 | Status: DISCONTINUED | OUTPATIENT
Start: 2021-09-09 | End: 2021-09-09

## 2021-09-09 RX ORDER — INSULIN LISPRO 100/ML
VIAL (ML) SUBCUTANEOUS
Refills: 0 | Status: DISCONTINUED | OUTPATIENT
Start: 2021-09-09 | End: 2021-09-09

## 2021-09-09 RX ORDER — ASPIRIN/CALCIUM CARB/MAGNESIUM 324 MG
81 TABLET ORAL DAILY
Refills: 0 | Status: DISCONTINUED | OUTPATIENT
Start: 2021-09-09 | End: 2021-09-09

## 2021-09-09 RX ORDER — PANTOPRAZOLE SODIUM 20 MG/1
1 TABLET, DELAYED RELEASE ORAL
Qty: 8 | Refills: 0
Start: 2021-09-09 | End: 2021-09-16

## 2021-09-09 RX ORDER — ACETAMINOPHEN 500 MG
1000 TABLET ORAL ONCE
Refills: 0 | Status: COMPLETED | OUTPATIENT
Start: 2021-09-09 | End: 2021-09-09

## 2021-09-09 RX ORDER — PANTOPRAZOLE SODIUM 20 MG/1
40 TABLET, DELAYED RELEASE ORAL
Refills: 0 | Status: DISCONTINUED | OUTPATIENT
Start: 2021-09-09 | End: 2021-09-09

## 2021-09-09 RX ORDER — INSULIN GLARGINE 100 [IU]/ML
6 INJECTION, SOLUTION SUBCUTANEOUS AT BEDTIME
Refills: 0 | Status: DISCONTINUED | OUTPATIENT
Start: 2021-09-09 | End: 2021-09-09

## 2021-09-09 RX ADMIN — HYDROMORPHONE HYDROCHLORIDE 0.25 MILLIGRAM(S): 2 INJECTION INTRAMUSCULAR; INTRAVENOUS; SUBCUTANEOUS at 13:31

## 2021-09-09 RX ADMIN — Medication 1000 MILLIGRAM(S): at 14:00

## 2021-09-09 RX ADMIN — Medication 400 MILLIGRAM(S): at 13:32

## 2021-09-09 RX ADMIN — Medication 3: at 16:56

## 2021-09-09 RX ADMIN — Medication 2 UNIT(S): at 14:15

## 2021-09-09 RX ADMIN — HYDROMORPHONE HYDROCHLORIDE 0.25 MILLIGRAM(S): 2 INJECTION INTRAMUSCULAR; INTRAVENOUS; SUBCUTANEOUS at 13:30

## 2021-09-09 RX ADMIN — PANTOPRAZOLE SODIUM 40 MILLIGRAM(S): 20 TABLET, DELAYED RELEASE ORAL at 06:37

## 2021-09-09 RX ADMIN — Medication 1: at 07:33

## 2021-09-09 RX ADMIN — Medication 24 MILLIGRAM(S): at 06:37

## 2021-09-09 RX ADMIN — HYDROMORPHONE HYDROCHLORIDE 0.25 MILLIGRAM(S): 2 INJECTION INTRAMUSCULAR; INTRAVENOUS; SUBCUTANEOUS at 13:45

## 2021-09-09 RX ADMIN — Medication 4 UNIT(S): at 16:56

## 2021-09-09 NOTE — DISCHARGE NOTE NURSING/CASE MANAGEMENT/SOCIAL WORK - NSDCFUADDAPPT_GEN_ALL_CORE_FT
Please follow up with Dr. Sousa (Rheumatology) on Monday 9/13 at 8:30 AM for a DEXA scan. Address is [address] [place] [address]. Phone number is [phone].  We gave you a one week supply of your prescriptions. Please refill any necessary prescriptions at this appointment.    Please follow up with your primary care doctor within 1-2 weeks of discharge. If you do not have a primary care provider, you can call the number listed under [facility] [place] to find a PCP at the resident-run clinic. You can also call [facility] to find a primary care provider if needed.      Please also follow-up closely with the primary care provider for management of your diabetes after starting the steroids during your hospitalization.

## 2021-09-09 NOTE — PROGRESS NOTE ADULT - REASON FOR ADMISSION
Myositis

## 2021-09-09 NOTE — PRE-OP CHECKLIST - SIDE RAILS UP
Post-Op Assessment Note      CV Status:  Stable    Mental Status:  Alert and awake    Hydration Status:  Euvolemic    PONV Controlled:  Controlled    Airway Patency:  Patent    Post Op Vitals Reviewed: Yes          Staff: CRNA       Comments: vss sv nonosbrtucted uneventful          BP (P) 112/66 (10/19/18 0856)    Temp (!) (P) 97 4 °F (36 3 °C) (10/19/18 0856)    Pulse (P) 75 (10/19/18 0856)   Resp (P) 17 (10/19/18 0856)    SpO2 (P) 91 % (10/19/18 0856) done

## 2021-09-09 NOTE — PROGRESS NOTE ADULT - ATTENDING SUPERVISION STATEMENT
Resident
Resident
ACP
ACP
Fellow
Fellow
ACP
ACP
Resident/Fellow
Fellow
Resident/Fellow
Resident

## 2021-09-09 NOTE — DISCHARGE NOTE NURSING/CASE MANAGEMENT/SOCIAL WORK - PATIENT PORTAL LINK FT
You can access the FollowMyHealth Patient Portal offered by [facility] by registering at the following website: [email or web address]. By joining [facility]’s FollowMyHealth portal, you will also be able to view your health information using other applications (apps) compatible with our system.

## 2021-09-09 NOTE — PROGRESS NOTE ADULT - NUTRITIONAL ASSESSMENT
This patient has been assessed with a concern for Malnutrition and has been determined to have a diagnosis/diagnoses of Severe protein-calorie malnutrition.    This patient is being managed with:   Diet Soft-  Consistent Carbohydrate {No Snacks} (CSTCHO)  Entered: Sep  2 2021  6:04PM    
This patient has been assessed with a concern for Malnutrition and has been determined to have a diagnosis/diagnoses of Severe protein-calorie malnutrition.    This patient is being managed with:   Diet Soft-  Consistent Carbohydrate {No Snacks} (CSTCHO)  Entered: Sep  2 2021  6:04PM    
This patient has been assessed with a concern for Malnutrition and has been determined to have a diagnosis/diagnoses of Severe protein-calorie malnutrition.    This patient is being managed with:   Diet NPO after Midnight-     NPO Start Date: 06-Sep-2021   NPO Start Time: 23:59  Except Medications  With Ice Chips/Sips of Water  Entered: Sep  6 2021  8:13AM    Diet Soft-  Consistent Carbohydrate {No Snacks} (CSTCHO)  Entered: Sep  2 2021  6:04PM    
This patient has been assessed with a concern for Malnutrition and has been determined to have a diagnosis/diagnoses of Severe protein-calorie malnutrition.    This patient is being managed with:   Diet NPO after Midnight-     NPO Start Date: 06-Sep-2021   NPO Start Time: 23:59  Except Medications  With Ice Chips/Sips of Water  Entered: Sep  6 2021  8:13AM    Diet Soft-  Consistent Carbohydrate {No Snacks} (CSTCHO)  Entered: Sep  2 2021  6:04PM    
This patient has been assessed with a concern for Malnutrition and has been determined to have a diagnosis/diagnoses of Severe protein-calorie malnutrition.    This patient is being managed with:   Diet NPO after Midnight-     NPO Start Date: 08-Sep-2021   NPO Start Time: 23:59  Except Medications  Entered: Sep  8 2021  6:43PM    Diet Soft-  Consistent Carbohydrate {No Snacks} (CSTCHO)  Kosher  Supplement Feeding Modality:  Oral  Glucerna Shake Cans or Servings Per Day:  2       Frequency:  Daily  Entered: Sep  7 2021  3:57PM    
This patient has been assessed with a concern for Malnutrition and has been determined to have a diagnosis/diagnoses of Severe protein-calorie malnutrition.    This patient is being managed with:   Diet Soft-  Consistent Carbohydrate {No Snacks} (CSTCHO)  Kosher  Supplement Feeding Modality:  Oral  Glucerna Shake Cans or Servings Per Day:  2       Frequency:  Daily  Entered: Sep  7 2021  3:57PM    
This patient has been assessed with a concern for Malnutrition and has been determined to have a diagnosis/diagnoses of Severe protein-calorie malnutrition.    This patient is being managed with:   Diet Soft-  Consistent Carbohydrate {No Snacks} (CSTCHO)  Entered: Sep  2 2021  6:04PM

## 2021-09-09 NOTE — CHART NOTE - NSCHARTNOTEFT_GEN_A_CORE
Pt has an appointment on 9/13 8:30 AM at Rheumatology clinic at [facility] ([address] [facility], [phone])    Please discharge pt on Medrol PO 24mg BID, continue PCP and GI prophylaxis, vitamin D supplement    Varinder Knapp, PGY-4  Rheumatology Fellow   Pager: [phone] Pt has an appointment on 9/13 8:30 AM at Rheumatology clinic at [facility] ([address] [facility], [phone])    pending muscle bx today    Please discharge pt on Medrol PO 24mg BID, continue PCP and GI prophylaxis, vitamin D supplement    Varinder Knapp, PGY-4  Rheumatology Fellow   Pager: [phone]

## 2021-09-09 NOTE — PROGRESS NOTE ADULT - SUBJECTIVE AND OBJECTIVE BOX
**************************  Michelle Meier  PGY-1 Internal Medicine  [phone]  **************************    Patient is a 61y old  Male who presents with a chief complaint of Myositis (09 Sep 2021 03:18)      SUBJECTIVE / OVERNIGHT EVENTS:  Patient doing well this AM, reports increased strength in both upper and lower extremities. Yesterday, he walked 10 laps around the unit. No overnight events. Will go for muscle bx this AM.    MEDICATIONS  (STANDING):  aspirin enteric coated 81 milliGRAM(s) Oral daily  cholecalciferol 1000 Unit(s) Oral daily  dextrose 40% Gel 15 Gram(s) Oral once  dextrose 5% + lactated ringers 1000 milliLiter(s) (70 mL/Hr) IV Continuous <Continuous>  dextrose 5%. 1000 milliLiter(s) (100 mL/Hr) IV Continuous <Continuous>  dextrose 5%. 1000 milliLiter(s) (50 mL/Hr) IV Continuous <Continuous>  dextrose 50% Injectable 25 Gram(s) IV Push once  dextrose 50% Injectable 12.5 Gram(s) IV Push once  dextrose 50% Injectable 25 Gram(s) IV Push once  glucagon  Injectable 1 milliGRAM(s) IntraMuscular once  insulin glargine Injectable (LANTUS) 6 Unit(s) SubCutaneous at bedtime  insulin lispro (ADMELOG) corrective regimen sliding scale   SubCutaneous three times a day before meals  insulin lispro (ADMELOG) corrective regimen sliding scale   SubCutaneous at bedtime  insulin lispro Injectable (ADMELOG) 4 Unit(s) SubCutaneous three times a day before meals  lactated ringers. 1000 milliLiter(s) (75 mL/Hr) IV Continuous <Continuous>  methylPREDNISolone 24 milliGRAM(s) Oral two times a day  multivitamin 1 Tablet(s) Oral daily  pantoprazole    Tablet 40 milliGRAM(s) Oral before breakfast  thiamine 100 milliGRAM(s) Oral daily  trimethoprim  160 mG/sulfamethoxazole 800 mG 1 Tablet(s) Oral daily    MEDICATIONS  (PRN):  acetaminophen   Tablet .. 650 milliGRAM(s) Oral every 6 hours PRN Mild Pain (1 - 3), Moderate Pain (4 - 6)      CAPILLARY BLOOD GLUCOSE    POCT Blood Glucose.: 155 mg/dL (09 Sep 2021 07:27)  POCT Blood Glucose.: 195 mg/dL (08 Sep 2021 21:04)  POCT Blood Glucose.: 144 mg/dL (08 Sep 2021 16:21)  POCT Blood Glucose.: 314 mg/dL (08 Sep 2021 11:38)    I&O's Summary    08 Sep 2021 07:01  -  09 Sep 2021 07:00  --------------------------------------------------------  IN: 560 mL / OUT: 1500 mL / NET: -940 mL      PHYSICAL EXAM:  Vital Signs Last 24 Hrs  T(C): 36.2 (09 Sep 2021 04:15), Max: 36.6 (08 Sep 2021 11:13)  T(F): 97.1 (09 Sep 2021 04:15), Max: 97.9 (08 Sep 2021 11:13)  HR: 83 (09 Sep 2021 04:15) (83 - 103)  BP: 127/72 (09 Sep 2021 04:15) (107/70 - 131/78)  BP(mean): --  RR: 17 (09 Sep 2021 04:15) (17 - 18)  SpO2: 94% (09 Sep 2021 04:15) (94% - 97%)    CONSTITUTIONAL: NAD, well-developed, visibly frustrated  RESPIRATORY: Normal respiratory effort; lungs are clear to auscultation bilaterally  CARDIOVASCULAR: Regular rate and rhythm, normal S1 and S2, no murmur/rub/gallop; No lower extremity edema; Peripheral pulses are 2+ bilaterally  ABDOMEN: Nontender to palpation, normoactive bowel sounds, no rebound/guarding; No hepatosplenomegaly  MUSCLOSKELETAL: proximal > distal muscle weakness, greater strength in lower than upper extremities  PSYCH: A+O to person, place, and time; affect appropriate    LABS:                        11.7   12.81 )-----------( 337      ( 09 Sep 2021 09:01 )             36.8     09-08    134<L>  |  98  |  19  ----------------------------<  131<H>  4.0   |  26  |  0.51    Ca    9.3      08 Sep 2021 06:36  Phos  3.4     09-08  Mg     2.1     09-08    TPro  7.6  /  Alb  2.6<L>  /  TBili  0.4  /  DBili  x   /  AST  141<H>  /  ALT  198<H>  /  AlkPhos  64  09-08    PT/INR - ( 08 Sep 2021 06:36 )   PT: 11.1 sec;   INR: 0.92 ratio         PTT - ( 08 Sep 2021 06:36 )  PTT:25.9 sec  CARDIAC MARKERS ( 08 Sep 2021 06:36 )  x     / x     / 1446 U/L / x     / x

## 2021-09-09 NOTE — PROGRESS NOTE ADULT - PROBLEM SELECTOR PROBLEM 6
Colonic thickening

## 2021-09-09 NOTE — PRE-OP CHECKLIST - WARM FLUIDS/WARM BLANKETS
Physical Therapy Daily Treatment     Visit Count: 2  Plan of Care Dates: Initial: 9/20/2017 Through: 12/13/2017  Insurance Information:   T19 [facility]  No auth needed  BOMN  Recheck eligibility Oct. 1st  Next Referring Provider Visit: 3/21/18    Referred by: JUSTYN Marsh  Medical Diagnosis (from order):  Acute pain of left shoulder [M25.512]  Insurance: 1. [facility] [employer]  2. N/A    Date of onset/injury: 1-2 months   Diagnosis Precautions: none  Chart reviewed: Relevant co-morbidities, allergies, tests and medications: None     SUBJECTIVE   Patient reports pain is actually a lot better but when he gets the pain it is very painful in the side of his arm.   Current Pain: 0/10.    Functional Change: Improved pain & mobility     OBJECTIVE   No objective measures taken today.     Treatment   Therapeutic Exercise:   AAROM cane flexion  Side lying abduction x 10 - 2 lbs  Side lying ER x 10 - 2 lbs   Posterior capsule stretch   GTB rows and extension x 10 each     Manual:   Grade 3 GH mobilization inferior & posterior     Iontophoresis (04944):   Patient has been made aware of potential contraindications and possible risks associated with the use of modality and has agreed.  Location: L supraspinatus tendon    Extra Strength: 120mA-min with 8 hour average patient wear time.  Active pad: positive pole; 1.0 ml dexamethasone sodium phosphate, 4mg/ml  Inactive pad: negative pole; 1.0 ml sterile saline  Instruction: remove electrode 8 hours after treatment; monitor any skin reaction.  Results: no change in symptoms immediately following modality; no adverse reaction to treatment          Current Home Program (not performed this date except as noted above):   Pulleys flexion   AAROM cane flexion  Side lying abduction  - 2 lbs  Side lying ER - 2 lbs   Posterior capsule stretch       ASSESSMENT   Patient shows improvement in pain and tolerance to manual therapy. Patient limited with ROM at start of surgery and  showed improvement to near full range after joint mobilizations but did feel tightness. Pain reproduction to lateral arm with resisted abduction.     Pain after treatment: 0/10  Result of above outlined education: Verbalizes understanding and Demonstrates understanding    Goals:       See eval     PLAN   Continue to improve joint mobility and increase activity tolerance.      THERAPY DAILY BILLING   Primary Insurance:  [facility] COMMUNITY AND STATE  Secondary Insurance: N/A    Evaluation Procedures:  No evaluation codes were used on this date of service    Timed Procedures:   Iontophoresis, 2 minutes   Manual Therapy, 15 minutes  Therapeutic Exercise, 15 minutes    Untimed Procedures:  No untimed codes were used on this date of service    Total Treatment Time: 32 minutes    Routine safety standards followed per [facility] system and site policies      no

## 2021-09-09 NOTE — PRE-ANESTHESIA EVALUATION ADULT - NSANTHOSAYNRD_GEN_A_CORE
No. JOSE FRANCISCO screening performed.  STOP BANG Legend: 0-2 = LOW Risk; 3-4 = INTERMEDIATE Risk; 5-8 = HIGH Risk

## 2021-09-09 NOTE — PROGRESS NOTE ADULT - PROVIDER SPECIALTY LIST ADULT
Internal Medicine
Rheumatology
Vascular Cardiology
Gastroenterology
Internal Medicine
Rheumatology
Surgery
Rheumatology
Rheumatology
Surgery
Surgery
Vascular Cardiology
Internal Medicine

## 2021-09-09 NOTE — PROGRESS NOTE ADULT - PROBLEM SELECTOR PLAN 1
Patients presentation of subacute-chronic weight loss, fatigue, proximal muscle weakness, (neuromuscular) dysphagia, and elevated troponins, CK, aldolase, CRP, and transaminases favor inflammatory myopathy. Without rash dermatomyositis is less likely. W/o any joint pain. Patient was prescribed statins upon discharge from [facility] but he did not report taking them and symptoms began prior to that point.   - CT c/a/p without masses or evidence of malignancy  - confirmed by MRI of left thigh, findings consistent with polymyositis  - Rheumatology following    - s/p pulse dose solumedrol x3, solumedrol IV 60 daily, IVIG x5 days (finished 9/6)    - c/w medrol 24mg BID    - c/w Bactrim for PCP ppx, Protonix for GI ppx    - lab panels negative thus far  - muscle biopsy with general surgery today 9/9

## 2021-09-09 NOTE — PROGRESS NOTE ADULT - ATTENDING COMMENTS
Pt was cleared for Oral in take as per S&S eval, Case was discussed with the Rheum team who will start IV Steroid for the Polymyositis and recommended PCP prophylaxis and Ulcer prophylaxis .  Will monitor response             Chelo De La Cruz  Hospitalist   [phone]
No acute events reported overnight.  He continues to feel weak and fatigued.  He is frustrated by his lack of progress.  Discussed findings of the cardiac catheterization with the patient and TTE.  Patient appears dry on clinical examination would recommend continued supplemental IV fluids.  Appreciate assistance of other teams.  Cardiac MRI scheduled for next Tuesday.  The patient is an intermediate risk individual.  Prior to his hospitalizations/acute illness he was able to carry out >2METs of physical activity.  He is not having any cardiopulmonary complaints or has symptoms of heart failure.  He is scheduled for low risk procedures (colonoscopy/muscle biopsy).  The patient is at an acceptable risk to proceed.  All questions and concerns of the patient were addressed.
Cardiac MRI done  Awaiting biopsy   Appreciate rheumatology recs      Maldonado [phone]
Oropharnygeal dysphagia  NO role for PEG at this time  Please reconsult as needed
No acute events reported overnight.   The patient is feeling better.  Found patient walking around the hallways at a brisk pace with no cardiopulmonary complaints.  He is not having any signs/symptoms of heart failure at this time.  Need to closely monitor while he is getting IV steroids.  Discussed previously findings of the cardiac catheterization/non-obstructive with the patient and TTE/mid-cavitary gradient.      He is scheduled for muscle biopsy next week.  Unclear etiology of the patients myositis.  Troponin T downtrending. No need to trend at this time from a cardiovascular standpoint since likely due to polymyositis/inflammatory muscle disease (if checked cardiac Troponin I like within normal limits).     Patient appears dry on clinical examination would recommend continued supplemental IV fluids if not able to get enough in by mouth.  Since initiation of steroids reports improvement in swallowing liquids/eating.  He is being followed by speech and swallow/ENT.      Cardiac MRI scheduled for next Tuesday.      The patient is an intermediate risk individual.  Prior to his hospitalizations/acute illness he was able to carry out >2METs of physical activity.  He is not having any cardiopulmonary complaints or has symptoms of heart failure.  He is scheduled for low risk procedures (colonoscopy/muscle biopsy).  The patient is at an acceptable risk to proceed.      All questions and concerns of the patient were addressed.
No acute events reported overnight.  He continues to feel weak and fatigued but overall mildy improved compared to the prior day since he was started on steroids.  He is scheduled for muscle biopsy later this week.  Unclear etiology of the patients myositis.  Troponin downtrending.  Not having any cardiopulmonary complaints.      Discussed findings of the cardiac catheterization/non-obstructive with the patient and TTE/mid-cavitary gradient.      Patient appears dry on clinical examination would recommend continued supplemental IV fluids if not able to get enough in by mouth.  He is being followed by speech and swallow/ENT.  Concern patient is an aspiration risk.  Cardiac MRI scheduled for next Tuesday.      The patient is an intermediate risk individual.  Prior to his hospitalizations/acute illness he was able to carry out >2METs of physical activity.  He is not having any cardiopulmonary complaints or has symptoms of heart failure.  He is scheduled for low risk procedures (colonoscopy/muscle biopsy).  The patient is at an acceptable risk to proceed.      All questions and concerns of the patient were addressed.
Patient seen & examined at bedside. No acute events overnight. Clinically improving with solumedrol. Pending muscle biopsy and cardiac MRI. Discharge home afterwards. Anticipate later this week.
Plan for biopsy tomorrow   Cardiac testing normal  no cardiac objection to biopsy       Maldonado [phone]
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 Has had two of his three pulses of steroids. Strength unchanged    Plan: Pulse steroids will be completed and to have IVIg given severity of dysphagia. IgA levels normal. We will continue to follow
as above
61 year old with 4 month history of muscle weakness and dysphagia. 40 lb weight loss due to inability to eat. Has proximal muscle weakness and high CPK. No skin rash or vasculitis. No lung involvement. Serologic work-up is pending. No evidence of malignancy but work-up is incomplete at present.     History and physical findings as well as MRI consistent with inflammatory myositis as described by fellows. Is able to walk but has Grade 2-3 weakness of proximal muscles both limb girdles. Given his dysphagia and weight loss we will use pulse steroids followed by IVIg. Need to monitor diabetes carefully. GI and PCP prophylaxis. Will need DEXA and bone protection as well.
Pt was started on Steroid due to Polymyositis noted on RTLE MRI done on 9/1 with some improvement in weakness .  Pt is awaiting on Muscle biopsy to be done by Surgery.  Of note, Speech and Swallow done with MBS with reported evidence of  aspiration  with both solid and liquids and non oral nutrition was recommended which was refused by pt.  Will cont the current management for recent diagnosis of the Polymyositis and will consider repeating  the swallow studies .            Chelo Mcgheere   Hospitalist  
61 year old man with PMH DM2 was sent to hospital from vascular surgeon where he was being evaluated for LHC due to elevated troponin.  Pt was recently admitted to outside hosp with weakness, weight loss, dysphagia.  Patient reported gradual onset of dysphagia to both solid and liquid started in 4/2021 (reports getting pfizer series in 3/2021)as well as intermittent sensation of lump in his throat that resulted in choking/coughing/regurgitation. +  progressive SOB and fatigue , + 40lb weight loss, + weakness of his biceps and legs and difficulty standing from a seated position as well as reaching his arms over his head.  He was admitted several times to [facility] with extensive workup. He had labs showing elevation in CK, CRP, aldolase, troponin, CKMB, AST/ALT but negative SAMIA, C3/C4, anti-RNP, smith, centromere, scleroderma, and dsDNA (anti-Mi2 and anti-Natasha were pending). He had an echocardiogram with LVEF 70%, and no valvular disorders. He had CTA chest which showed only 6mm calcified granuloma in RLL and some calcified mediastinal LNs - ACE level and Quant Gold were pending. He had abdominal CT with thickening of colon of hepatic flexure and he was not able to get colonoscopy. He had MRI of L spine with mild L4-L5 and L5-S1 disease. He also had an outpatient EGD which was normal except for mild inflammatory changes and erythematous mucosa without evidence of malignancy or H pylori.   At [facility], patient was seen by the cardiovascular team and is  S/P LHC with non obstructive findings .]]  Rheumatology team has seen pt and a MRI of the the lower Extremities with evidence of polymyositis and was completed Methylprednisone ( 3 days), he is receiving IVIG with Hydrocortisone.  Please F/UP blood Glucose and titrate the insulin.  he is awaiting on Muscle biopsy to be done by the surgery team on 9/6/21.        Chelo De La Cruz   Hospitalist   [phone] .
Patient seen and examined, case d/w house staff.  for muscle bx today in OR.  then d/c planning on medrol with outpatient rheum follow up  total d/c time 40 minutes.
Patient seen and examined, case d/w house staff.  For cardiac MRI and muscle bx today, after which can proceed with d/c planning.  Appreciate Rheum f/u.  d/c planning  total dc time 35 minutes.
Patient seen and examined, case d/w house staff.  For muscle bx tomorrow, after which can proceed with d/c planning.  Appreciate Rheum f/u, continue medrol 24mg BID
Patient with clinical concern for polymyositis and now improving s/p pulse dose steroids and IVIG. Currently on Solumedrol 60 mg daily. He was started on PJP PPx as we expect him to be on pred 20 or greater equivalent.  He is pending muscle biopsy and cardiac MRI.    [facility] In [facility]. Willing to accept aspiration risks. Proximal muscle weakness is improving. Maintain status quo. PT recommending outpatient PT. After biopsy and when no longer on IV meds can discharge with outpatient Rheum follow up.
Patient seen and examined at bedside. No acute events overnight. Improving lower extremity weakness, though has trouble with UE and moving it past 90 degree plane. No aspiration or dysphagia. Currently on steroids for suspected polymyositis. Patient to have Cardiac MRI and muscle biopsy on 9/7.    Patient with good functional status prior to this event and able to achieve >4 METs. Biopsy likely low risk procedure. Patient medically optimized for procedure with no further work up. Active cardiac disease likely 2/2 to rheumatological process which is improving with steroids and IVIG
Very limited history as pt has been out most of the day for testing and states to be tired   as noted on chart review : 61 year old man with PMH DM2 was sent to hospital from vascular surgeon where he was being evaluated for LHC due to elevated troponin.  Pt was recently admitted to outside hosp with weakness, weight loss, dysphagia.  Patient reported gradual onset of dysphagia to both solid and liquid started in 4/2021 (reports getting pfizer series in 3/2021)as well as intermittent sensation of lump in his throat that resulted in choking/coughing/regurgitation. Around the same time he reports progressive SOB and fatigue and a ~40lb weight loss. he reports weakness of his biceps and legs and has difficulty standing from a seated position as well as reaching his arms over his head. As reported, He also has a bruise on his R bicep from when someone lightly squeezed it that has grown very large. He was admitted several times to [facility] with extensive workup. He had labs showing elevation in CK, CRP, aldolase, troponin, CKMB, AST/ALT but negative SAMIA, C3/C4, anti-RNP, smith, centromere, scleroderma, and dsDNA (anti-Mi2 and anti-Natasha were pending). He had an echocardiogram with LVEF 70%, normal RCSF, and no valvular disorders. He had CTA chest which showed only 6mm calcified granuloma in RLL and some calcified mediastinal LNs - ACE level and Quant Gold were pending. He had abdominal CT with thickening of colon of hepatic flexure (concerning for underdistension) but he was not able to get colonoscopy. He had MRI of L spine with mild L4-L5 and L5-S1 disease. He also had an outpatient EGD which was normal except for mild inflammatory changes and erythematous mucosa without evidence of malignancy or H pylori.   He is S/P LHC with non obstructive findings ( 30% Stenosis on LM).    Rheumatology team was consulted and awaiting on further work up .  DDX can be broad : DM related myositis, Autoimmune , Malignancy related .  Will give IVF tonight and await on final speech and swallow eval before initiating a oral diet / might need to have Path report of the recent EGD was reportely done .        Chelo De La Cruz   Hospitalist   [phone]

## 2021-09-09 NOTE — PROGRESS NOTE ADULT - ASSESSMENT
ASSESSMENT: Patient is a 61y old m with DM2, p/w subacute proximal muscle weakness, weight loss, dysphagia and troponemia. Concern for polymyositis and cardiomyositis. Surgery consulted for muscle biopsy.    PLAN:   - Surgery team to perform muscle biopsy in OR, left thigh per MRI read  - Booked for OR Thurs 9/9  - Cardiology and medicine clearance noted  - NPO at midnight 9/9  - Care per primary team       ASSESSMENT: Patient is a 61y old m with DM2, p/w subacute proximal muscle weakness, weight loss, dysphagia and troponemia. Concern for polymyositis and cardiomyositis. Surgery consulted for muscle biopsy.    PLAN:   - Surgery team to perform muscle biopsy in OR, left thigh per MRI read  - Booked for OR Thurs 9/9  - Cardiology and medicine clearance noted  - NPO at midnight 9/9  - Care per primary team    Vinod Dickson  PGY-2  ATP  [id number]

## 2021-09-09 NOTE — CHART NOTE - NSCHARTNOTEFT_GEN_A_CORE
Post Operative Note  Patient: KARLA LORENZ 61y (1960) Male   MRN: [id number]  Location: [address]   Visit: 08-30-21 Inpatient  Date: 09-09-21 @ 17:49    Procedure: S/P L lateral thigh muscle biopsy    Subjective:   Seen and examined 4 hrs postop. no acute events in the immediate postop period.     Objective:  Vitals: T(F): 97.2 (09-09-21 @ 14:15), Max: 97.9 (09-09-21 @ 10:28)  HR: 74 (09-09-21 @ 14:15)  BP: 109/57 (09-09-21 @ 14:15) (109/57 - 164/80)  RR: 16 (09-09-21 @ 14:15)  SpO2: 96% (09-09-21 @ 14:15)  Vent Settings:     In:   09-08-21 @ 07:01  -  09-09-21 @ 07:00  --------------------------------------------------------  IN: 560 mL      IV Fluids: cholecalciferol 1000 Unit(s) Oral daily  dextrose 5% + lactated ringers 1000 milliLiter(s) (70 mL/Hr) IV Continuous <Continuous>  dextrose 5%. 1000 milliLiter(s) (50 mL/Hr) IV Continuous <Continuous>  dextrose 5%. 1000 milliLiter(s) (100 mL/Hr) IV Continuous <Continuous>  lactated ringers. 1000 milliLiter(s) (75 mL/Hr) IV Continuous <Continuous>  multivitamin 1 Tablet(s) Oral daily  thiamine 100 milliGRAM(s) Oral daily      Out:   09-08-21 @ 07:01  -  09-09-21 @ 07:00  --------------------------------------------------------  OUT: 1500 mL      EBL: 15cc    Voided Urine:   09-08-21 @ 07:01  -  09-09-21 @ 07:00  --------------------------------------------------------  OUT: 1500 mL            Physical Examination:  General: NAD, resting comfortably in bed  HEENT: Normocephalic atraumatic  Respiratory: Nonlabored respirations, normal CW expansion.  L thigh: soft, nontender. incision c/d/i. motor and sensation in LLE intact. PT and DP intact.     Imaging:  No post-op imaging studies    Assessment:  61yMale patient S/P L lateral thigh muscle biopsy    Plan:  - Pain control PRN  - Diet: Reg  - Activity: as tolerated   - FU w/ path results    ACS  [id number]

## 2021-09-09 NOTE — PROGRESS NOTE ADULT - PROBLEM SELECTOR PLAN 9
Patient medically optimized for low risk procedure. Patient has good functional status, was functional at 4 METS before hospitalization. RCRI score 1 (6% risk).

## 2021-09-10 ENCOUNTER — NON-APPOINTMENT (OUTPATIENT)
Age: 61
End: 2021-09-10

## 2021-09-10 LAB — HMGCR ANTIBODY, IGG RESULT: 91 UNITS — HIGH (ref 0–19)

## 2021-09-13 ENCOUNTER — RESULT REVIEW (OUTPATIENT)
Age: 61
End: 2021-09-13

## 2021-09-13 ENCOUNTER — TRANSCRIPTION ENCOUNTER (OUTPATIENT)
Age: 61
End: 2021-09-13

## 2021-09-13 ENCOUNTER — APPOINTMENT (OUTPATIENT)
Dept: RHEUMATOLOGY | Facility: CLINIC | Age: 61
End: 2021-09-13

## 2021-09-13 ENCOUNTER — OUTPATIENT (OUTPATIENT)
Dept: OUTPATIENT SERVICES | Facility: HOSPITAL | Age: 61
LOS: 1 days | End: 2021-09-13

## 2021-09-13 VITALS
BODY MASS INDEX: 18.37 KG/M2 | HEART RATE: 68 BPM | HEIGHT: 69 IN | DIASTOLIC BLOOD PRESSURE: 70 MMHG | SYSTOLIC BLOOD PRESSURE: 120 MMHG | OXYGEN SATURATION: 98 % | WEIGHT: 124 LBS

## 2021-09-13 VITALS — TEMPERATURE: 98.2 F

## 2021-09-13 DIAGNOSIS — Z98.890 OTHER SPECIFIED POSTPROCEDURAL STATES: Chronic | ICD-10-CM

## 2021-09-13 DIAGNOSIS — Z86.39 PERSONAL HISTORY OF OTHER ENDOCRINE, NUTRITIONAL AND METABOLIC DISEASE: ICD-10-CM

## 2021-09-13 DIAGNOSIS — Z78.9 OTHER SPECIFIED HEALTH STATUS: ICD-10-CM

## 2021-09-13 LAB
% GAMMA, URINE: 14.8 % — SIGNIFICANT CHANGE UP
ALBUMIN 24H MFR UR ELPH: 10.2 % — SIGNIFICANT CHANGE UP
ALPHA1 GLOB 24H MFR UR ELPH: 28.7 % — SIGNIFICANT CHANGE UP
ALPHA2 GLOB 24H MFR UR ELPH: 19.7 % — SIGNIFICANT CHANGE UP
B-GLOBULIN 24H MFR UR ELPH: 26.6 % — SIGNIFICANT CHANGE UP
COLLECT DURATION TIME UR: 24 HR — SIGNIFICANT CHANGE UP
INTERPRETATION 24H UR IFE-IMP: SIGNIFICANT CHANGE UP
M PROTEIN 24H UR ELPH-MRATE: 39.6 MG/24HR — HIGH (ref 0–0)
M PROTEIN 24H UR ELPH-MRATE: 4.4 MG/DL — SIGNIFICANT CHANGE UP
PROT ?TM UR-MCNC: 87 MG/DL — HIGH (ref 0–12)
PROT PATTERN 24H UR ELPH-IMP: SIGNIFICANT CHANGE UP
PROTEIN QUANT CALC, URINE: 783 MG/24 H — HIGH (ref 50–100)
TOTAL VOLUME - 24 HOUR: 900 ML — SIGNIFICANT CHANGE UP
URINE CREATININE CALCULATION: 0.7 G/24 H — LOW (ref 1–2)

## 2021-09-13 NOTE — ASSESSMENT
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 Natasha-1 negative. Myomarker, SRP antibodies as well as muscle biopsy pending from recent discharge. Patient still with weakness in the proximal UE and LE.  \par - s/p Solumedrol 500mg IV x 3 days (9/1- 9/3), s/p Solumedrol 60mg (1mg/kg) 9/4-9/7, s/p IVIG x 5 days (9/2-9/6),d/c home on Medrol 24 mg BID\par \par - f/u myomarker panel \par - f/u CBC, CMP, CK, UA, U P/C ratio\par - c/w PO Medrol 24mg BID for now. Will plan to taper if labs from this visit demonstrate improvement\par - planning IVIG, next dose in 3 weeks\par - [facility] consult\par - evaluate for swallowing therapy if patient still with dysphagia at next visit\par \par #Diabetes Type 2: Insulin dependent\par -c/w home insulin\par -Internal medicine referral for HCM and establishing care. Consider endocrinology referral\par \par #HCM\par UTD on COVID vaccine- 2nd dose March 2021\par Will need Flu, PNA vaccines\par Needs routine sge-appropriate cancer screening. f/u medicine\par Consider DEXA at next visit\par c/w Vit D, PCP ppx, GI ppx while on high dose steroids\par \par RTC 3 weeks\par Case discussed with Dr. Sousa

## 2021-09-13 NOTE — HISTORY OF PRESENT ILLNESS
[FreeTextEntry1] : Recent discharge from [facility] 9/9/21 for subacute to chronic b/l proximal muscle weakness of UE and LE with high suspicion of inflammatory myopathy. \par \par 61y man with DM, previous short term statin use presents with subacute progressively worsening proximal muscle weakness, dysphagia and 35 lb weight loss since April 2021. Physical exam significant for proximal weakness, no skin rash. CK in 3000 s range, high suspicion for inflammatory myopathy \par HMG- CoA Reductase antibody positive (91), anti-Natasha-1 negative, myomarker panel pending\par - MRI thigh completed on 9/1- showed b/l diffuse muscle edema and enhancement s/p muscle bx on 9/9 \par - s/p Solumedrol 500 mg IV x 3 days (9/1- 9/3), s/p Solumedrol 60 mg (1mg/kg) 9/4-9/7, s/p IVIG x 5 days (9/2-9/6), dysphagia and proximal weakness improved, CK in the 1400 range \par - started on Medrol PO 24mg BID on 9/8 and discharged at this dose \par -discharged with PCP and GI prophylaxis, vitamin D supplement \par - CT c/a/p: negative for intrathoracic and intra-abd malignancy \par - had an endoscopy before, reports normal \par - never had colonoscopy before \par  [de-identified] : Patient reports some improvement in swallowing, coughs with foods but reports cough weakness. Purreed foods and thin liquids like water are ok, foods with grains are most problematic \par Everyday patient reports feeling a little better \par Left anterior pain by incision site after biopsy. Also admit to left leg swelling\par Weakness in b/l hips and shoulders improving. Able to stand from seated position but cannot do so without using arms for support. Cannot lift arms overhead/comb hair. \par Denies fevers, chills, night sweats,  vision changes, eye pain/redness, rash, joint pain/stiffness/swelling, cough, rhinorrhea, sore throat, chest pain, SOB, n/v, abdominal pain, back pain, changes in urinary freq, dysuria, hematuria, oral ulcers, Raynaud's, alopecia, DVT/PE hx, autoimmune disease in family.\par Admits to constipation, goes 2x per week, last BM yesterday. No blood or black colored stools. Admits to 3 lb weight loss since discharge from hospital. Patient reports eating better. Admits to foamy urine. \par Last time on cholesterol reducing medication about 2 years ago. \par Had COVID shot in March

## 2021-09-13 NOTE — PHYSICAL EXAM
[General Appearance - Alert] : alert [General Appearance - In No Acute Distress] : in no acute distress [General Appearance - Well Nourished] : well nourished [General Appearance - Well Developed] : well developed [Sclera] : the sclera and conjunctiva were normal [PERRL With Normal Accommodation] : pupils were equal in size, round, and reactive to light [Examination Of The Oral Cavity] : the lips and gums were normal [Oropharynx] : the oropharynx was normal [Respiration, Rhythm And Depth] : normal respiratory rhythm and effort [Auscultation Breath Sounds / Voice Sounds] : lungs were clear to auscultation bilaterally [Heart Rate And Rhythm] : heart rate was normal and rhythm regular [Heart Sounds] : normal S1 and S2 [Edema] : there was no peripheral edema [Bowel Sounds] : normal bowel sounds [Abdomen Soft] : soft [Abdomen Tenderness] : non-tender [Nail Clubbing] : no clubbing  or cyanosis of the fingernails [Musculoskeletal - Swelling] : no joint swelling seen [] : no rash [Skin Lesions] : no skin lesions [Sensation] : the sensory exam was normal to light touch and pinprick [FreeTextEntry1] : b/l shoulders 3/5 in flexion and abduction. b/l hips 2/5. Remainder of muscle strength 5/5

## 2021-09-13 NOTE — DATA REVIEWED
[FreeTextEntry1] : hospital records reviewed; CK in the 1400 range \par HMG-CoA Reductase antibody + 91, anti-Natasha-1 negative, myomarker panel pending\par CT c/a/p: negative for intrathoracic and intra-abd malignancy \par had an endoscopy before, reports normal

## 2021-09-15 ENCOUNTER — TRANSCRIPTION ENCOUNTER (OUTPATIENT)
Age: 61
End: 2021-09-15

## 2021-09-15 LAB — MUSK IGG SER IA-MCNC: <1 U/ML — SIGNIFICANT CHANGE UP

## 2021-09-16 LAB — SRP AB SERPL QL: SIGNIFICANT CHANGE UP

## 2021-09-17 DIAGNOSIS — M33.20 POLYMYOSITIS, ORGAN INVOLVEMENT UNSPECIFIED: ICD-10-CM

## 2021-09-17 DIAGNOSIS — M62.81 MUSCLE WEAKNESS (GENERALIZED): ICD-10-CM

## 2021-09-17 DIAGNOSIS — G72.49 OTHER INFLAMMATORY AND IMMUNE MYOPATHIES, NOT ELSEWHERE CLASSIFIED: ICD-10-CM

## 2021-09-24 ENCOUNTER — OUTPATIENT (OUTPATIENT)
Dept: OUTPATIENT SERVICES | Facility: HOSPITAL | Age: 61
LOS: 1 days | End: 2021-09-24

## 2021-09-24 ENCOUNTER — RESULT REVIEW (OUTPATIENT)
Age: 61
End: 2021-09-24

## 2021-09-24 ENCOUNTER — APPOINTMENT (OUTPATIENT)
Dept: INTERNAL MEDICINE | Facility: CLINIC | Age: 61
End: 2021-09-24
Payer: MEDICAID

## 2021-09-24 VITALS
OXYGEN SATURATION: 96 % | BODY MASS INDEX: 1.67 KG/M2 | HEART RATE: 92 BPM | SYSTOLIC BLOOD PRESSURE: 110 MMHG | DIASTOLIC BLOOD PRESSURE: 70 MMHG | WEIGHT: 11 LBS | HEIGHT: 68 IN

## 2021-09-24 VITALS — TEMPERATURE: 98 F

## 2021-09-24 DIAGNOSIS — R13.12 DYSPHAGIA, OROPHARYNGEAL PHASE: ICD-10-CM

## 2021-09-24 DIAGNOSIS — Z98.890 OTHER SPECIFIED POSTPROCEDURAL STATES: Chronic | ICD-10-CM

## 2021-09-24 PROCEDURE — 99203 OFFICE O/P NEW LOW 30 MIN: CPT | Mod: GE

## 2021-09-25 LAB
CREAT ?TM UR-MCNC: 73 MG/DL — SIGNIFICANT CHANGE UP
MICROALBUMIN UR-MCNC: 2.4 MG/DL — SIGNIFICANT CHANGE UP
MICROALBUMIN/CREAT UR-RTO: 33 MG/G — HIGH (ref 0–30)

## 2021-09-26 NOTE — HISTORY OF PRESENT ILLNESS
[Family Member] : family member [FreeTextEntry1] : establishment of care [de-identified] : 62 Y/O M with T2DM, recent admission to [facility] for weakness, dysphagia and weight loss with diagnosis of inflammatory myopathy (short term statin use 3-4 years ago) presents to establish care. No acute complaints. \par \par Inflammatory myopathy: admitted to [facility] after pxn with subacute progressively worsening proximal muscle weakness, dysphagia and 35 lb weight loss since 2021. Physical exam significant for proximal weakness, no skin rash. CK in 3000 s range, high suspicion for inflammatory myopathy. HMG- CoA Reductase antibody positive (91), anti-Natasha-1 negative, myomarker panel pending\par - MRI thigh completed on - showed b/l diffuse muscle edema and enhancement s/p muscle bx on  \par - s/p Solumedrol 500 mg IV x 3 days (- 9/3), s/p Solumedrol 60 mg (1mg/kg) -, s/p IVIG x 5 days (-), dysphagia and proximal weakness improved, CK in the 1400 range \par - started on Medrol PO 24mg BID on  and discharged at this dose \par -discharged with PCP and GI prophylaxis, vitamin D supplement \par - CT c/a/p: negative for intrathoracic and intra-abd malignancy \par - had an endoscopy before, reports normal \par \par Today, pt reports weakness in UE and LE improving. He is unable to lift arms above the shoulder level and requires support. He is able to walk around without difficulties; able to stand from seated position with the use of his arms. Notes that steroids are helping and was started on cellcept as well by Rheum. \par \par Dysphagia: was worked up inpatient with esophagram normal. Patient failed modified barium swallow while inpatient, appeared as oropharyngeal dysphagia. Speech&Swallow recommended alternative feeding methods, such as fluid replacement, NGT, or PEG placement. Physicians also discussed alternative options with patient. Patient refused, understood and accepted risk of aspiration. However, he preferred to continue eating soft diet. Patient reported swallowing felt stronger throughout hospitalization. \par \par During clinic visit, pt notes his swallowing has significantly improved. he is able to tolerate solid foods. Notes grainy foods irritate throat. \par \par T2DM: pt was previously on metformin and janumet and was transitioned to insulin after hospital discharge due to steroid therapy. Reported fasting FS-190's. Before dinner FS-300. a1C: 7.2 \par \par PMHx: as above\par Meds: insulin, cellcept, medrol, bactrim, pantoprazole, Vit D3\par Allergies: none\par Fam Hx: father passed away in his 50's from leukemia \par Social Hx: denies cigarette, alcohol and recreational drug use. Used to work as a computer

## 2021-09-26 NOTE — ASSESSMENT
Patient father Tessa Taylor states patient is having a episode of PTSD and can't come the the phone.  Tessa states he would like patient test result, although he's not listed to relay this information to.  Jaclynzarina states PCP understand why he should receive all information concerning patient.  Please advise: Tessa can be reached at [phone]   [FreeTextEntry1] : #HCM\par Colon cancer screening: referral today\par Deferred flu vaccine to next visit \par Tdap, Pneumo, Shingrix: pt reports he received them at prior PCP at [facility]. Will bring to next appt \par \par Discussed case with Dr. Browne\par RTC in 5 weeks for diabetes management after steroid discontinuation

## 2021-09-26 NOTE — PHYSICAL EXAM
[No Acute Distress] : no acute distress [Well Nourished] : well nourished [Well Developed] : well developed [Normal Sclera/Conjunctiva] : normal sclera/conjunctiva [EOMI] : extraocular movements intact [Normal TMs] : both tympanic membranes were normal [No JVD] : no jugular venous distention [No Lymphadenopathy] : no lymphadenopathy [Thyroid Normal, No Nodules] : the thyroid was normal and there were no nodules present [No Respiratory Distress] : no respiratory distress  [No Accessory Muscle Use] : no accessory muscle use [Clear to Auscultation] : lungs were clear to auscultation bilaterally [Normal Rate] : normal rate  [Regular Rhythm] : with a regular rhythm [Normal S1, S2] : normal S1 and S2 [Pedal Pulses Present] : the pedal pulses are present [No Edema] : there was no peripheral edema [Soft] : abdomen soft [Non Tender] : non-tender [Non-distended] : non-distended [Normal Bowel Sounds] : normal bowel sounds [Grossly Normal Strength/Tone] : grossly normal strength/tone [Coordination Grossly Intact] : coordination grossly intact [No Focal Deficits] : no focal deficits [Normal Gait] : normal gait [Speech Grossly Normal] : speech grossly normal [Normal Affect] : the affect was normal [Normal Mood] : the mood was normal [Normal Insight/Judgement] : insight and judgment were intact [Comprehensive Foot Exam Normal] : Right and left foot were examined and both feet are normal. No ulcers in either foot. Toes are normal and with full ROM.  Normal tactile sensation with monofilament testing throughout both feet [de-identified] : shoulder strength 3/5; hip strength 3/5. Distal muscle strength 5/5; sensation intact

## 2021-09-26 NOTE — REVIEW OF SYSTEMS
[Recent Change In Weight] : ~T recent weight change [Constipation] : constipation [Muscle Weakness] : muscle weakness [Fever] : no fever [Chills] : no chills [Fatigue] : no fatigue [Night Sweats] : no night sweats [Discharge] : no discharge [Pain] : no pain [Vision Problems] : no vision problems [Chest Pain] : no chest pain [Palpitations] : no palpitations [Claudication] : no  leg claudication [Lower Ext Edema] : no lower extremity edema [Orthopena] : no orthopnea [Shortness Of Breath] : no shortness of breath [Wheezing] : no wheezing [Cough] : no cough [Dyspnea on Exertion] : not dyspnea on exertion [Abdominal Pain] : no abdominal pain [Nausea] : no nausea [Vomiting] : no vomiting [Heartburn] : no heartburn [Dysuria] : no dysuria [Incontinence] : no incontinence [Hesitancy] : no hesitancy [Hematuria] : no hematuria [Back Pain] : no back pain [Joint Swelling] : no joint swelling [Itching] : no itching [Skin Rash] : no skin rash [Headache] : no headache [Dizziness] : no dizziness [Fainting] : no fainting [Memory Loss] : no memory loss [Easy Bleeding] : no easy bleeding [Easy Bruising] : no easy bruising [Swollen Glands] : no swollen glands [FreeTextEntry7] : chronic constipation

## 2021-09-27 ENCOUNTER — RESULT REVIEW (OUTPATIENT)
Age: 61
End: 2021-09-27

## 2021-09-27 ENCOUNTER — OUTPATIENT (OUTPATIENT)
Dept: OUTPATIENT SERVICES | Facility: HOSPITAL | Age: 61
LOS: 1 days | End: 2021-09-27

## 2021-09-27 ENCOUNTER — APPOINTMENT (OUTPATIENT)
Dept: RHEUMATOLOGY | Facility: CLINIC | Age: 61
End: 2021-09-27
Payer: MEDICAID

## 2021-09-27 VITALS
DIASTOLIC BLOOD PRESSURE: 70 MMHG | HEIGHT: 68 IN | SYSTOLIC BLOOD PRESSURE: 120 MMHG | OXYGEN SATURATION: 97 % | WEIGHT: 124 LBS | BODY MASS INDEX: 18.79 KG/M2 | HEART RATE: 103 BPM

## 2021-09-27 VITALS — TEMPERATURE: 97.7 F

## 2021-09-27 DIAGNOSIS — Z98.890 OTHER SPECIFIED POSTPROCEDURAL STATES: Chronic | ICD-10-CM

## 2021-09-27 PROCEDURE — 99215 OFFICE O/P EST HI 40 MIN: CPT | Mod: GC

## 2021-09-28 DIAGNOSIS — G72.89 OTHER SPECIFIED MYOPATHIES: ICD-10-CM

## 2021-09-28 DIAGNOSIS — K21.9 GASTRO-ESOPHAGEAL REFLUX DISEASE WITHOUT ESOPHAGITIS: ICD-10-CM

## 2021-09-28 DIAGNOSIS — M62.81 MUSCLE WEAKNESS (GENERALIZED): ICD-10-CM

## 2021-09-28 DIAGNOSIS — M33.20 POLYMYOSITIS, ORGAN INVOLVEMENT UNSPECIFIED: ICD-10-CM

## 2021-09-28 DIAGNOSIS — J06.9 ACUTE UPPER RESPIRATORY INFECTION, UNSPECIFIED: ICD-10-CM

## 2021-09-29 LAB
ANTI PM-SCL-100 PLUS: <20 UNITS — SIGNIFICANT CHANGE UP
ANTI-SAE 1 IGG: <20 UNITS — SIGNIFICANT CHANGE UP
ANTI-SS-A 52 KD AB, IGG PLUS: <20 UNITS — SIGNIFICANT CHANGE UP
ANTI-U1-RNP AB PLUS: <20 UNITS — SIGNIFICANT CHANGE UP
EJ MYOMARKER3 PLUS: NEGATIVE — SIGNIFICANT CHANGE UP
ENA JO1 AB SER IA-ACNC: <20 UNITS — SIGNIFICANT CHANGE UP
FIBRILLARIN (U3 RNP) PLUS: NEGATIVE — SIGNIFICANT CHANGE UP
KU MYOMARKER3 PLUS: NEGATIVE — SIGNIFICANT CHANGE UP
MDA5 (P140)(CADM-140) PLUS: <20 UNITS — SIGNIFICANT CHANGE UP
MI-2 PLUS: NEGATIVE — SIGNIFICANT CHANGE UP
NXP-2 (P140) MYOPLUS: <20 UNITS — SIGNIFICANT CHANGE UP
OJ MYOMARKER3 PLUS: NEGATIVE — SIGNIFICANT CHANGE UP
PL-12 PLUS: NEGATIVE — SIGNIFICANT CHANGE UP
PL-7 PLUS: NEGATIVE — SIGNIFICANT CHANGE UP
SRP MYOMARKER3 PLUS: NEGATIVE — SIGNIFICANT CHANGE UP
TIF GAMMA (P155/140) PLUS: <20 UNITS — SIGNIFICANT CHANGE UP
U2 SNRNP PLUS: NEGATIVE — SIGNIFICANT CHANGE UP

## 2021-09-30 DIAGNOSIS — E11.9 TYPE 2 DIABETES MELLITUS WITHOUT COMPLICATIONS: ICD-10-CM

## 2021-09-30 DIAGNOSIS — G72.49 OTHER INFLAMMATORY AND IMMUNE MYOPATHIES, NOT ELSEWHERE CLASSIFIED: ICD-10-CM

## 2021-09-30 DIAGNOSIS — R13.12 DYSPHAGIA, OROPHARYNGEAL PHASE: ICD-10-CM

## 2021-09-30 NOTE — HISTORY OF PRESENT ILLNESS
[FreeTextEntry1] : 61y man with DM, previous short term statin use presents with subacute progressively worsening proximal muscle weakness, dysphagia and 35 lb weight loss since April 2021 here for necrotizing autoimmune myopathy \par \par Recent discharge from [facility] 9/9/21 for subacute to chronic b/l proximal muscle weakness of UE and LE with high suspicion of inflammatory myopathy. Physical exam significant for proximal weakness, no skin rash. CK in 3000 s range, high suspicion for inflammatory myopathy \par HMG- CoA Reductase antibody positive (91), anti-Natasha-1 negative, myomarker panel negative \par - MRI thigh completed on 9/1- showed b/l diffuse muscle edema and enhancement s/p muscle bx on 9/9 \par - s/p Solumedrol 500 mg IV x 3 days (9/1- 9/3), s/p Solumedrol 60 mg (1mg/kg) 9/4-9/7, s/p IVIG x 5 days (9/2-9/6), dysphagia and proximal weakness improved, CK in the 1400 range \par - started on Medrol PO 24mg BID on 9/8 and discharged at this dose \par -discharged with PCP and GI prophylaxis, vitamin D supplement \par - CT c/a/p: negative for intrathoracic and intra-abd malignancy \par - had an endoscopy before, reports normal \par - never had colonoscopy before \par \par 9/13/21:  CK continue to decrease, 3000s -> 1956,  muscle strength and dysphagia improved \par on Medrol 24mg BID  \par started Cellcept 500mg BID, ordered IVIG for home infusion, has not yet approved  \par \par 9/27: muscle strength continue to improve, eats solid food with liquid for meals, no choking sensation. \par Tolerated Cellcept well, no GI side effect\par son endorsed that pt had recent URI (a couple family members were sick) last week, pt had runny nose and sore throat, cough. Still have cough today. Son felt that the URI was a setback and muscle strength today wasn't as strong as last week. \par no fevers, SOB, CP, diarrhea/constipation \par \par

## 2021-09-30 NOTE — REVIEW OF SYSTEMS
[Cough] : cough [As Noted in HPI] : as noted in HPI [Negative] : Neurological [Shortness Of Breath] : no shortness of breath [Wheezing] : no wheezing [SOB on Exertion] : no shortness of breath during exertion

## 2021-09-30 NOTE — PHYSICAL EXAM
[General Appearance - Alert] : alert [General Appearance - In No Acute Distress] : in no acute distress [Sclera] : the sclera and conjunctiva were normal [Extraocular Movements] : extraocular movements were intact [Outer Ear] : the ears and nose were normal in appearance [Hearing Threshold Finger Rub Not [place]] : hearing was normal [Neck Appearance] : the appearance of the neck was normal [Jugular Venous Distention Increased] : there was no jugular-venous distention [Heart Rate And Rhythm] : heart rate was normal and rhythm regular [Heart Sounds] : normal S1 and S2 [Murmurs] : no murmurs [Edema] : there was no peripheral edema [Abnormal Walk] : normal gait [Nail Clubbing] : no clubbing  or cyanosis of the fingernails [Musculoskeletal - Swelling] : no joint swelling seen [] : no rash [Sensation] : the sensory exam was normal to light touch and pinprick [Cranial Nerves] : cranial nerves 2-12 were intact [Oriented To Time, Place, And Person] : oriented to person, place, and time [Impaired Insight] : insight and judgment were intact [Affect] : the affect was normal [FreeTextEntry1] : no synovitis: muscle strength: neck flexion/extension 5/5, 3-/5 on proximal UE b/l, 5/5 hand  strength b/l, 2/5 on proximal LE b/l, 5/5 distal LE b/l

## 2021-09-30 NOTE — ASSESSMENT
[FreeTextEntry1] : 61y man with DM, previous short term statin use presents with subacute progressively worsening proximal muscle weakness, dysphagia and 35 lb weight loss since April 2021, + [facility] Ab  here for necrotizing autoimmune myopathy \par \par #  Immune mediated necrotizing myopathy: \par - positive HMGCR Ab, Natasha-1 negative. Myomarker panel negative \par - s/p Solumedrol 500mg IV x 3 days (9/1- 9/3), s/p Solumedrol 60mg (1mg/kg) 9/4-9/7, s/p IVIG x 5 days (9/2-9/6),d/c home on Medrol 24 mg BID\par - Cellcept started on 9/13, tolerated well, instructed pt to increase Cellcept to 500mg TID for 1 week, then 1000mg BID  \par - CK has been trending down 3000s(9/1) ->2000-> 1956 (9/13)- > 1140 (9/27)\par - muscle pathology: necrotic and degenerative muscle fibers, mild chronic inflamm changes \par - still has significant proximal thigh weakness (2/5 b/l)  \par - lower Medrol  to 20mg BID \par -c/w Vit D, PCP ppx, GI ppx while on high dose steroids\par - [facility] consult\par - pending approval for IVIG home infusion, ideally should be done this week \par \par #Diabetes Type 2: Insulin dependent\par -c/w home insulin\par -Internal medicine referral for HCM and establishing care. Consider endocrinology referral\par \par #HCM\par UTD on COVID vaccine- 2nd dose March 2021\par Will need Flu, PNA vaccines\par Needs routine sge-appropriate cancer screening. f/u medicine\par DEXA script given today \par \par \par RTC 3 weeks\par Case discussed with Dr. Sousa

## 2021-10-18 ENCOUNTER — OUTPATIENT (OUTPATIENT)
Dept: OUTPATIENT SERVICES | Facility: HOSPITAL | Age: 61
LOS: 1 days | End: 2021-10-18

## 2021-10-18 ENCOUNTER — MED ADMIN CHARGE (OUTPATIENT)
Age: 61
End: 2021-10-18

## 2021-10-18 ENCOUNTER — APPOINTMENT (OUTPATIENT)
Dept: RHEUMATOLOGY | Facility: CLINIC | Age: 61
End: 2021-10-18
Payer: MEDICAID

## 2021-10-18 VITALS
WEIGHT: 125 LBS | HEIGHT: 68 IN | SYSTOLIC BLOOD PRESSURE: 120 MMHG | DIASTOLIC BLOOD PRESSURE: 70 MMHG | BODY MASS INDEX: 18.94 KG/M2 | OXYGEN SATURATION: 99 % | HEART RATE: 83 BPM

## 2021-10-18 VITALS — TEMPERATURE: 97.4 F

## 2021-10-18 DIAGNOSIS — Z98.890 OTHER SPECIFIED POSTPROCEDURAL STATES: Chronic | ICD-10-CM

## 2021-10-18 PROCEDURE — 99214 OFFICE O/P EST MOD 30 MIN: CPT | Mod: GC

## 2021-10-21 LAB — HBA1C MFR BLD HPLC: 6.7

## 2021-10-25 NOTE — HISTORY OF PRESENT ILLNESS
[FreeTextEntry1] : 61y man with DM, previous short term statin use presents with subacute progressively worsening proximal muscle weakness, dysphagia and 35 lb weight loss since April 2021 here for necrotizing autoimmune myopathy \par \par Recent discharge from [facility] 9/9/21 for subacute to chronic b/l proximal muscle weakness of UE and LE with high suspicion of inflammatory myopathy. Physical exam significant for proximal weakness, no skin rash. CK in 3000 s range, high suspicion for inflammatory myopathy \par HMG- CoA Reductase antibody positive (91), anti-Natasha-1 negative, myomarker panel negative \par - MRI thigh completed on 9/1- showed b/l diffuse muscle edema and enhancement s/p muscle bx on 9/9 \par - s/p Solumedrol 500 mg IV x 3 days (9/1- 9/3), s/p Solumedrol 60 mg (1mg/kg) 9/4-9/7, s/p IVIG x 5 days (9/2-9/6), dysphagia and proximal weakness improved, CK in the 1400 range \par - started on Medrol PO 24mg BID on 9/8 and discharged at this dose \par -discharged with PCP and GI prophylaxis, vitamin D supplement \par - CT c/a/p: negative for intrathoracic and intra-abd malignancy \par - had an endoscopy before, reports normal \par - never had colonoscopy before \par \par 9/13/21:  CK continue to decrease, 3000s -> 1956,  muscle strength and dysphagia improved \par on Medrol 24mg BID  \par started Cellcept 500mg BID, ordered IVIG for home infusion, has not yet approved  \par \par 9/27/21: muscle strength continue to improve, eats solid food with liquid for meals, no choking sensation. \par Tolerated Cellcept well, no GI side effect\par son endorsed that pt had recent URI (a couple family members were sick) last week, pt had runny nose and sore throat, cough. Still have cough today. Son felt that the URI was a setback and muscle strength today wasn't as strong as last week. \par no fevers, SOB, CP, diarrhea/constipation \par \par 10/18/21 currently taking Cellcept 2g daily, tolerated well \par no dysphagia, good appetite, accompanied by wife, muscle strength overall improved, gait improved \par plan for first home IVIG infusion on 10/19  \par no fever, chills, SOB, CP or URI symptoms\par \par

## 2021-10-25 NOTE — PHYSICAL EXAM
[General Appearance - Alert] : alert [General Appearance - In No Acute Distress] : in no acute distress [General Appearance - Well Nourished] : well nourished [Sclera] : the sclera and conjunctiva were normal [Outer Ear] : the ears and nose were normal in appearance [Extraocular Movements] : extraocular movements were intact [Neck Appearance] : the appearance of the neck was normal [Exaggerated Use Of Accessory Muscles For Inspiration] : no accessory muscle use [Auscultation Breath Sounds / Voice Sounds] : lungs were clear to auscultation bilaterally [Heart Rate And Rhythm] : heart rate was normal and rhythm regular [Heart Sounds] : normal S1 and S2 [Edema] : there was no peripheral edema [Abnormal Walk] : normal gait [Nail Clubbing] : no clubbing  or cyanosis of the fingernails [Musculoskeletal - Swelling] : no joint swelling seen [] : no rash [No Focal Deficits] : no focal deficits [Oriented To Time, Place, And Person] : oriented to person, place, and time [FreeTextEntry1] : strength: 3/5 proximal UE b/l,  5/5 distal UE b/l, LE 2/5 on proximal LE b/l, 5/5 distal  LE b/l

## 2021-10-25 NOTE — ASSESSMENT
[FreeTextEntry1] : 61y man with DM, previous short term statin use presents with subacute progressively worsening proximal muscle weakness, dysphagia and 35 lb weight loss since April 2021, + [facility] Ab  here for necrotizing autoimmune myopathy \par \par #  Immune mediated necrotizing myopathy: \par - positive [facility]CR Ab, Natasha-1 negative. Myomarker panel negative \par - s/p Solumedrol 500mg IV x 3 days (9/1- 9/3), s/p Solumedrol 60mg (1mg/kg) 9/4-9/7, s/p IVIG x 5 days (9/2-9/6),d/c home on Medrol 24 mg BID (48mg qD0\par - Cellcept started on 9/13, currently taking 1g BID \par - CK has been trending down 3000s(9/1) ->2000-> 1956 (9/13)- > 1140 (9/27)\par - muscle pathology: necrotic and degenerative muscle fibers, mild chronic inflamm changes \par - still has significant proximal thigh weakness (2/5 b/l)  \par -c/w Vit D, PCP ppx, GI ppx while on high dose steroids\par - [facility] consult\par - instruct pt to increase Cellcept to 1.5g BID\par - decrease Medrol to 32mg qD x 2 weeks, then 24 mg x 2 weeks, 20mg x 2 weeks \par - home IVIG infusion set up for 10/19 \par - labs to be drawn with iVIG \par \par #Diabetes Type 2: Insulin dependent\par -A1c 6.7 \par - care per PCP  \par - Consider endocrinology referral\par \par #HCM\par UTD on COVID vaccine- 2nd dose March 2021\par Will need Flu, PNA vaccines\par Needs routine sge-appropriate cancer screening. f/u medicine\par DEXA script given today \par \par RTC 6 weeks\par Case discussed with Dr. Sousa

## 2021-10-27 ENCOUNTER — APPOINTMENT (OUTPATIENT)
Dept: INTERNAL MEDICINE | Facility: CLINIC | Age: 61
End: 2021-10-27

## 2021-11-01 DIAGNOSIS — G72.89 OTHER SPECIFIED MYOPATHIES: ICD-10-CM

## 2021-11-01 DIAGNOSIS — Z23 ENCOUNTER FOR IMMUNIZATION: ICD-10-CM

## 2021-11-01 DIAGNOSIS — G72.49 OTHER INFLAMMATORY AND IMMUNE MYOPATHIES, NOT ELSEWHERE CLASSIFIED: ICD-10-CM

## 2021-11-01 DIAGNOSIS — Z79.899 OTHER LONG TERM (CURRENT) DRUG THERAPY: ICD-10-CM

## 2021-11-03 ENCOUNTER — NON-APPOINTMENT (OUTPATIENT)
Age: 61
End: 2021-11-03

## 2021-11-04 ENCOUNTER — APPOINTMENT (OUTPATIENT)
Dept: INTERNAL MEDICINE | Facility: CLINIC | Age: 61
End: 2021-11-04

## 2021-11-04 ENCOUNTER — OUTPATIENT (OUTPATIENT)
Dept: OUTPATIENT SERVICES | Facility: HOSPITAL | Age: 61
LOS: 1 days | End: 2021-11-04

## 2021-11-04 VITALS — TEMPERATURE: 97.6 F

## 2021-11-04 DIAGNOSIS — Z00.00 ENCOUNTER FOR GENERAL ADULT MEDICAL EXAMINATION WITHOUT ABNORMAL FINDINGS: ICD-10-CM

## 2021-11-04 DIAGNOSIS — G72.89 OTHER SPECIFIED MYOPATHIES: ICD-10-CM

## 2021-11-04 DIAGNOSIS — G72.49 OTHER INFLAMMATORY AND IMMUNE MYOPATHIES, NOT ELSEWHERE CLASSIFIED: ICD-10-CM

## 2021-11-04 DIAGNOSIS — Z98.890 OTHER SPECIFIED POSTPROCEDURAL STATES: Chronic | ICD-10-CM

## 2021-11-04 DIAGNOSIS — Z79.899 OTHER LONG TERM (CURRENT) DRUG THERAPY: ICD-10-CM

## 2021-11-10 ENCOUNTER — APPOINTMENT (OUTPATIENT)
Dept: INTERNAL MEDICINE | Facility: CLINIC | Age: 61
End: 2021-11-10
Payer: MEDICAID

## 2021-11-10 ENCOUNTER — OUTPATIENT (OUTPATIENT)
Dept: OUTPATIENT SERVICES | Facility: HOSPITAL | Age: 61
LOS: 1 days | End: 2021-11-10

## 2021-11-10 VITALS
SYSTOLIC BLOOD PRESSURE: 114 MMHG | DIASTOLIC BLOOD PRESSURE: 74 MMHG | HEART RATE: 88 BPM | BODY MASS INDEX: 19.1 KG/M2 | WEIGHT: 126 LBS | HEIGHT: 68 IN

## 2021-11-10 DIAGNOSIS — G72.49 OTHER INFLAMMATORY AND IMMUNE MYOPATHIES, NOT ELSEWHERE CLASSIFIED: ICD-10-CM

## 2021-11-10 DIAGNOSIS — Z98.890 OTHER SPECIFIED POSTPROCEDURAL STATES: Chronic | ICD-10-CM

## 2021-11-10 DIAGNOSIS — E11.9 TYPE 2 DIABETES MELLITUS WITHOUT COMPLICATIONS: ICD-10-CM

## 2021-11-10 PROCEDURE — ZZZZZ: CPT

## 2021-11-10 NOTE — REVIEW OF SYSTEMS
[Headache] : headache [Fever] : no fever [Chills] : no chills [Fatigue] : no fatigue [Chest Pain] : no chest pain [Palpitations] : no palpitations [Shortness Of Breath] : no shortness of breath [Abdominal Pain] : no abdominal pain [Dysuria] : no dysuria [Dizziness] : no dizziness [Fainting] : no fainting [Confusion] : no confusion [Unsteady Walk] : no ataxia [de-identified] : slight headache, relieved after eating

## 2021-11-10 NOTE — HISTORY OF PRESENT ILLNESS
[Home] : at home, [unfilled] , at the time of the visit. [Medical Office: ([facility])___] : at the medical office located in  [Verbal consent obtained from patient] : the patient, [unfilled] [FreeTextEntry1] : Follow up for glucose management [de-identified] : 60 Y/O M with T2DM and inflammatory myopathy who presents for telephonic visit for glucose management. \par \par Patient notes that his blood glucose levels have been pretty well controlled. He checks levels in the AM and reports they have primarily been in the 120s-140s. Patient denies any symptoms of hypoglycemia. No feelings of sweating, dizziness, excessive hunger, fatigue. Patient has been taking 6-8U of levemir. He is still on steroid taper and taking 3 medrol tablets/day this week, with plans to taper every 2 weeks, reducing to 2.5 tabs/day and then to 2 tabs/day. Patient to follow up with rheumatology 12/6. Patient to be seen in our office 1/6/22.

## 2021-11-10 NOTE — PLAN
[FreeTextEntry1] : \par 62 Y/O M with T2DM and inflammatory myopathy who presents for telephonic visit for glucose management. Glucose is relatively well controlled with no hypoglycemic events.\par \par Patient to be seen in clinic 1/6/22.\par \par Case discussed with Dr. Chato Land. \par \par David Pastor MD\par PGY1\par [employer]\par

## 2021-12-06 ENCOUNTER — APPOINTMENT (OUTPATIENT)
Dept: RHEUMATOLOGY | Facility: CLINIC | Age: 61
End: 2021-12-06
Payer: MEDICAID

## 2021-12-06 ENCOUNTER — OUTPATIENT (OUTPATIENT)
Dept: OUTPATIENT SERVICES | Facility: HOSPITAL | Age: 61
LOS: 1 days | End: 2021-12-06

## 2021-12-06 VITALS
SYSTOLIC BLOOD PRESSURE: 121 MMHG | TEMPERATURE: 97.7 F | HEART RATE: 96 BPM | DIASTOLIC BLOOD PRESSURE: 78 MMHG | BODY MASS INDEX: 20.25 KG/M2 | HEIGHT: 68 IN | OXYGEN SATURATION: 99 % | WEIGHT: 133.6 LBS

## 2021-12-06 DIAGNOSIS — M33.20 POLYMYOSITIS, ORGAN INVOLVEMENT UNSPECIFIED: ICD-10-CM

## 2021-12-06 DIAGNOSIS — Z98.890 OTHER SPECIFIED POSTPROCEDURAL STATES: Chronic | ICD-10-CM

## 2021-12-06 DIAGNOSIS — M62.81 MUSCLE WEAKNESS (GENERALIZED): ICD-10-CM

## 2021-12-06 DIAGNOSIS — Z79.899 OTHER LONG TERM (CURRENT) DRUG THERAPY: ICD-10-CM

## 2021-12-06 PROCEDURE — 99214 OFFICE O/P EST MOD 30 MIN: CPT | Mod: GC

## 2021-12-08 ENCOUNTER — APPOINTMENT (OUTPATIENT)
Dept: INTERNAL MEDICINE | Facility: CLINIC | Age: 61
End: 2021-12-08

## 2021-12-08 ENCOUNTER — RESULT REVIEW (OUTPATIENT)
Age: 61
End: 2021-12-08

## 2021-12-08 ENCOUNTER — NON-APPOINTMENT (OUTPATIENT)
Age: 61
End: 2021-12-08

## 2021-12-08 ENCOUNTER — OUTPATIENT (OUTPATIENT)
Dept: OUTPATIENT SERVICES | Facility: HOSPITAL | Age: 61
LOS: 1 days | End: 2021-12-08

## 2021-12-08 VITALS — TEMPERATURE: 97.3 F

## 2021-12-08 DIAGNOSIS — G72.89 OTHER SPECIFIED MYOPATHIES: ICD-10-CM

## 2021-12-08 DIAGNOSIS — Z79.899 OTHER LONG TERM (CURRENT) DRUG THERAPY: ICD-10-CM

## 2021-12-08 DIAGNOSIS — M62.81 MUSCLE WEAKNESS (GENERALIZED): ICD-10-CM

## 2021-12-08 DIAGNOSIS — Z98.890 OTHER SPECIFIED POSTPROCEDURAL STATES: Chronic | ICD-10-CM

## 2021-12-08 DIAGNOSIS — M33.20 POLYMYOSITIS, ORGAN INVOLVEMENT UNSPECIFIED: ICD-10-CM

## 2021-12-08 NOTE — PHYSICAL EXAM
[General Appearance - Alert] : alert [General Appearance - In No Acute Distress] : in no acute distress [General Appearance - Well Nourished] : well nourished [Sclera] : the sclera and conjunctiva were normal [Extraocular Movements] : extraocular movements were intact [Outer Ear] : the ears and nose were normal in appearance [Neck Appearance] : the appearance of the neck was normal [Exaggerated Use Of Accessory Muscles For Inspiration] : no accessory muscle use [Auscultation Breath Sounds / Voice Sounds] : lungs were clear to auscultation bilaterally [Heart Rate And Rhythm] : heart rate was normal and rhythm regular [Heart Sounds] : normal S1 and S2 [Edema] : there was no peripheral edema [Abnormal Walk] : normal gait [Nail Clubbing] : no clubbing  or cyanosis of the fingernails [Musculoskeletal - Swelling] : no joint swelling seen [] : no rash [No Focal Deficits] : no focal deficits [Oriented To Time, Place, And Person] : oriented to person, place, and time [FreeTextEntry1] : strength: 4/5 proximal UE b/l,  5/5 distal UE b/l, LE 2+/5 on proximal LE b/l, 5/5 distal  LE b/l

## 2021-12-08 NOTE — ASSESSMENT
[FreeTextEntry1] : 61y man with DM, previous short term statin use presents with subacute progressively worsening proximal muscle weakness, dysphagia and 35 lb weight loss since April 2021, + [facility] Ab  here for necrotizing autoimmune myopathy \par \par #  Immune mediated necrotizing myopathy: \par - positive [facility]CR Ab, Natasha-1 negative. Myomarker panel negative \par - s/p Solumedrol 500mg IV x 3 days (9/1- 9/3), s/p Solumedrol 60mg (1mg/kg) 9/4-9/7, s/p IVIG x 5 days (9/2-9/6),d/c home on Medrol 24 mg BID (48mg qD)\par - CK has been trending down 3000s(9/1) ->2000-> 1956 (9/13)- > 1140 (9/27) -> 1189 (11/4)  \par - muscle pathology: necrotic and degenerative muscle fibers, mild chronic inflamm changes \par - still has significant proximal thigh weakness (2+/5 b/l)  \par -c/w Vit D, PCP ppx, GI ppx while on high dose steroids\par - Cellcept started on 9/13, currently taking 2g daily, instruct pt to increase Cellcept to 3g daily  \par - on Medrol 16 mg daily, decrease to Medrol 12mg daily x 2 weeks, then 8 mg daily x  2 weeks  \par - continue home IVIG (started on 10/19 - ) \par \par # Diabetes Type 2: Insulin dependent\par -A1c 6.7 \par - care per PCP  \par - Consider endocrinology referral\par \par #HCM\par UTD on COVID vaccine- 2nd dose March 2021\par Will need Flu, PNA vaccines\par Needs routine age -appropriate cancer screening. f/u medicine\par DEXA script given today \par \par RTC 6 weeks\par Case discussed with Dr. Sousa

## 2021-12-08 NOTE — HISTORY OF PRESENT ILLNESS
[___ Day(s) Ago] : [unfilled] day(s) ago [de-identified] : 10/18/21  [FreeTextEntry1] : muscle strength continue to improve, no dysphagia, gait improved  \par feels appetite is better, endorses 10 lb weight gain\par still taking Cellcept 2g daily, Medrol lowered to 16mg daily\par getting home IVIG infusion q2 weeks, split into 2 days, last infusion 12/1 and 12/2 \par no fever, chills, SOB, CP or URI symptoms

## 2021-12-09 ENCOUNTER — NON-APPOINTMENT (OUTPATIENT)
Age: 61
End: 2021-12-09

## 2021-12-30 ENCOUNTER — APPOINTMENT (OUTPATIENT)
Dept: GASTROENTEROLOGY | Facility: CLINIC | Age: 61
End: 2021-12-30
Payer: MEDICAID

## 2021-12-30 ENCOUNTER — OUTPATIENT (OUTPATIENT)
Dept: OUTPATIENT SERVICES | Facility: HOSPITAL | Age: 61
LOS: 1 days | End: 2021-12-30

## 2021-12-30 VITALS
HEART RATE: 116 BPM | TEMPERATURE: 97.2 F | DIASTOLIC BLOOD PRESSURE: 70 MMHG | SYSTOLIC BLOOD PRESSURE: 130 MMHG | HEIGHT: 68 IN | BODY MASS INDEX: 20.95 KG/M2 | OXYGEN SATURATION: 98 % | WEIGHT: 138.25 LBS

## 2021-12-30 DIAGNOSIS — Z12.11 ENCOUNTER FOR SCREENING FOR MALIGNANT NEOPLASM OF COLON: ICD-10-CM

## 2021-12-30 DIAGNOSIS — D64.9 ANEMIA, UNSPECIFIED: ICD-10-CM

## 2021-12-30 DIAGNOSIS — R13.10 DYSPHAGIA, UNSPECIFIED: ICD-10-CM

## 2021-12-30 DIAGNOSIS — Z98.890 OTHER SPECIFIED POSTPROCEDURAL STATES: Chronic | ICD-10-CM

## 2021-12-30 DIAGNOSIS — E11.9 TYPE 2 DIABETES MELLITUS WITHOUT COMPLICATIONS: ICD-10-CM

## 2021-12-30 DIAGNOSIS — G72.49 OTHER INFLAMMATORY AND IMMUNE MYOPATHIES, NOT ELSEWHERE CLASSIFIED: ICD-10-CM

## 2021-12-30 PROCEDURE — 99214 OFFICE O/P EST MOD 30 MIN: CPT | Mod: GC

## 2021-12-30 PROCEDURE — 99204 OFFICE O/P NEW MOD 45 MIN: CPT | Mod: GC

## 2021-12-30 NOTE — ASSESSMENT
[FreeTextEntry1] : Impression:\par # Dysphagia in the setting of myopathy: resolved now with treatment. However never had prior EGD with bx as procedure was aborted because he became tachycardic during case. DDx likely 2/2 myopathy however patient also on PPI for steroid ppx so unclear if that has caused resolution of symptoms if it was 2/2 EoE, lymphocytic esophagitis or other primary GI etiology.\par # Screening colonoscopy: none prior. Average risk\par # Normocytic Anemia: unclear etiology. Mild but no prior GI workup. No hx of colonoscopy. Reports endoscopy few months prior but no record and patients reports no bx taken as procedure aborted due to tachycardia. Possible occult GI blood loss from mass, polyp, AVM, ulcer. Also AOCD on DDx.\par # Necrotizing myopathy: on steroids and cellcept.\par # T2DM\par \par Plan:\par - schedule EGD and colonoscopy for dysphagia and screening; patient also anemic but no iron studies performed so unclear if this is iron deficiency\par - will get presurgical testing as patient had prior EGD as outpatient and it was aborted due to tachycardia \par - bowel prep ordered\par - liquid diet day prior and NPO at midnight day of exam\par - PPI ppx while on steroids\par - check iron studies\par - Benefits and risks, including pain, infection, bleeding, missed lesions, perforation, risk of anesthesia and death were explained to the patient. Patient agreed to proceed. \par - RTC after procedure

## 2021-12-30 NOTE — HISTORY OF PRESENT ILLNESS
[Heartburn] : denies heartburn [Nausea] : denies nausea [Vomiting] : denies vomiting [Diarrhea] : denies diarrhea [Constipation] : denies constipation [Yellow Skin Or Eyes (Jaundice)] : denies jaundice [Abdominal Pain] : denies abdominal pain [Abdominal Swelling] : denies abdominal swelling [Rectal Pain] : denies rectal pain [Wt Gain ___ Lbs] : no recent weight gain [Wt Loss ___ Lbs] : no recent weight loss [de-identified] : n/a [de-identified] : P is a 62yo with PMH necrotizing immune mediated myopathy on cellcept and steroids, T2DM presenting for colon cancer screening. Patient noted to be mildly anemic on lab work. Patient had a hx of dysphagia when recently dx with the myopathy. Also had weight loss. Patient has been improving with current treatment. Patient is on ppi ppx while continuing with current steroids. Denies any other symptoms of melena, hematochezia, abdominal pain, po intolerance, nausea or vomiting.\par \par No prior endoscopy or colonoscopy.\par \par Family history negative for peptic ulcer disease, gastric cancer, colon cancer, colon polyps, liver disease, pancreatic disease, breast cancer, ovarian cancer, uterine cancer, or brain cancer.

## 2021-12-30 NOTE — END OF VISIT
[] : Fellow [FreeTextEntry3] : 61F with immune-mediated myopathy on chronic steroids and cellcept, DM referred for CRC screening. Mild anemia with Hgb 12.3 on recent labs, no iron studies available. Also with reports of dysphagia (improved with treatment of myopathy) and weight loss. Outpatient EGD/colonoscopy had been previously attempted, but was aborted due to ?tachycardia and ultimately colonoscopy was not attempted and EGD was incomplete. Will arrange for colonoscopy for screening and EGD for recent dysphagia/weight loss (though improving). Will also recommend iron studies given mild anemia. PST prior to procedure given recent ?anesthetic complications.

## 2021-12-30 NOTE — PHYSICAL EXAM
[General Appearance - Alert] : alert [General Appearance - In No Acute Distress] : in no acute distress [Sclera] : the sclera and conjunctiva were normal [Outer Ear] : the ears and nose were normal in appearance [Neck Appearance] : the appearance of the neck was normal [Heart Rate And Rhythm] : heart rate was normal and rhythm regular [Bowel Sounds] : normal bowel sounds [Abdomen Soft] : soft [Abdomen Tenderness] : non-tender [] : no rash [No Focal Deficits] : no focal deficits [Oriented To Time, Place, And Person] : oriented to person, place, and time

## 2022-01-05 ENCOUNTER — APPOINTMENT (OUTPATIENT)
Dept: INTERNAL MEDICINE | Facility: CLINIC | Age: 62
End: 2022-01-05

## 2022-01-05 ENCOUNTER — OUTPATIENT (OUTPATIENT)
Dept: OUTPATIENT SERVICES | Facility: HOSPITAL | Age: 62
LOS: 1 days | End: 2022-01-05

## 2022-01-05 ENCOUNTER — RESULT REVIEW (OUTPATIENT)
Age: 62
End: 2022-01-05

## 2022-01-05 DIAGNOSIS — G72.49 OTHER INFLAMMATORY AND IMMUNE MYOPATHIES, NOT ELSEWHERE CLASSIFIED: ICD-10-CM

## 2022-01-05 DIAGNOSIS — Z12.11 ENCOUNTER FOR SCREENING FOR MALIGNANT NEOPLASM OF COLON: ICD-10-CM

## 2022-01-05 DIAGNOSIS — D64.9 ANEMIA, UNSPECIFIED: ICD-10-CM

## 2022-01-05 DIAGNOSIS — R13.10 DYSPHAGIA, UNSPECIFIED: ICD-10-CM

## 2022-01-05 DIAGNOSIS — Z98.890 OTHER SPECIFIED POSTPROCEDURAL STATES: Chronic | ICD-10-CM

## 2022-01-05 DIAGNOSIS — E11.9 TYPE 2 DIABETES MELLITUS WITHOUT COMPLICATIONS: ICD-10-CM

## 2022-01-05 LAB
FERRITIN SERPL-MCNC: 658 NG/ML — HIGH (ref 30–400)
IRON SATN MFR SERPL: 35 % — SIGNIFICANT CHANGE UP (ref 14–50)
IRON SATN MFR SERPL: 94 UG/DL — SIGNIFICANT CHANGE UP (ref 45–165)
TIBC SERPL-MCNC: 268 UG/DL — SIGNIFICANT CHANGE UP (ref 220–430)
UIBC SERPL-MCNC: 174 UG/DL — SIGNIFICANT CHANGE UP (ref 110–370)

## 2022-01-06 ENCOUNTER — OUTPATIENT (OUTPATIENT)
Dept: OUTPATIENT SERVICES | Facility: HOSPITAL | Age: 62
LOS: 1 days | End: 2022-01-06

## 2022-01-06 ENCOUNTER — APPOINTMENT (OUTPATIENT)
Dept: INTERNAL MEDICINE | Facility: CLINIC | Age: 62
End: 2022-01-06
Payer: MEDICAID

## 2022-01-06 VITALS
DIASTOLIC BLOOD PRESSURE: 62 MMHG | HEIGHT: 68 IN | WEIGHT: 138 LBS | OXYGEN SATURATION: 98 % | SYSTOLIC BLOOD PRESSURE: 110 MMHG | RESPIRATION RATE: 16 BRPM | HEART RATE: 98 BPM | BODY MASS INDEX: 20.92 KG/M2

## 2022-01-06 DIAGNOSIS — Z98.890 OTHER SPECIFIED POSTPROCEDURAL STATES: Chronic | ICD-10-CM

## 2022-01-06 PROCEDURE — 99213 OFFICE O/P EST LOW 20 MIN: CPT | Mod: GE

## 2022-01-07 ENCOUNTER — NON-APPOINTMENT (OUTPATIENT)
Age: 62
End: 2022-01-07

## 2022-01-07 NOTE — PHYSICAL EXAM
[No Acute Distress] : no acute distress [Well Nourished] : well nourished [Well Developed] : well developed [No Respiratory Distress] : no respiratory distress  [No Accessory Muscle Use] : no accessory muscle use [Clear to Auscultation] : lungs were clear to auscultation bilaterally [Normal Rate] : normal rate  [Regular Rhythm] : with a regular rhythm [Normal S1, S2] : normal S1 and S2 [No Murmur] : no murmur heard [Soft] : abdomen soft [Non Tender] : non-tender [Non-distended] : non-distended [Normal Bowel Sounds] : normal bowel sounds [No Joint Swelling] : no joint swelling [Normal Affect] : the affect was normal [Normal Insight/Judgement] : insight and judgment were intact [Comprehensive Foot Exam Normal] : Right and left foot were examined and both feet are normal. No ulcers in either foot. Toes are normal and with full ROM.  Normal tactile sensation with monofilament testing throughout both feet

## 2022-01-18 DIAGNOSIS — Z00.00 ENCOUNTER FOR GENERAL ADULT MEDICAL EXAMINATION WITHOUT ABNORMAL FINDINGS: ICD-10-CM

## 2022-01-18 DIAGNOSIS — G72.49 OTHER INFLAMMATORY AND IMMUNE MYOPATHIES, NOT ELSEWHERE CLASSIFIED: ICD-10-CM

## 2022-01-18 DIAGNOSIS — E11.9 TYPE 2 DIABETES MELLITUS WITHOUT COMPLICATIONS: ICD-10-CM

## 2022-01-18 NOTE — ASSESSMENT
[FreeTextEntry1] : #HCM\par Colon cancer screening: Appoinment in [facility]\par Flu vaccine UTD\par Covid Vaccine x2. Wants to talk to rheum about booster  \par Tdap, Pneumo, Shingrix: pt reports he received them at prior PCP at [facility]. Will bring to next appt \par \par Discussed case with Dr. Baez\par RTC in 3 months for diabetes management

## 2022-01-18 NOTE — HISTORY OF PRESENT ILLNESS
[FreeTextEntry1] : follow yo [de-identified] : 62 Y/O M with T2DM and inflammatory myopathy who presents for follow up for glucose management. Pt states that his morning glucose is between 120-170. Denies any symptoms of hypoglycemia. He is seeing rheumatology for his inflammatory myopathy and is currently on cellcept 3g and solumedrol taper. Pt also recently started on IVIG. No feelings of sweating, dizziness, excessive hunger, fatigue. Patient has been taking 6-8U of levemir in the am

## 2022-01-18 NOTE — REVIEW OF SYSTEMS
[Fever] : no fever [Chills] : no chills [Fatigue] : no fatigue [Chest Pain] : no chest pain [Palpitations] : no palpitations [Claudication] : no  leg claudication [Lower Ext Edema] : no lower extremity edema [Shortness Of Breath] : no shortness of breath [Wheezing] : no wheezing [Cough] : no cough [Dyspnea on Exertion] : not dyspnea on exertion [Abdominal Pain] : no abdominal pain [Nausea] : no nausea [Vomiting] : no vomiting [Muscle Pain] : no muscle pain

## 2022-01-24 ENCOUNTER — APPOINTMENT (OUTPATIENT)
Dept: RHEUMATOLOGY | Facility: CLINIC | Age: 62
End: 2022-01-24
Payer: MEDICAID

## 2022-01-24 ENCOUNTER — OUTPATIENT (OUTPATIENT)
Dept: OUTPATIENT SERVICES | Facility: HOSPITAL | Age: 62
LOS: 1 days | End: 2022-01-24

## 2022-01-24 ENCOUNTER — RESULT REVIEW (OUTPATIENT)
Age: 62
End: 2022-01-24

## 2022-01-24 ENCOUNTER — NON-APPOINTMENT (OUTPATIENT)
Age: 62
End: 2022-01-24

## 2022-01-24 VITALS
HEART RATE: 78 BPM | OXYGEN SATURATION: 98 % | WEIGHT: 143 LBS | HEIGHT: 68 IN | SYSTOLIC BLOOD PRESSURE: 120 MMHG | DIASTOLIC BLOOD PRESSURE: 60 MMHG | BODY MASS INDEX: 21.67 KG/M2

## 2022-01-24 VITALS — TEMPERATURE: 93.7 F

## 2022-01-24 DIAGNOSIS — Z98.890 OTHER SPECIFIED POSTPROCEDURAL STATES: Chronic | ICD-10-CM

## 2022-01-24 PROCEDURE — 99214 OFFICE O/P EST MOD 30 MIN: CPT | Mod: GC

## 2022-01-24 RX ORDER — BLOOD SUGAR DIAGNOSTIC
STRIP MISCELLANEOUS 4 TIMES DAILY
Qty: 100 | Refills: 0 | Status: ACTIVE | COMMUNITY
Start: 2022-01-24 | End: 1900-01-01

## 2022-01-24 RX ORDER — BLOOD-GLUCOSE METER
W/DEVICE KIT MISCELLANEOUS
Qty: 1 | Refills: 0 | Status: ACTIVE | COMMUNITY
Start: 2022-01-24 | End: 1900-01-01

## 2022-01-26 DIAGNOSIS — G72.49 OTHER INFLAMMATORY AND IMMUNE MYOPATHIES, NOT ELSEWHERE CLASSIFIED: ICD-10-CM

## 2022-01-26 DIAGNOSIS — M62.81 MUSCLE WEAKNESS (GENERALIZED): ICD-10-CM

## 2022-01-26 DIAGNOSIS — Z79.899 OTHER LONG TERM (CURRENT) DRUG THERAPY: ICD-10-CM

## 2022-01-26 NOTE — PHYSICAL EXAM
[General Appearance - Alert] : alert [General Appearance - In No Acute Distress] : in no acute distress [General Appearance - Well Nourished] : well nourished [Sclera] : the sclera and conjunctiva were normal [Extraocular Movements] : extraocular movements were intact [Outer Ear] : the ears and nose were normal in appearance [Neck Appearance] : the appearance of the neck was normal [Exaggerated Use Of Accessory Muscles For Inspiration] : no accessory muscle use [Auscultation Breath Sounds / Voice Sounds] : lungs were clear to auscultation bilaterally [Heart Rate And Rhythm] : heart rate was normal and rhythm regular [Heart Sounds] : normal S1 and S2 [Edema] : there was no peripheral edema [Abnormal Walk] : normal gait [Nail Clubbing] : no clubbing  or cyanosis of the fingernails [Musculoskeletal - Swelling] : no joint swelling seen [] : no rash [No Focal Deficits] : no focal deficits [Oriented To Time, Place, And Person] : oriented to person, place, and time [FreeTextEntry1] : strength: 10/10 proximal UE b/l,  10/10 distal UE b/l, LE 4/10 on proximal LE b/l, 10/10 distal  LE b/l

## 2022-01-26 NOTE — ASSESSMENT
[FreeTextEntry1] : 61y man with DM, previous short term statin use presents with subacute progressively worsening proximal muscle weakness, dysphagia and 35 lb weight loss since April 2021, + [facility] Ab  here for necrotizing autoimmune myopathy \par \par #  Immune mediated necrotizing myopathy: \par - positive [facility] Ab, Natasha-1 negative. Myomarker panel negative \par - s/p Solumedrol 500 mg IV x 3 days (9/1- 9/3), s/p Solumedrol 60 mg (1mg/kg) 9/4-9/7, s/p IVIG x 5 days (9/2-9/6),d/c home on Medrol 24 mg BID \par - CK has been trending down 3000s(9/1) ->2000-> 1956 (9/13)- > 1140 (9/27) -> 1189 (11/2021) -> 815 (12/2021)\par - muscle pathology: necrotic and degenerative muscle fibers, mild chronic inflammatory changes \par - LE muscle strength continue to improve 4/10 (gradual release from testing position)\par -c/w Vit D, PCP ppx, GI pp \par - on Cellcept to 3g daily (max dose), tolerated well \par - on Medrol 8 mg daily, will decrease to 6 mg daily for 3 weeks, then 4mg daily  until next appointment \par - continue home IVIG (started on 10/19 - ) \par \par # Diabetes Type 2: Insulin dependent\par - A1c 6.7 \par - care per PCP  \par - Consider endocrinology referral\par \par # COVID vaccine\par - UTD on COVID vaccine- 2nd dose March 2021\par - had high positive Calvin antibody in Sept 2021\par - pt hesitant to get booster shot - developed muscle weakness 2 weeks after 2nd shot \par - could give pt monoclonal antibody if he is exposed, instructed pt to call clinic if he tested positive or exposed. \par \par #HCM\par - Flu, PNA vaccines- UTD per pt \par - Needs routine age -appropriate cancer screening. f/u medicine\par - DEXA script given today - still has not complete yet \par \par RTC 6 weeks\par Case discussed with Dr. Sousa

## 2022-01-26 NOTE — HISTORY OF PRESENT ILLNESS
[___ Day(s) Ago] : [unfilled] day(s) ago [de-identified] : 12/6/21  [FreeTextEntry1] : current meds: Cellcept 3g daily,  Medrol 8mg qD, IVIG q 2 weeks, Bactrim for PCP prophylaxis \par muscle strength continues to improve, not using his arms as much when getting up from bed\par denies dysphagia, rashes, joint pain/joint swelling, fever, chills, SOB, CP  \par appetite continues to improve,  has about 5 lb weight gain since last visit \par asked about  whether he should take the covid booster shot - he felt muscle weakness developed 2 weeks after the 2nd covid vaccine

## 2022-02-18 ENCOUNTER — OUTPATIENT (OUTPATIENT)
Dept: OUTPATIENT SERVICES | Facility: HOSPITAL | Age: 62
LOS: 1 days | End: 2022-02-18
Payer: MEDICAID

## 2022-02-18 VITALS
OXYGEN SATURATION: 98 % | RESPIRATION RATE: 16 BRPM | TEMPERATURE: 98 F | SYSTOLIC BLOOD PRESSURE: 136 MMHG | HEIGHT: 67 IN | HEART RATE: 86 BPM | DIASTOLIC BLOOD PRESSURE: 86 MMHG

## 2022-02-18 DIAGNOSIS — Z98.890 OTHER SPECIFIED POSTPROCEDURAL STATES: Chronic | ICD-10-CM

## 2022-02-18 DIAGNOSIS — R13.10 DYSPHAGIA, UNSPECIFIED: ICD-10-CM

## 2022-02-18 DIAGNOSIS — Z87.39 PERSONAL HISTORY OF OTHER DISEASES OF THE MUSCULOSKELETAL SYSTEM AND CONNECTIVE TISSUE: ICD-10-CM

## 2022-02-18 DIAGNOSIS — E11.9 TYPE 2 DIABETES MELLITUS WITHOUT COMPLICATIONS: ICD-10-CM

## 2022-02-18 LAB
A1C WITH ESTIMATED AVERAGE GLUCOSE RESULT: 8 % — HIGH (ref 4–5.6)
ALBUMIN SERPL ELPH-MCNC: 3.8 G/DL — SIGNIFICANT CHANGE UP (ref 3.3–5)
ALP SERPL-CCNC: 79 U/L — SIGNIFICANT CHANGE UP (ref 40–120)
ALT FLD-CCNC: 59 U/L — HIGH (ref 4–41)
ANION GAP SERPL CALC-SCNC: 11 MMOL/L — SIGNIFICANT CHANGE UP (ref 7–14)
AST SERPL-CCNC: 59 U/L — HIGH (ref 4–40)
BILIRUB SERPL-MCNC: <0.2 MG/DL — SIGNIFICANT CHANGE UP (ref 0.2–1.2)
BUN SERPL-MCNC: 17 MG/DL — SIGNIFICANT CHANGE UP (ref 7–23)
CALCIUM SERPL-MCNC: 9.1 MG/DL — SIGNIFICANT CHANGE UP (ref 8.4–10.5)
CHLORIDE SERPL-SCNC: 101 MMOL/L — SIGNIFICANT CHANGE UP (ref 98–107)
CO2 SERPL-SCNC: 24 MMOL/L — SIGNIFICANT CHANGE UP (ref 22–31)
CREAT SERPL-MCNC: 0.53 MG/DL — SIGNIFICANT CHANGE UP (ref 0.5–1.3)
ESTIMATED AVERAGE GLUCOSE: 183 — SIGNIFICANT CHANGE UP
GLUCOSE SERPL-MCNC: 199 MG/DL — HIGH (ref 70–99)
HCT VFR BLD CALC: 37.8 % — LOW (ref 39–50)
HGB BLD-MCNC: 11.6 G/DL — LOW (ref 13–17)
MCHC RBC-ENTMCNC: 29 PG — SIGNIFICANT CHANGE UP (ref 27–34)
MCHC RBC-ENTMCNC: 30.7 GM/DL — LOW (ref 32–36)
MCV RBC AUTO: 94.5 FL — SIGNIFICANT CHANGE UP (ref 80–100)
NRBC # BLD: 0 /100 WBCS — SIGNIFICANT CHANGE UP
NRBC # FLD: 0 K/UL — SIGNIFICANT CHANGE UP
PLATELET # BLD AUTO: 192 K/UL — SIGNIFICANT CHANGE UP (ref 150–400)
POTASSIUM SERPL-MCNC: 4 MMOL/L — SIGNIFICANT CHANGE UP (ref 3.5–5.3)
POTASSIUM SERPL-SCNC: 4 MMOL/L — SIGNIFICANT CHANGE UP (ref 3.5–5.3)
PROT SERPL-MCNC: 7.8 G/DL — SIGNIFICANT CHANGE UP (ref 6–8.3)
RBC # BLD: 4 M/UL — LOW (ref 4.2–5.8)
RBC # FLD: 13.3 % — SIGNIFICANT CHANGE UP (ref 10.3–14.5)
SODIUM SERPL-SCNC: 136 MMOL/L — SIGNIFICANT CHANGE UP (ref 135–145)
WBC # BLD: 6.41 K/UL — SIGNIFICANT CHANGE UP (ref 3.8–10.5)
WBC # FLD AUTO: 6.41 K/UL — SIGNIFICANT CHANGE UP (ref 3.8–10.5)

## 2022-02-18 PROCEDURE — 93010 ELECTROCARDIOGRAM REPORT: CPT

## 2022-02-18 RX ORDER — SODIUM CHLORIDE 9 MG/ML
1000 INJECTION, SOLUTION INTRAVENOUS
Refills: 0 | Status: DISCONTINUED | OUTPATIENT
Start: 2022-03-02 | End: 2022-03-17

## 2022-02-18 RX ORDER — PANTOPRAZOLE SODIUM 20 MG/1
1 TABLET, DELAYED RELEASE ORAL
Qty: 0 | Refills: 0 | DISCHARGE

## 2022-02-18 RX ORDER — MYCOPHENOLATE MOFETIL 250 MG/1
2 CAPSULE ORAL
Qty: 0 | Refills: 0 | DISCHARGE

## 2022-02-18 RX ORDER — IMMUNE GLOBULIN,GAMMA(IGG) 5 %
0 VIAL (ML) INTRAVENOUS
Qty: 0 | Refills: 0 | DISCHARGE

## 2022-02-18 NOTE — H&P PST ADULT - NSICDXPASTMEDICALHX_GEN_ALL_CORE_FT
PAST MEDICAL HISTORY:  Diabetes     H/O myositis following vaccine 3/21    H/O sudden hearing loss     Hearing loss left     PAST MEDICAL HISTORY:  Diabetes     H/O myositis following vaccine 3/21    H/O sudden hearing loss     Hearing loss left    Inflammatory myopathy

## 2022-02-18 NOTE — H&P PST ADULT - PROBLEM SELECTOR PLAN 1
Pt scheduled for surgery  Endoscopy / Colonoscopy with Anesthesia with Dr Muse tentatively 3/2/22 -and preop instructions including instructions for taking own GI Protection on the day of surgery, given verbally and with use of  written materials, and patient confirming understanding of such instructions using  teach back method.  Request copy of MC from Dr Sousa

## 2022-02-18 NOTE — H&P PST ADULT - MUSCULOSKELETAL COMMENTS
Pt has good  strength - mild restriction to full ROM Hx of lower back pain with lifting - inflammatory myopathy 4/21 with reported decrease in strength

## 2022-02-18 NOTE — H&P PST ADULT - HISTORY OF PRESENT ILLNESS
Pt is a 61 yr old male scheduled for Endoscopy / Colonoscopy with Anesthesia with Dr Muse tentatively 3/2/22   Patient instructed to contact surgeon's office concerning COVID test prior to surgery  Pt is a 61 yr old male scheduled for Endoscopy / Colonoscopy with Anesthesia with Dr Muse tentatively 3/2/22 - pt to have screening for colon ca - hx of inflammatory myopathy - treated with Gammagard - started following 2nd vaccine 4/21 - pt lost 50 lbs and c/o of weakness/ dysphagia  - pt hx DM with steroid treatment - on low dose Insulin - pt is poor historian / clinic patient   Patient instructed to contact surgeon's office concerning COVID test prior to surgery

## 2022-03-01 NOTE — ASU PATIENT PROFILE, ADULT - FALL HARM RISK - UNIVERSAL INTERVENTIONS
Bed in lowest position, wheels locked, appropriate side rails in place/Call bell, personal items and telephone in reach/Instruct patient to call for assistance before getting out of bed or chair/Non-slip footwear when patient is out of bed/[place] to call system/Physically safe environment - no spills, clutter or unnecessary equipment/Purposeful Proactive Rounding/Room/bathroom lighting operational, light cord in reach

## 2022-03-02 ENCOUNTER — OUTPATIENT (OUTPATIENT)
Dept: OUTPATIENT SERVICES | Facility: HOSPITAL | Age: 62
LOS: 1 days | Discharge: HOME | End: 2022-03-02
Payer: MEDICAID

## 2022-03-02 ENCOUNTER — RESULT REVIEW (OUTPATIENT)
Age: 62
End: 2022-03-02

## 2022-03-02 VITALS
RESPIRATION RATE: 16 BRPM | HEART RATE: 90 BPM | SYSTOLIC BLOOD PRESSURE: 116 MMHG | OXYGEN SATURATION: 99 % | DIASTOLIC BLOOD PRESSURE: 58 MMHG

## 2022-03-02 VITALS
RESPIRATION RATE: 16 BRPM | SYSTOLIC BLOOD PRESSURE: 125 MMHG | OXYGEN SATURATION: 98 % | DIASTOLIC BLOOD PRESSURE: 70 MMHG | TEMPERATURE: 98 F | HEART RATE: 75 BPM | WEIGHT: 145.95 LBS

## 2022-03-02 DIAGNOSIS — R13.10 DYSPHAGIA, UNSPECIFIED: ICD-10-CM

## 2022-03-02 DIAGNOSIS — Z98.890 OTHER SPECIFIED POSTPROCEDURAL STATES: Chronic | ICD-10-CM

## 2022-03-02 LAB
GLUCOSE BLDC GLUCOMTR-MCNC: 192 MG/DL — HIGH (ref 70–99)
GLUCOSE BLDC GLUCOMTR-MCNC: 204 MG/DL — HIGH (ref 70–99)

## 2022-03-02 PROCEDURE — 43239 EGD BIOPSY SINGLE/MULTIPLE: CPT | Mod: GC

## 2022-03-02 PROCEDURE — 88305 TISSUE EXAM BY PATHOLOGIST: CPT | Mod: 26

## 2022-03-02 PROCEDURE — 45378 DIAGNOSTIC COLONOSCOPY: CPT | Mod: GC

## 2022-03-02 NOTE — ASU PREOP CHECKLIST - BLOOD AVAILABLE
SAFETY CHECKLIST  ID Bands and Risk clasps correct and in place (DNR, Fall risk, Allergy, Latex, Limb):  Yes  All Lines Reconciled and labeled correctly: Yes  Whiteboard updated:Yes  Environmental interventions (bed/chair alarm on, call light, side rails, restraints, sitter....): Yes  Verify Tele #: 3     n/a

## 2022-03-07 ENCOUNTER — RESULT REVIEW (OUTPATIENT)
Age: 62
End: 2022-03-07

## 2022-03-07 ENCOUNTER — OUTPATIENT (OUTPATIENT)
Dept: OUTPATIENT SERVICES | Facility: HOSPITAL | Age: 62
LOS: 1 days | End: 2022-03-07

## 2022-03-07 ENCOUNTER — APPOINTMENT (OUTPATIENT)
Dept: RHEUMATOLOGY | Facility: CLINIC | Age: 62
End: 2022-03-07
Payer: MEDICAID

## 2022-03-07 VITALS
OXYGEN SATURATION: 99 % | DIASTOLIC BLOOD PRESSURE: 70 MMHG | HEART RATE: 79 BPM | HEIGHT: 68 IN | TEMPERATURE: 97.8 F | BODY MASS INDEX: 23.21 KG/M2 | SYSTOLIC BLOOD PRESSURE: 120 MMHG | WEIGHT: 153.13 LBS

## 2022-03-07 DIAGNOSIS — Z98.890 OTHER SPECIFIED POSTPROCEDURAL STATES: Chronic | ICD-10-CM

## 2022-03-07 PROBLEM — Z86.69 PERSONAL HISTORY OF OTHER DISEASES OF THE NERVOUS SYSTEM AND SENSE ORGANS: Chronic | Status: ACTIVE | Noted: 2022-02-18

## 2022-03-07 PROBLEM — H91.90 UNSPECIFIED HEARING LOSS, UNSPECIFIED EAR: Chronic | Status: ACTIVE | Noted: 2022-02-18

## 2022-03-07 PROBLEM — G72.49 OTHER INFLAMMATORY AND IMMUNE MYOPATHIES, NOT ELSEWHERE CLASSIFIED: Chronic | Status: ACTIVE | Noted: 2022-02-18

## 2022-03-07 PROBLEM — Z87.39 PERSONAL HISTORY OF OTHER DISEASES OF THE MUSCULOSKELETAL SYSTEM AND CONNECTIVE TISSUE: Chronic | Status: ACTIVE | Noted: 2022-02-18

## 2022-03-07 LAB — SURGICAL PATHOLOGY STUDY: SIGNIFICANT CHANGE UP

## 2022-03-07 PROCEDURE — 99214 OFFICE O/P EST MOD 30 MIN: CPT | Mod: GC

## 2022-03-07 NOTE — HISTORY OF PRESENT ILLNESS
[___ Day(s) Ago] : [unfilled] day(s) ago [de-identified] : 1/24/22  [FreeTextEntry1] : current meds: cellcept 3g daily, Medrol 4mg daily  \par IVIG q 2 weeks \par requested forms for work, forms filled out \par had colonoscopy and endoscopy last week -> repeat colonoscopy  in 10 years

## 2022-03-07 NOTE — PHYSICAL EXAM
[General Appearance - Alert] : alert [General Appearance - In No Acute Distress] : in no acute distress [General Appearance - Well Nourished] : well nourished [Sclera] : the sclera and conjunctiva were normal [Extraocular Movements] : extraocular movements were intact [Outer Ear] : the ears and nose were normal in appearance [Neck Appearance] : the appearance of the neck was normal [Exaggerated Use Of Accessory Muscles For Inspiration] : no accessory muscle use [Auscultation Breath Sounds / Voice Sounds] : lungs were clear to auscultation bilaterally [Heart Rate And Rhythm] : heart rate was normal and rhythm regular [Heart Sounds] : normal S1 and S2 [Edema] : there was no peripheral edema [Abnormal Walk] : normal gait [Nail Clubbing] : no clubbing  or cyanosis of the fingernails [Musculoskeletal - Swelling] : no joint swelling seen [] : no rash [No Focal Deficits] : no focal deficits [Oriented To Time, Place, And Person] : oriented to person, place, and time [FreeTextEntry1] : strength: 10/10 proximal UE b/l,  10/10 distal UE b/l, LE:  7/10 on proximal LE b/l, 10/10 distal  LE b/l

## 2022-03-07 NOTE — ASSESSMENT
[FreeTextEntry1] : 61y man with DM, previous short term statin use presents with subacute progressively worsening proximal muscle weakness, dysphagia and 35 lb weight loss since April 2021, + [facility] Ab  here for necrotizing autoimmune myopathy \par \par #  Immune mediated necrotizing myopathy: \par - positive [facility] Ab, Natasha-1 negative. Myomarker panel negative \par - s/p Solu-Medrol 500 mg IV x 3 days (9/1- 9/3), s/p Solu- Medrol 60 mg (1mg/kg) 9/4-9/7, s/p IVIG x 5 days (9/2-9/6),d/c home on Medrol 24 mg BID \par - CK has been trending down - most recent 1200s  \par - muscle pathology: necrotic and degenerative muscle fibers, mild chronic inflammatory changes \par - LE muscle strength continue to improve 7/10 \par -c/w Vit D, PCP ppx, GI pp \par - on CellCept to 3g daily (max dose), tolerated well \par - on Medrol 4 mg daily now, will continue Medrol 4mg daily x 2 weeks, then 4mg every other day x 2 weeks \par - continue home IVIG (started on 10/19 - ) \par \par # COVID vaccine\par - UTD on COVID vaccine- 2nd dose March 2021\par - had high positive Calvin antibody in Sept 2021 and in Jan 2022 \par - pt hesitant to get booster shot - developed muscle weakness 2 weeks after 2nd shot \par - could give pt monoclonal antibody if he is exposed, instructed pt to call clinic if he tested positive or exposed. \par \par #HCM\par - Flu, PNA vaccines- UTD per pt \par - Needs routine age - appropriate cancer screening. f/u medicine\par - DEXA script given today - still has not complete yet \par - employee forms and home infusion forms filled out today  \par \par \par Case discussed with Dr. Sousa  \par f/u in 6 weeks \par \par \par

## 2022-03-08 DIAGNOSIS — G72.89 OTHER SPECIFIED MYOPATHIES: ICD-10-CM

## 2022-03-08 DIAGNOSIS — Z79.899 OTHER LONG TERM (CURRENT) DRUG THERAPY: ICD-10-CM

## 2022-04-18 ENCOUNTER — APPOINTMENT (OUTPATIENT)
Dept: RHEUMATOLOGY | Facility: CLINIC | Age: 62
End: 2022-04-18
Payer: MEDICAID

## 2022-04-18 ENCOUNTER — OUTPATIENT (OUTPATIENT)
Dept: OUTPATIENT SERVICES | Facility: HOSPITAL | Age: 62
LOS: 1 days | End: 2022-04-18

## 2022-04-18 VITALS
BODY MASS INDEX: 23.19 KG/M2 | TEMPERATURE: 96.8 F | WEIGHT: 153 LBS | DIASTOLIC BLOOD PRESSURE: 70 MMHG | HEART RATE: 101 BPM | SYSTOLIC BLOOD PRESSURE: 160 MMHG | OXYGEN SATURATION: 99 % | RESPIRATION RATE: 16 BRPM | HEIGHT: 68 IN

## 2022-04-18 DIAGNOSIS — Z98.890 OTHER SPECIFIED POSTPROCEDURAL STATES: Chronic | ICD-10-CM

## 2022-04-18 PROCEDURE — 99214 OFFICE O/P EST MOD 30 MIN: CPT | Mod: GC

## 2022-04-18 RX ORDER — PEN NEEDLE, DIABETIC 29 G X1/2"
29G X 12.7MM NEEDLE, DISPOSABLE MISCELLANEOUS
Qty: 100 | Refills: 0 | Status: ACTIVE | COMMUNITY
Start: 2022-01-24

## 2022-04-18 RX ORDER — SULFAMETHOXAZOLE AND TRIMETHOPRIM 400; 80 MG/1; MG/1
400-80 TABLET ORAL DAILY
Qty: 90 | Refills: 1 | Status: DISCONTINUED | COMMUNITY
Start: 1900-01-01 | End: 2022-04-18

## 2022-04-18 RX ORDER — METHYLPREDNISOLONE 24 MG
24 TABLET ORAL TWICE DAILY
Refills: 0 | Status: DISCONTINUED | COMMUNITY
End: 2022-04-18

## 2022-04-18 NOTE — PHYSICAL EXAM
[General Appearance - Alert] : alert [General Appearance - In No Acute Distress] : in no acute distress [General Appearance - Well Nourished] : well nourished [Extraocular Movements] : extraocular movements were intact [Sclera] : the sclera and conjunctiva were normal [Outer Ear] : the ears and nose were normal in appearance [Neck Appearance] : the appearance of the neck was normal [Exaggerated Use Of Accessory Muscles For Inspiration] : no accessory muscle use [Auscultation Breath Sounds / Voice Sounds] : lungs were clear to auscultation bilaterally [Heart Rate And Rhythm] : heart rate was normal and rhythm regular [Heart Sounds] : normal S1 and S2 [Edema] : there was no peripheral edema [Abnormal Walk] : normal gait [Nail Clubbing] : no clubbing  or cyanosis of the fingernails [Musculoskeletal - Swelling] : no joint swelling seen [] : no rash [No Focal Deficits] : no focal deficits [Oriented To Time, Place, And Person] : oriented to person, place, and time [FreeTextEntry1] : strength: 10/10 proximal UE b/l,  10/10 distal UE b/l, LE:  8/10 on proximal LE b/l, 10/10 distal  LE b/l ,  able to get up from chair without assistance (mild difficulty, 10 sets in 30 sec)

## 2022-04-18 NOTE — ASSESSMENT
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 Myomarker panel negative \par - s/p Solu-Medrol 500 mg IV x 3 days (9/1- 9/3/22), then on high dose \par - CK - most recent 1200s, has been trending up, though clinically strength has improved \par - muscle pathology: necrotic and degenerative muscle fibers, mild chronic inflammatory changes \par - LE muscle strength continue to improve 8/10 \par -c/w Vit D,  GI pp \par - on CellCept to 3g daily (max dose), tolerated well \par - taper steroid to Medrol 2mg every other day x 3 weeks, then stop \par - continue home IVIG (started on 10/19 - ) \par - ok to d/c bactrim \par \par # COVID vaccine\par - UTD on COVID vaccine- 2nd dose March 2021\par - had high positive Calvin antibody in Sept 2021 and in Jan 2022 \par - pt hesitant to get booster shot - developed muscle weakness 2 weeks after 2nd shot \par - could give pt monoclonal antibody if he is exposed, instructed pt to call clinic if he tested positive or exposed. \par \par #HCM\par - Flu, PNA vaccines- UTD per pt \par - DEXA - not complete \par - hep panel and quant-gold neg Sept 2021   \par ** given positive [facility], pt  should NOT be on statin for HLD (pt had remote statin exposure in the past) \par \par \par Case discussed with Dr. Sousa  \par f/u in 6 weeks \par \par \par

## 2022-04-18 NOTE — HISTORY OF PRESENT ILLNESS
[___ Day(s) Ago] : [unfilled] day(s) ago [de-identified] : 1/24/22  [FreeTextEntry1] : current meds: cellcept 3g daily, Medrol 4mg every other day \par IVIG q 2 weeks \par no dysphagia, strength continue to improve \par denies fever, chills, SOB.

## 2022-04-19 DIAGNOSIS — Z79.899 OTHER LONG TERM (CURRENT) DRUG THERAPY: ICD-10-CM

## 2022-04-19 DIAGNOSIS — M62.81 MUSCLE WEAKNESS (GENERALIZED): ICD-10-CM

## 2022-04-19 DIAGNOSIS — G72.49 OTHER INFLAMMATORY AND IMMUNE MYOPATHIES, NOT ELSEWHERE CLASSIFIED: ICD-10-CM

## 2022-04-19 DIAGNOSIS — G72.89 OTHER SPECIFIED MYOPATHIES: ICD-10-CM

## 2022-04-19 DIAGNOSIS — Z00.00 ENCOUNTER FOR GENERAL ADULT MEDICAL EXAMINATION WITHOUT ABNORMAL FINDINGS: ICD-10-CM

## 2022-04-22 ENCOUNTER — RESULT REVIEW (OUTPATIENT)
Age: 62
End: 2022-04-22

## 2022-04-22 ENCOUNTER — APPOINTMENT (OUTPATIENT)
Dept: INTERNAL MEDICINE | Facility: CLINIC | Age: 62
End: 2022-04-22
Payer: MEDICAID

## 2022-04-22 ENCOUNTER — OUTPATIENT (OUTPATIENT)
Dept: OUTPATIENT SERVICES | Facility: HOSPITAL | Age: 62
LOS: 1 days | End: 2022-04-22

## 2022-04-22 VITALS
DIASTOLIC BLOOD PRESSURE: 50 MMHG | HEART RATE: 89 BPM | WEIGHT: 157 LBS | RESPIRATION RATE: 16 BRPM | SYSTOLIC BLOOD PRESSURE: 140 MMHG | OXYGEN SATURATION: 99 % | BODY MASS INDEX: 23.79 KG/M2 | TEMPERATURE: 97.7 F | HEIGHT: 68 IN

## 2022-04-22 DIAGNOSIS — E11.9 TYPE 2 DIABETES MELLITUS W/OUT COMPLICATIONS: ICD-10-CM

## 2022-04-22 DIAGNOSIS — Z98.890 OTHER SPECIFIED POSTPROCEDURAL STATES: Chronic | ICD-10-CM

## 2022-04-22 LAB
ALBUMIN SERPL ELPH-MCNC: 4.6 G/DL — SIGNIFICANT CHANGE UP (ref 3.3–5)
ALP SERPL-CCNC: 115 U/L — SIGNIFICANT CHANGE UP (ref 40–120)
ALT FLD-CCNC: 31 U/L — SIGNIFICANT CHANGE UP (ref 4–41)
ANION GAP SERPL CALC-SCNC: 13 MMOL/L — SIGNIFICANT CHANGE UP (ref 7–14)
AST SERPL-CCNC: 40 U/L — SIGNIFICANT CHANGE UP (ref 4–40)
BASOPHILS # BLD AUTO: 0.02 K/UL — SIGNIFICANT CHANGE UP (ref 0–0.2)
BASOPHILS NFR BLD AUTO: 0.4 % — SIGNIFICANT CHANGE UP (ref 0–2)
BILIRUB SERPL-MCNC: 0.2 MG/DL — SIGNIFICANT CHANGE UP (ref 0.2–1.2)
BUN SERPL-MCNC: 12 MG/DL — SIGNIFICANT CHANGE UP (ref 7–23)
CALCIUM SERPL-MCNC: 9.4 MG/DL — SIGNIFICANT CHANGE UP (ref 8.4–10.5)
CHLORIDE SERPL-SCNC: 100 MMOL/L — SIGNIFICANT CHANGE UP (ref 98–107)
CHOLEST SERPL-MCNC: 211 MG/DL — HIGH
CK SERPL-CCNC: 2003 U/L — HIGH (ref 30–200)
CO2 SERPL-SCNC: 26 MMOL/L — SIGNIFICANT CHANGE UP (ref 22–31)
CREAT SERPL-MCNC: 0.66 MG/DL — SIGNIFICANT CHANGE UP (ref 0.5–1.3)
EGFR: 105 ML/MIN/1.73M2 — SIGNIFICANT CHANGE UP
EOSINOPHIL # BLD AUTO: 0.09 K/UL — SIGNIFICANT CHANGE UP (ref 0–0.5)
EOSINOPHIL NFR BLD AUTO: 1.8 % — SIGNIFICANT CHANGE UP (ref 0–6)
GLUCOSE SERPL-MCNC: 196 MG/DL — HIGH (ref 70–99)
HCT VFR BLD CALC: 38.1 % — LOW (ref 39–50)
HDLC SERPL-MCNC: 28 MG/DL — LOW
HGB BLD-MCNC: 12 G/DL — LOW (ref 13–17)
IANC: 2.75 K/UL — SIGNIFICANT CHANGE UP (ref 1.8–7.4)
IMM GRANULOCYTES NFR BLD AUTO: 0.6 % — SIGNIFICANT CHANGE UP (ref 0–1.5)
LIPID PNL WITH DIRECT LDL SERPL: 152 MG/DL — HIGH
LYMPHOCYTES # BLD AUTO: 1.74 K/UL — SIGNIFICANT CHANGE UP (ref 1–3.3)
LYMPHOCYTES # BLD AUTO: 34.2 % — SIGNIFICANT CHANGE UP (ref 13–44)
MCHC RBC-ENTMCNC: 28.7 PG — SIGNIFICANT CHANGE UP (ref 27–34)
MCHC RBC-ENTMCNC: 31.5 GM/DL — LOW (ref 32–36)
MCV RBC AUTO: 91.1 FL — SIGNIFICANT CHANGE UP (ref 80–100)
MONOCYTES # BLD AUTO: 0.46 K/UL — SIGNIFICANT CHANGE UP (ref 0–0.9)
MONOCYTES NFR BLD AUTO: 9 % — SIGNIFICANT CHANGE UP (ref 2–14)
NEUTROPHILS # BLD AUTO: 2.75 K/UL — SIGNIFICANT CHANGE UP (ref 1.8–7.4)
NEUTROPHILS NFR BLD AUTO: 54 % — SIGNIFICANT CHANGE UP (ref 43–77)
NON HDL CHOLESTEROL: 183 MG/DL — HIGH
NRBC # BLD: 0 /100 WBCS — SIGNIFICANT CHANGE UP
NRBC # FLD: 0 K/UL — SIGNIFICANT CHANGE UP
PLATELET # BLD AUTO: 200 K/UL — SIGNIFICANT CHANGE UP (ref 150–400)
POTASSIUM SERPL-MCNC: 4.4 MMOL/L — SIGNIFICANT CHANGE UP (ref 3.5–5.3)
POTASSIUM SERPL-SCNC: 4.4 MMOL/L — SIGNIFICANT CHANGE UP (ref 3.5–5.3)
PROT SERPL-MCNC: 8.4 G/DL — HIGH (ref 6–8.3)
RBC # BLD: 4.18 M/UL — LOW (ref 4.2–5.8)
RBC # FLD: 12.9 % — SIGNIFICANT CHANGE UP (ref 10.3–14.5)
SODIUM SERPL-SCNC: 139 MMOL/L — SIGNIFICANT CHANGE UP (ref 135–145)
TRIGL SERPL-MCNC: 156 MG/DL — HIGH
WBC # BLD: 5.09 K/UL — SIGNIFICANT CHANGE UP (ref 3.8–10.5)
WBC # FLD AUTO: 5.09 K/UL — SIGNIFICANT CHANGE UP (ref 3.8–10.5)

## 2022-04-22 PROCEDURE — 99214 OFFICE O/P EST MOD 30 MIN: CPT | Mod: GC

## 2022-04-22 RX ORDER — ASPIRIN 81 MG
81 TABLET, DELAYED RELEASE (ENTERIC COATED) ORAL
Refills: 0 | Status: DISCONTINUED | COMMUNITY
End: 2022-04-22

## 2022-04-22 RX ORDER — MULTIVIT-MIN/FOLIC/VIT K/LYCOP 400-300MCG
25 MCG TABLET ORAL
Refills: 0 | Status: DISCONTINUED | COMMUNITY
End: 2022-04-22

## 2022-04-22 RX ORDER — AZITHROMYCIN 500 MG/1
500 TABLET, FILM COATED ORAL DAILY
Qty: 5 | Refills: 0 | Status: DISCONTINUED | COMMUNITY
Start: 2021-09-27 | End: 2022-04-22

## 2022-04-22 RX ORDER — POLYETHYLENE GLYCOL 3350 17 G/17G
17 POWDER, FOR SOLUTION ORAL
Qty: 1 | Refills: 0 | Status: DISCONTINUED | COMMUNITY
Start: 2021-12-30 | End: 2022-04-22

## 2022-04-22 RX ORDER — POLYETHYLENE GLYCOL 3350 17 G/17G
17 POWDER, FOR SOLUTION ORAL
Qty: 238 | Refills: 0 | Status: DISCONTINUED | COMMUNITY
Start: 2021-08-10 | End: 2022-04-22

## 2022-04-22 RX ORDER — BISACODYL 5 MG/1
5 TABLET ORAL
Qty: 4 | Refills: 0 | Status: DISCONTINUED | COMMUNITY
Start: 2021-08-10 | End: 2022-04-22

## 2022-04-22 NOTE — PHYSICAL EXAM
[No Acute Distress] : no acute distress [Well Developed] : well developed [Normal Sclera/Conjunctiva] : normal sclera/conjunctiva [Normal Outer Ear/Nose] : the outer ears and nose were normal in appearance [No Lymphadenopathy] : no lymphadenopathy [No Respiratory Distress] : no respiratory distress  [No Accessory Muscle Use] : no accessory muscle use [Clear to Auscultation] : lungs were clear to auscultation bilaterally [Normal Rate] : normal rate  [Regular Rhythm] : with a regular rhythm [Normal S1, S2] : normal S1 and S2 [No Murmur] : no murmur heard [No Edema] : there was no peripheral edema [Soft] : abdomen soft [Non Tender] : non-tender [Non-distended] : non-distended [Normal Bowel Sounds] : normal bowel sounds [No Focal Deficits] : no focal deficits [Normal Affect] : the affect was normal

## 2022-04-23 LAB — HBV CORE AB SER-ACNC: REACTIVE

## 2022-04-26 DIAGNOSIS — G72.49 OTHER INFLAMMATORY AND IMMUNE MYOPATHIES, NOT ELSEWHERE CLASSIFIED: ICD-10-CM

## 2022-04-26 DIAGNOSIS — Z00.00 ENCOUNTER FOR GENERAL ADULT MEDICAL EXAMINATION WITHOUT ABNORMAL FINDINGS: ICD-10-CM

## 2022-04-26 DIAGNOSIS — E11.9 TYPE 2 DIABETES MELLITUS WITHOUT COMPLICATIONS: ICD-10-CM

## 2022-04-26 NOTE — REVIEW OF SYSTEMS
[Nasal Discharge] : nasal discharge [Cough] : cough [Fever] : no fever [Chills] : no chills [Night Sweats] : no night sweats [Vision Problems] : no vision problems [Chest Pain] : no chest pain [Palpitations] : no palpitations [Lower Ext Edema] : no lower extremity edema [Shortness Of Breath] : no shortness of breath [Wheezing] : no wheezing [Abdominal Pain] : no abdominal pain [Nausea] : no nausea [Constipation] : no constipation [Diarrhea] : no diarrhea

## 2022-04-26 NOTE — HISTORY OF PRESENT ILLNESS
[FreeTextEntry1] : follow up for T2DM [de-identified] : 62 Y/O M with T2DM and inflammatory myopathy who presents for follow up for glucose management.\par \par #Nasal congestion\par - no fever or chills\par -nasal congestion with rhinitis, no sneezing \par - per pt, granddaughter had cold last week\par - productive cough\par \par #T2DM\par - currently on levemir 8 units, has not has any issues with taking it \par - currently is doing fasting from 5 am - 7 pm\par - has not been monitoring glucose but did so earlier this week and fasting was 210's\par - A1c has increased from 6.7 to 8.2% \par - no hypoglcemic symptoms \par \par #inflammatory myositis\par - currently on cellcept 3g daily \par - still doing steroid taper \par - followed closely with Rheum (last visit 4/18)\par - currently doing home IVIG \par - has been discontinued off of Bactrim \par - still having some muscle weakness but much improved \par - has plans to return to gym and strengthen muscle \par \par \par #HCM\par - completed colonoscopy which was normal \par - will not do booster because Rheum mentioned his immunity is high (there was some suspicion myositis was 2/2 to 2nd COVID vaccine )\par - has not brought immunization for Tdap, Pneumo or Shringx but has completed it during hospitalization \par

## 2022-04-26 NOTE — ASSESSMENT
[FreeTextEntry1] : 62 Y/O M with T2DM and inflammatory myopathy who presents for follow up for glucose management.\par \par #Cold\par - most likely cold 2/2 to virus or allergies \par - recommended to get nasal saline and OTC Nyquil \par \par #T2DM\par - A1c has increased from 6.7 to 8.2% \par - no hypoglcemic symptoms \par - increasing levemir to 10 U\par \par #inflammatory myositis\par - c/wn cellcept 3g daily \par - c/w steroid taper with plan to d/c in 2 weeks per Rheum\par - c/w home IVIG \par - d/c'd Bactrim by Rheum \par \par #HCM\par - normal colonoscopy \par - will postpone COVID booster\par - ordered lipid, CBC, CMP, CPK\par - pt with elevated LFT, ordered Hep B and Hep C Ab \par \par Cased reviewed and discussed with Dr. Browne \par Follow up in 2 months \par \par Rekha Christina, PGY-2\par Psychiatry resident

## 2022-04-27 LAB — HCV RNA FLD QL NAA+PROBE: SIGNIFICANT CHANGE UP

## 2022-05-23 ENCOUNTER — APPOINTMENT (OUTPATIENT)
Dept: RHEUMATOLOGY | Facility: CLINIC | Age: 62
End: 2022-05-23
Payer: MEDICAID

## 2022-05-23 ENCOUNTER — OUTPATIENT (OUTPATIENT)
Dept: OUTPATIENT SERVICES | Facility: HOSPITAL | Age: 62
LOS: 1 days | End: 2022-05-23

## 2022-05-23 VITALS
HEART RATE: 78 BPM | TEMPERATURE: 97.8 F | BODY MASS INDEX: 24.25 KG/M2 | OXYGEN SATURATION: 98 % | HEIGHT: 68 IN | DIASTOLIC BLOOD PRESSURE: 62 MMHG | WEIGHT: 160 LBS | SYSTOLIC BLOOD PRESSURE: 138 MMHG

## 2022-05-23 DIAGNOSIS — Z98.890 OTHER SPECIFIED POSTPROCEDURAL STATES: Chronic | ICD-10-CM

## 2022-05-23 DIAGNOSIS — Z23 ENCOUNTER FOR IMMUNIZATION: ICD-10-CM

## 2022-05-23 PROCEDURE — 99214 OFFICE O/P EST MOD 30 MIN: CPT | Mod: GC

## 2022-05-23 RX ORDER — METHYLPREDNISOLONE 2 MG/1
2 TABLET ORAL
Qty: 30 | Refills: 0 | Status: COMPLETED | COMMUNITY
Start: 2021-09-15 | End: 2022-05-02

## 2022-05-24 LAB
ALBUMIN SERPL ELPH-MCNC: 4.5 G/DL
ALP BLD-CCNC: 115 U/L
ALT SERPL-CCNC: 31 U/L
ANION GAP SERPL CALC-SCNC: 12 MMOL/L
AST SERPL-CCNC: 33 U/L
BASOPHILS # BLD AUTO: 0.05 K/UL
BASOPHILS NFR BLD AUTO: 1 %
BILIRUB SERPL-MCNC: 0.2 MG/DL
BUN SERPL-MCNC: 13 MG/DL
CALCIUM SERPL-MCNC: 9.8 MG/DL
CHLORIDE SERPL-SCNC: 102 MMOL/L
CK SERPL-CCNC: 1994 U/L
CO2 SERPL-SCNC: 25 MMOL/L
CREAT SERPL-MCNC: 0.61 MG/DL
EGFR: 108 ML/MIN/1.73M2
EOSINOPHIL # BLD AUTO: 0.44 K/UL
EOSINOPHIL NFR BLD AUTO: 9.2 %
ERYTHROCYTE [SEDIMENTATION RATE] IN BLOOD BY WESTERGREN METHOD: 55 MM/HR
GLUCOSE SERPL-MCNC: 236 MG/DL
HCT VFR BLD CALC: 39.5 %
HEPB DNA PCR INT: NOT DETECTED
HEPB DNA PCR LOG: NOT DETECTED LOGIU/ML
HGB BLD-MCNC: 12 G/DL
IMM GRANULOCYTES NFR BLD AUTO: 0.4 %
LYMPHOCYTES # BLD AUTO: 1.97 K/UL
LYMPHOCYTES NFR BLD AUTO: 41.3 %
MAN DIFF?: NORMAL
MCHC RBC-ENTMCNC: 28.3 PG
MCHC RBC-ENTMCNC: 30.4 GM/DL
MCV RBC AUTO: 93.2 FL
MONOCYTES # BLD AUTO: 0.63 K/UL
MONOCYTES NFR BLD AUTO: 13.2 %
NEUTROPHILS # BLD AUTO: 1.66 K/UL
NEUTROPHILS NFR BLD AUTO: 34.9 %
PLATELET # BLD AUTO: 171 K/UL
POTASSIUM SERPL-SCNC: 5.4 MMOL/L
PROT SERPL-MCNC: 7.2 G/DL
RBC # BLD: 4.24 M/UL
RBC # FLD: 12.5 %
SODIUM SERPL-SCNC: 139 MMOL/L
WBC # FLD AUTO: 4.77 K/UL

## 2022-05-24 NOTE — ASU PREOP CHECKLIST - INTERNAL PROSTHESES
Patient Education        Diabetes Foot Health: Care Instructions  Your Care Instructions     When you have diabetes, your feet need extra care and attention. Diabetes can damage the nerve endings and blood vessels in your feet, making you less likely to notice when your feet are injured. Diabetes also limits your body's ability to fight infection and get blood to areas that need it. If you get a minor foot injury, it could become an ulcer or a serious infection. With good foot care,you can prevent most of these problems. Caring for your feet can be quick and easy. Most of the care can be done whenyou are bathing or getting ready for bed. Follow-up care is a key part of your treatment and safety. Be sure to make and go to all appointments, and call your doctor if you are having problems. It's also a good idea to know your test results and keep alist of the medicines you take. How can you care for yourself at home?  Keep your blood sugar close to normal by watching what and how much you eat, monitoring blood sugar, taking medicines if prescribed, and getting regular exercise.  Do not smoke. Smoking affects blood flow and can make foot problems worse. If you need help quitting, talk to your doctor about stop-smoking programs and medicines. These can increase your chances of quitting for good.  Eat a diet that is low in fats. High fat intake can cause fat to build up in your blood vessels and decrease blood flow.  Inspect your feet daily for blisters, cuts, cracks, or sores. If you cannot see well, use a mirror or have someone help you.  Take care of your feet:  ? Wash your feet every day. Use warm (not hot) water. Check the water temperature with your wrists or other part of your body, not your feet. ? Dry your feet well. Pat them dry. Do not rub the skin on your feet too hard. Dry well between your toes.  If the skin on your feet stays moist, bacteria or a fungus can grow, which can lead to infection. ? Keep your skin soft. Use moisturizing skin cream to keep the skin on your feet soft and prevent calluses and cracks. But do not put the cream between your toes, and stop using any cream that causes a rash. ? Clean underneath your toenails carefully. Do not use a sharp object to clean underneath your toenails. Use the blunt end of a nail file or other rounded tool. ? Trim and file your toenails straight across to prevent ingrown toenails. Use a nail clipper, not scissors. Use an emery board to smooth the edges.  Change socks daily. Socks without seams are best, because seams often rub the feet. You can find socks for people with diabetes from specialty catalogs.  Look inside your shoes every day for things like gravel or torn linings, which could cause blisters or sores.  Buy shoes that fit well:  ? Look for shoes that have plenty of space around the toes. This helps prevent bunions and blisters. ? Try on shoes while wearing the kind of socks you will usually wear with the shoes. ? Avoid plastic shoes. They may rub your feet and cause blisters. Good shoes should be made of materials that are flexible and breathable, such as leather or cloth. ? Break in new shoes slowly by wearing them for no more than an hour a day for several days. Take extra time to check your feet for red areas, blisters, or other problems after you wear new shoes.  Do not go barefoot. Do not wear sandals, and do not wear shoes with very thin soles. Thin soles are easy to puncture. They also do not protect your feet from hot pavement or cold weather.  Have your doctor check your feet during each visit. If you have a foot problem, see your doctor. Do not try to treat an early foot problem at home. Home remedies or treatments that you can buy without a prescription (such as corn removers) can be harmful.  Always get early treatment for foot problems.  A minor irritation can lead to a major problem if not properly cared for early. When should you call for help? Call your doctor now or seek immediate medical care if:     You have a foot sore, an ulcer or break in the skin that is not healing after 4 days, bleeding corns or calluses, or an ingrown toenail.      You have blue or black areas, which can mean bruising or blood flow problems.      You have peeling skin or tiny blisters between your toes or cracking or oozing of the skin.      You have a fever for more than 24 hours and a foot sore.      You have new numbness or tingling in your feet that does not go away after you move your feet or change positions.      You have unexplained or unusual swelling of the foot or ankle. Watch closely for changes in your health, and be sure to contact your doctor if:     You cannot do proper foot care. Where can you learn more? Go to https://Bilderopegifted2you.Handmade [email or web address]. org and sign in to your MyLifePlace account. Enter A739 in the DietBetter box to learn more about \"Diabetes Foot Health: Care Instructions. \"     If you do not have an account, please click on the \"Sign Up Now\" link. Current as of: July 28, 2021               Content Version: 13.2  © 2006-2022 [employer]. Care instructions adapted under license by [facility] ([facility]). If you have questions about a medical condition or this instruction, always ask your healthcare professional. [place] [address] any warranty or liability for your use of this information. Patient Education        Diabetic Kidney Disease: Care Instructions  Overview     Finding out that your kidneys have been damaged can be very distressing. It may have taken you by surprise, since damage to kidneys usually does not causesymptoms early on. It is normal to feel upset and afraid. Having diabetic kidney disease (sometimes called diabetic nephropathy) means that for some time you have had high blood sugar, which damages the kidneys.  Healthy kidneys keep protein in your blood, where it belongs. They also help to filter excess waste from your blood. Damaged kidneys do not work the way they should. They let protein pass into your urine and don't filter waste as theyshould. Sometimes diabetic kidney disease can lead to kidney failure. Your doctor will tell you how you might be able to slow damage to your kidneys. In many cases, prompt and regular treatment can prevent kidney failure. You will need to take medicine and may need to make a number of changes in your normal routines. If you can keep your blood sugar and blood pressure under control and take certain medicines, you may reduce your chance of kidneyfailure. Follow-up care is a key part of your treatment and safety. Be sure to make and go to all appointments, and call your doctor if you are having problems. It's also a good idea to know your test results and keep alist of the medicines you take. How can you care for yourself at home?  Take your medicines exactly as prescribed. It is very important that you take your insulin or other diabetes medicine as your doctor tells you. Call your doctor if you think you are having a problem with your medicine.  Try to keep blood sugar in your target range. ? Eat a variety of healthy foods and follow your meal plan to know how much carbohydrate you need for meals and snacks. Your doctor may restrict your protein. A dietitian can help you plan meals. ? If your doctor recommends it, get more exercise. Walking is a good choice. Bit by bit, increase the amount you walk every day. Try for at least 30 minutes on most days of the week. ? Check your blood sugar as often as your doctor recommends.  Take and record your blood pressure at home if your doctor tells you to. To take your blood pressure at home:  ? Ask your doctor to check your blood pressure monitor. Your doctor can make sure that it is accurate and that the cuff fits you.  Also ask your doctor to watch you to make sure that you are using it right. ? Do not use tobacco products or use medicine known to raise blood pressure (such as some nasal decongestant sprays) before taking your blood pressure. ? Avoid taking your blood pressure if you have just exercised or are nervous or upset. Rest at least 15 minutes before taking a reading.  Eat a low-salt diet to help keep your blood pressure in your target range.  Do not smoke. Smoking raises your risk of many health problems, including diabetic kidney disease. If you need help quitting, talk to your doctor about stop-smoking programs and medicines. These can increase your chances of quitting for good.  Do not take ibuprofen, naproxen, or similar medicines, unless your doctor tells you to. These medicines may make kidney problems worse. When should you call for help? Call 911 anytime you think you may need emergency care. For example, call if:     You passed out (lost consciousness). Call your doctor now or seek immediate medical care if:     You have new or worse nausea and vomiting.      You have much less urine than normal, or you have no urine.      You are feeling confused or cannot think clearly.      You have new or more blood in your urine.      You have new swelling.      You are dizzy or lightheaded, or feel like you may faint. Watch closely for changes in your health, and be sure to contact your doctor if:     You do not get better as expected. Where can you learn more? Go to [email or web address]://[email or web address].Thomsons Online [email or web address] org and sign in to your UserZoom account. Enter P361 in the [employer] [facility] box to learn more about \"Diabetic Kidney Disease: Care Instructions. \"     If you do not have an account, please click on the \"Sign Up Now\" link. Current as of: July 28, 2021               Content Version: 13.2  © 9887-2624 Healthwise, [employer]. Care instructions adapted under license by [facility] ([facility]).  If you have questions about a medical condition or this instruction, always ask your healthcare professional. [place] [address] any warranty or liability for your use of this information. no

## 2022-05-25 DIAGNOSIS — Z23 ENCOUNTER FOR IMMUNIZATION: ICD-10-CM

## 2022-05-25 DIAGNOSIS — G72.89 OTHER SPECIFIED MYOPATHIES: ICD-10-CM

## 2022-05-25 DIAGNOSIS — G72.49 OTHER INFLAMMATORY AND IMMUNE MYOPATHIES, NOT ELSEWHERE CLASSIFIED: ICD-10-CM

## 2022-05-25 DIAGNOSIS — Z79.899 OTHER LONG TERM (CURRENT) DRUG THERAPY: ICD-10-CM

## 2022-05-25 LAB — CRP SERPL-MCNC: 6 MG/L

## 2022-05-25 NOTE — ASSESSMENT
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 Myomarker panel negative \par - s/p Solu-Medrol 500 mg IV x 3 days (9/1- 9/3/22), then on high dose \par - CK - most recent 1200s, has been trending up, though clinically strength has improved \par - muscle pathology: necrotic and degenerative muscle fibers, mild chronic inflammatory changes \par - LE muscle strength continue to improve 8/10 \par -c/w Vit D,  GI pp \par - on CellCept to 3g daily (max dose), tolerated well \par - off steroid since beginning of May 2022  \par - continue home IVIG (started on 10/2019 - ) \par - ok to d/c bactrim \par - repeat labs today, if continue to improve, consider switch IVIG q2 weeks to monthly \par \par # COVID vaccine\par - UTD on COVID vaccine- 2nd dose March 2021\par - had high positive Calvin antibody in Sept 2021 and in Jan 2022 \par - pt hesitant to get booster shot - developed muscle weakness 2 weeks after 2nd shot \par - could give pt monoclonal antibody if he is exposed, instructed pt to call clinic if he tested positive or exposed. / discussed benefit of Evusheld for prevention\par \par #HCM\par - Flu, PNA vaccines- UTD per pt \par - DEXA - not complete \par - hep panel and quant-gold neg Sept 2021   \par ** given positive HMGCR, pt  should NOT be on statin for HLD (pt had remote statin exposure in the past) \par \par Case discussed with Dr. Sousa  \par f/u in 6 weeks \par \par \par

## 2022-05-25 NOTE — HISTORY OF PRESENT ILLNESS
[___ Day(s) Ago] : [unfilled] day(s) ago [de-identified] : 4/18/22  [FreeTextEntry1] : current meds: cellcept 3g daily, off steroid for 3 weeks \par on IVIG q 2 weeks, but due to scheduling issue, has not received IVIG for 1 month  \par no dysphagia, strength continue to improve \par denies fever, chills, SOB. \par CK 2000s on last visit

## 2022-05-25 NOTE — PHYSICAL EXAM
[General Appearance - Alert] : alert [General Appearance - In No Acute Distress] : in no acute distress [General Appearance - Well Nourished] : well nourished [Sclera] : the sclera and conjunctiva were normal [Extraocular Movements] : extraocular movements were intact [Outer Ear] : the ears and nose were normal in appearance [Neck Appearance] : the appearance of the neck was normal [Exaggerated Use Of Accessory Muscles For Inspiration] : no accessory muscle use [Auscultation Breath Sounds / Voice Sounds] : lungs were clear to auscultation bilaterally [Heart Rate And Rhythm] : heart rate was normal and rhythm regular [Heart Sounds] : normal S1 and S2 [Edema] : there was no peripheral edema [Abnormal Walk] : normal gait [Nail Clubbing] : no clubbing  or cyanosis of the fingernails [Musculoskeletal - Swelling] : no joint swelling seen [] : no rash [No Focal Deficits] : no focal deficits [Oriented To Time, Place, And Person] : oriented to person, place, and time [FreeTextEntry1] : strength: 10/10 proximal UE b/l,  10/10 distal UE b/l, LE:  8/10 on proximal LE b/l, 10/10 distal  LE b/l ,  able to get up from chair without assistance (mild difficulty, 10 sets in 30 sec)

## 2022-06-03 ENCOUNTER — RX RENEWAL (OUTPATIENT)
Age: 62
End: 2022-06-03

## 2022-07-25 ENCOUNTER — OUTPATIENT (OUTPATIENT)
Dept: OUTPATIENT SERVICES | Facility: HOSPITAL | Age: 62
LOS: 1 days | End: 2022-07-25

## 2022-07-25 ENCOUNTER — APPOINTMENT (OUTPATIENT)
Dept: RHEUMATOLOGY | Facility: CLINIC | Age: 62
End: 2022-07-25

## 2022-07-25 VITALS
WEIGHT: 164 LBS | HEART RATE: 74 BPM | DIASTOLIC BLOOD PRESSURE: 80 MMHG | OXYGEN SATURATION: 98 % | TEMPERATURE: 97.7 F | HEIGHT: 68 IN | BODY MASS INDEX: 24.86 KG/M2 | SYSTOLIC BLOOD PRESSURE: 140 MMHG

## 2022-07-25 DIAGNOSIS — Z98.890 OTHER SPECIFIED POSTPROCEDURAL STATES: Chronic | ICD-10-CM

## 2022-07-25 PROCEDURE — 99214 OFFICE O/P EST MOD 30 MIN: CPT | Mod: GC

## 2022-07-26 LAB
ALBUMIN SERPL ELPH-MCNC: 4.6 G/DL
ALP BLD-CCNC: 108 U/L
ALT SERPL-CCNC: 24 U/L
ANION GAP SERPL CALC-SCNC: 11 MMOL/L
AST SERPL-CCNC: 22 U/L
BASOPHILS # BLD AUTO: 0.02 K/UL
BASOPHILS NFR BLD AUTO: 0.5 %
BILIRUB SERPL-MCNC: 0.2 MG/DL
BUN SERPL-MCNC: 12 MG/DL
CALCIUM SERPL-MCNC: 9.3 MG/DL
CHLORIDE SERPL-SCNC: 102 MMOL/L
CK SERPL-CCNC: 708 U/L
CO2 SERPL-SCNC: 27 MMOL/L
CREAT SERPL-MCNC: 0.63 MG/DL
CRP SERPL-MCNC: <3 MG/L
EGFR: 107 ML/MIN/1.73M2
EOSINOPHIL # BLD AUTO: 0.09 K/UL
EOSINOPHIL NFR BLD AUTO: 2.2 %
ERYTHROCYTE [SEDIMENTATION RATE] IN BLOOD BY WESTERGREN METHOD: 35 MM/HR
GLUCOSE SERPL-MCNC: 224 MG/DL
HCT VFR BLD CALC: 38.4 %
HGB BLD-MCNC: 12.2 G/DL
IMM GRANULOCYTES NFR BLD AUTO: 0.2 %
LYMPHOCYTES # BLD AUTO: 1.71 K/UL
LYMPHOCYTES NFR BLD AUTO: 42.2 %
MAN DIFF?: NORMAL
MCHC RBC-ENTMCNC: 28.1 PG
MCHC RBC-ENTMCNC: 31.8 GM/DL
MCV RBC AUTO: 88.5 FL
MONOCYTES # BLD AUTO: 0.31 K/UL
MONOCYTES NFR BLD AUTO: 7.7 %
NEUTROPHILS # BLD AUTO: 1.91 K/UL
NEUTROPHILS NFR BLD AUTO: 47.2 %
PLATELET # BLD AUTO: 157 K/UL
POTASSIUM SERPL-SCNC: 5 MMOL/L
PROT SERPL-MCNC: 7.1 G/DL
RBC # BLD: 4.34 M/UL
RBC # FLD: 12.8 %
SODIUM SERPL-SCNC: 140 MMOL/L
WBC # FLD AUTO: 4.05 K/UL

## 2022-07-27 DIAGNOSIS — G72.49 OTHER INFLAMMATORY AND IMMUNE MYOPATHIES, NOT ELSEWHERE CLASSIFIED: ICD-10-CM

## 2022-07-27 DIAGNOSIS — G72.89 OTHER SPECIFIED MYOPATHIES: ICD-10-CM

## 2022-07-27 DIAGNOSIS — Z79.899 OTHER LONG TERM (CURRENT) DRUG THERAPY: ICD-10-CM

## 2022-07-27 NOTE — HISTORY OF PRESENT ILLNESS
[___ Day(s) Ago] : [unfilled] day(s) ago [de-identified] : 5/23/22  [FreeTextEntry1] : continue to feel well, weight gain, muscle strength improve\par no issue with swallowing food \par still on IVIG  \par denies fever, chills, SOB

## 2022-07-27 NOTE — PHYSICAL EXAM
[General Appearance - Alert] : alert [General Appearance - In No Acute Distress] : in no acute distress [General Appearance - Well Nourished] : well nourished [Sclera] : the sclera and conjunctiva were normal [Extraocular Movements] : extraocular movements were intact [Outer Ear] : the ears and nose were normal in appearance [Neck Appearance] : the appearance of the neck was normal [Exaggerated Use Of Accessory Muscles For Inspiration] : no accessory muscle use [Auscultation Breath Sounds / Voice Sounds] : lungs were clear to auscultation bilaterally [Heart Rate And Rhythm] : heart rate was normal and rhythm regular [Heart Sounds] : normal S1 and S2 [Edema] : there was no peripheral edema [Abnormal Walk] : normal gait [Nail Clubbing] : no clubbing  or cyanosis of the fingernails [Musculoskeletal - Swelling] : no joint swelling seen [] : no rash [No Focal Deficits] : no focal deficits [Oriented To Time, Place, And Person] : oriented to person, place, and time [FreeTextEntry1] : strength: 10/10 proximal UE b/l,  10/10 distal UE b/l, LE:  8/10 on proximal LE b/l, 10/10 distal  LE b/l ,  able to get up from chair without assistance (11 sets in 30 sec)

## 2022-07-27 NOTE — ASSESSMENT
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 Myomarker panel negative \par - s/p Solu-Medrol 500 mg IV x 3 days (9/1- 9/3/22), then on high dose \par - CK - most recent 1200s, has been trending up, though clinically strength has improved \par - muscle pathology: necrotic and degenerative muscle fibers, mild chronic inflammatory changes \par - LE muscle strength continue to improve 8/10 \par -c/w Vit D,  GI pp \par - on CellCept to 3g daily (max dose), tolerated well \par - off steroid since beginning of May 2022  \par - home IVIG (started on 10/2019 - ) on 2g/kg, will switch to lower dose 40g BID  \par \par # COVID vaccine\par - UTD on COVID vaccine- 2nd dose March 2021\par - had high positive Calvin antibody in Sept 2021 and in Jan 2022 \par - pt hesitant to get booster shot - developed muscle weakness 2 weeks after 2nd shot \par - could give pt monoclonal antibody if he is exposed, instructed pt to call clinic if he tested positive or exposed. \par - discussed benefit of Evusheld for prevention - pt is hesitant \par \par #HCM\par - Flu, PNA vaccines- UTD per pt \par - DEXA - not complete \par - hep panel and quant-gold neg Sept 2021   \par ** given positive HMGCR, pt  should NOT be on statin for HLD (pt had remote statin exposure in the past) \par \par Case discussed with Dr. Sousa  \par f/u 8 weeks \par \par

## 2022-08-05 ENCOUNTER — APPOINTMENT (OUTPATIENT)
Dept: INTERNAL MEDICINE | Facility: CLINIC | Age: 62
End: 2022-08-05

## 2022-08-08 ENCOUNTER — APPOINTMENT (OUTPATIENT)
Dept: RHEUMATOLOGY | Facility: CLINIC | Age: 62
End: 2022-08-08

## 2022-08-16 RX ORDER — ASPIRIN ENTERIC COATED TABLETS 81 MG 81 MG/1
81 TABLET, DELAYED RELEASE ORAL
Qty: 30 | Refills: 2 | Status: ACTIVE | COMMUNITY
Start: 2022-04-22 | End: 1900-01-01

## 2022-09-06 RX ORDER — ELECTROLYTES/DEXTROSE
29G X 12MM SOLUTION, ORAL ORAL
Qty: 30 | Refills: 3 | Status: DISCONTINUED | COMMUNITY
Start: 2021-12-08 | End: 2022-09-06

## 2022-09-06 RX ORDER — INSULIN DETEMIR 100 [IU]/ML
100 INJECTION, SOLUTION SUBCUTANEOUS
Qty: 3 | Refills: 0 | Status: DISCONTINUED | COMMUNITY
Start: 1900-01-01 | End: 2022-09-06

## 2022-10-14 ENCOUNTER — OUTPATIENT (OUTPATIENT)
Dept: OUTPATIENT SERVICES | Facility: HOSPITAL | Age: 62
LOS: 1 days | End: 2022-10-14

## 2022-10-14 ENCOUNTER — APPOINTMENT (OUTPATIENT)
Dept: INTERNAL MEDICINE | Facility: CLINIC | Age: 62
End: 2022-10-14

## 2022-10-14 VITALS
HEART RATE: 79 BPM | HEIGHT: 68 IN | OXYGEN SATURATION: 98 % | WEIGHT: 165 LBS | BODY MASS INDEX: 25.01 KG/M2 | TEMPERATURE: 95 F

## 2022-10-14 VITALS — DIASTOLIC BLOOD PRESSURE: 84 MMHG | SYSTOLIC BLOOD PRESSURE: 130 MMHG

## 2022-10-14 DIAGNOSIS — Z98.890 OTHER SPECIFIED POSTPROCEDURAL STATES: Chronic | ICD-10-CM

## 2022-10-14 LAB — HBA1C MFR BLD HPLC: 9.7

## 2022-10-14 PROCEDURE — 99214 OFFICE O/P EST MOD 30 MIN: CPT

## 2022-10-18 DIAGNOSIS — Z23 ENCOUNTER FOR IMMUNIZATION: ICD-10-CM

## 2022-10-18 DIAGNOSIS — E11.9 TYPE 2 DIABETES MELLITUS WITHOUT COMPLICATIONS: ICD-10-CM

## 2022-10-18 RX ORDER — INSULIN DETEMIR 100 [IU]/ML
100 INJECTION, SOLUTION SUBCUTANEOUS
Qty: 1 | Refills: 0 | Status: COMPLETED | COMMUNITY
Start: 2021-11-02 | End: 2022-10-18

## 2022-10-18 NOTE — HISTORY OF PRESENT ILLNESS
[FreeTextEntry1] : Follow-up for diabetes management.  [de-identified] : Patient is a 62 yo male with T2DM and inflammatory myopathy who presents for follow up for diabetes management. \par \par #T2DM \par Patient states that he has not taken his insulin within the last week (prescribed Levemir 10u QHS or before bedtime meal), but takes anywhere from 8-12 units, depending on what his fingerstick is. He stopped taking his insulin because he was informed by pharmacy that his insurance is no longer covering Levemir. He also states that he has not recently checked his fs at home. Patient denies hyperglycemic symptoms. Denies excessive thirst or urination and GI symptoms.  \par \par #Inflammatory myositis \par Currently, patient follows with B[facility]y here at [facility].He reports persistent muscle weakness in B/L LE and hands, but overall improved from last visit. He continues to take cellcept 3g QD, as prescribed. As per patient, company who was administering IVIG is no longer licensed in the state of [place] and he has not had infusion in several weeks. Advised patient to discuss this with Betsy, has upcoming appointment on 10/24. No longer on steroid taper. \par \par #Right shoulder pain\par Patient started experiencing with right-sided shoulder pain, localized specifically to his shoulder joint which started within the last few weeks. Patient states shoulder pain causes him to have difficulty with sleeping on his right side. Pain is "dull and achy" and occurs with certain movements. \par \par #HCM\par Patient had recent colonoscopy (and endoscopy) in Aug 2021 during hospitalization which was normal. He does not have immunization record for Tdap, Pneumo, or Shingrix. Advised to bring documentation to next appointment.

## 2022-10-18 NOTE — PHYSICAL EXAM
[No Acute Distress] : no acute distress [Well Nourished] : well nourished [Well Developed] : well developed [Normal Sclera/Conjunctiva] : normal sclera/conjunctiva [Normal Outer Ear/Nose] : the outer ears and nose were normal in appearance [No Respiratory Distress] : no respiratory distress  [Clear to Auscultation] : lungs were clear to auscultation bilaterally [Normal Rate] : normal rate  [Regular Rhythm] : with a regular rhythm [Normal S1, S2] : normal S1 and S2 [Normal] : soft, non-tender, non-distended, no masses palpated, no HSM and normal bowel sounds [No Rash] : no rash [Coordination Grossly Intact] : coordination grossly intact [Normal Affect] : the affect was normal [de-identified] : +Apley's Scratch Test

## 2022-10-18 NOTE — PLAN
[FreeTextEntry1] : Patient is a 60 yo male with hyperlipidemia, T2DM and inflammatory myopathy who presents for follow up for diabetes management. \par \par # T2DM\par A1c today was 9.7% likely elevated due to insulin non-compliance vs inadequate med management and  albumin/creatinine ratio from 9/22/2021 elevated\par - Increase Levemir to 13u at evening meal or at bedtime \par - Sent Steglatro 5mg QD to pharmacy (initially ordered Jardiance, but as per [facility] med not covered by patient's insurance)\par - c/w Aspirin 81mg QD for primary prevention of cardiovascular event \par - Educated patient about importance of monitoring glucose at least twice daily \par - repeat urine albumin/creatinine ratio today\par \par #Hyperlipidemia \par Lipid panel from 4/25/2022 was elevated \par - Patient with found to be +HMG-coA reductase antibody during hospitalization in 8/2021; unlikely statin -induced as patient states he has not taken statin in 3-4 years, but can be positive in statin -naive patients\par - Referral to lipid clinic for evaluation for possible initiation of PSK-9 inhibitor\par \par #Right shoulder pain \par +Apley's scratch test on exam likely 2/2 possible rotator cuff injury \par - Right shoulder x-ray to r/o fracture or dislocation\par - Advised patient to use Tylenol prn \par - Will re-evaluate at next visit \par \par #Inflammatory Myositis \par - Scheduled for Rheum appointment on 10/24\par - c/w cellcept 3g QD\par - Advised patient to discuss alternative options for IVIG treatment \par \par #GERD \par -c/w pantoprazole 40mg QD\par \par #HCM \par - Patient to bring documentation for Pnemo, Tdap, and Shingrix \par - Last colonoscopy in 2021 normal \par - Flu shot administered \par \par RTC in 5 weeks for follow-up on meds and diabetes management \par \par Case discussed with Dr. Browne\par \par Ritesh Larios\par PGY-1\par

## 2022-10-18 NOTE — REVIEW OF SYSTEMS
[Joint Pain] : joint pain [Muscle Weakness] : muscle weakness [Negative] : Psychiatric [Fever] : no fever [Chills] : no chills [Night Sweats] : no night sweats [Recent Change In Weight] : ~T no recent weight change [Pain] : no pain [Discharge] : no discharge [Itching] : no itching [Earache] : no earache [Hearing Loss] : no hearing loss [Nasal Discharge] : no nasal discharge [Chest Pain] : no chest pain [Palpitations] : no palpitations [Shortness Of Breath] : no shortness of breath [Wheezing] : no wheezing [Cough] : no cough [Abdominal Pain] : no abdominal pain [Nausea] : no nausea [Vomiting] : no vomiting [Muscle Pain] : no muscle pain [Back Pain] : no back pain [Itching] : no itching [Skin Rash] : no skin rash [Headache] : no headache [Dizziness] : no dizziness [FreeTextEntry9] : +right shoulder joint pain, B/L LE weakness

## 2022-10-19 LAB
CREAT SPEC-SCNC: 69 MG/DL
MICROALBUMIN 24H UR DL<=1MG/L-MCNC: 3.8 MG/DL
MICROALBUMIN/CREAT 24H UR-RTO: 55 MG/G
PSA SERPL-MCNC: 0.7 NG/ML

## 2022-10-24 ENCOUNTER — APPOINTMENT (OUTPATIENT)
Dept: RHEUMATOLOGY | Facility: CLINIC | Age: 62
End: 2022-10-24

## 2022-10-24 ENCOUNTER — OUTPATIENT (OUTPATIENT)
Dept: OUTPATIENT SERVICES | Facility: HOSPITAL | Age: 62
LOS: 1 days | End: 2022-10-24
Payer: MEDICAID

## 2022-10-24 ENCOUNTER — RESULT REVIEW (OUTPATIENT)
Age: 62
End: 2022-10-24

## 2022-10-24 ENCOUNTER — OUTPATIENT (OUTPATIENT)
Dept: OUTPATIENT SERVICES | Facility: HOSPITAL | Age: 62
LOS: 1 days | End: 2022-10-24

## 2022-10-24 ENCOUNTER — APPOINTMENT (OUTPATIENT)
Dept: RADIOLOGY | Facility: IMAGING CENTER | Age: 62
End: 2022-10-24

## 2022-10-24 VITALS
HEART RATE: 80 BPM | HEIGHT: 68 IN | SYSTOLIC BLOOD PRESSURE: 124 MMHG | BODY MASS INDEX: 24.55 KG/M2 | WEIGHT: 162 LBS | OXYGEN SATURATION: 96 % | TEMPERATURE: 97.2 F | DIASTOLIC BLOOD PRESSURE: 72 MMHG

## 2022-10-24 DIAGNOSIS — Z98.890 OTHER SPECIFIED POSTPROCEDURAL STATES: Chronic | ICD-10-CM

## 2022-10-24 DIAGNOSIS — G72.49 OTHER INFLAMMATORY AND IMMUNE MYOPATHIES, NOT ELSEWHERE CLASSIFIED: ICD-10-CM

## 2022-10-24 PROCEDURE — 99214 OFFICE O/P EST MOD 30 MIN: CPT | Mod: GC

## 2022-10-24 PROCEDURE — 73030 X-RAY EXAM OF SHOULDER: CPT | Mod: 26,RT

## 2022-10-24 PROCEDURE — 73030 X-RAY EXAM OF SHOULDER: CPT

## 2022-10-24 NOTE — ASSESSMENT
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 Myomarker panel negative \par - s/p Solu-Medrol 500 mg IV x 3 days (9/1- 9/3/22), then on high dose \par - CK - most recent 1200s, has been trending up, though clinically strength has improved \par - muscle pathology: necrotic and degenerative muscle fibers, mild chronic inflammatory changes \par - LE muscle strength continue to improve 8/10 \par -c/w Vit D,  GI pp \par - on CellCept to 3g daily (max dose), tolerated well \par - off steroid since beginning of May 2022  \par - home IVIG (started on 10/2019 - 9/2022) 40g BID. Last dose in 9/2022, company does have permission to provide IVIG in [place].  We recommend to hold off on IVIG. \par \par # COVID vaccine\par - UTD on COVID vaccine- 2nd dose March 2021\par - had high positive Calvin antibody in Sept 2021 and in Jan 2022 \par - pt hesitant to get booster shot - developed muscle weakness 2 weeks after 2nd shot \par - could give pt monoclonal antibody if he is exposed, instructed pt to call clinic if he tested positive or exposed. \par - discussed benefit of Evusheld for prevention - pt is hesitant \par \par #Right shoulder pain \par Rotator cuff injury \par US bedside no sign of tear \par Referral to PT\par \par #HCM\par - Flu, PNA vaccines- UTD per pt \par - DEXA - not complete \par - hep panel and quant-gold neg Sept 2021   \par ** given positive HMGCR, pt  should NOT be on statin for HLD (pt had remote statin exposure in the past) \par \par Case discussed with Dr. Sousa  \par f/u 2 months\par Bird Engel MD\par PGY 4 Rheumatology fellow\par

## 2022-10-24 NOTE — PHYSICAL EXAM
[General Appearance - Alert] : alert [General Appearance - In No Acute Distress] : in no acute distress [General Appearance - Well Nourished] : well nourished [Sclera] : the sclera and conjunctiva were normal [Extraocular Movements] : extraocular movements were intact [Outer Ear] : the ears and nose were normal in appearance [Neck Appearance] : the appearance of the neck was normal [Exaggerated Use Of Accessory Muscles For Inspiration] : no accessory muscle use [Auscultation Breath Sounds / Voice Sounds] : lungs were clear to auscultation bilaterally [Heart Rate And Rhythm] : heart rate was normal and rhythm regular [Heart Sounds] : normal S1 and S2 [Edema] : there was no peripheral edema [Abnormal Walk] : normal gait [Nail Clubbing] : no clubbing  or cyanosis of the fingernails [Musculoskeletal - Swelling] : no joint swelling seen [] : no rash [No Focal Deficits] : no focal deficits [Oriented To Time, Place, And Person] : oriented to person, place, and time [FreeTextEntry1] : strength: 10/10 proximal UE b/l,  10/10 distal UE b/l, LE:  10/10 on proximal LE b/l, 10/10 distal  LE b/l ,  able to get up from chair without assistance (10 sets in 25 sec) . R shoulder empty can positive, pain n internal rotation

## 2022-10-24 NOTE — REVIEW OF SYSTEMS
[As Noted in HPI] : as noted in HPI [Negative] : Gastrointestinal [FreeTextEntry9] : R shoulder pain

## 2022-10-24 NOTE — HISTORY OF PRESENT ILLNESS
[___ Day(s) Ago] : [unfilled] day(s) ago [Necrotizing Myopathy] : necrotizing myopathy [Pulse steroids] : pulse steroids [MTX] : methotrexate [IVIG] : IVIG [Myalgia] : myalgia [Weight loss over 2 kg] : weight loss over 2 kg [Lower extremities weakness] : lower extremities weakness [de-identified] : 10/24/22 [FreeTextEntry1] : continue to feel well, weight gain, muscle strength improve\par 10 repetition of standing from chair in 25 seconds\par no issue with swallowing food \par denies fever, chills, SOB [Cough] : no cough [Shortness of Breath] : no shortness of breath [Upper extremities] : no weakness in upper extremities [Lower extremities] : no weakness in lower extremities [Face] : no weakness in face [Dysphagia] : no dysphagia [Dysphonia] : no dysphonia [TextBox_2] : 9/9/2021 [TextBox_24] : thighb/l diffuse muscle edema and enhancement [TextBox_27] : bx showed sparsely distributed necrotic/regenerating fibers. Very mild chronic inflammatory infiltrates are seen mainly associated with necrotic foci.  [TextBox_50] : HMG-coA reductase antibody

## 2022-10-25 DIAGNOSIS — Z79.899 OTHER LONG TERM (CURRENT) DRUG THERAPY: ICD-10-CM

## 2022-10-25 DIAGNOSIS — G72.89 OTHER SPECIFIED MYOPATHIES: ICD-10-CM

## 2022-10-25 DIAGNOSIS — M75.101 UNSPECIFIED ROTATOR CUFF TEAR OR RUPTURE OF RIGHT SHOULDER, NOT SPECIFIED AS TRAUMATIC: ICD-10-CM

## 2022-10-26 ENCOUNTER — APPOINTMENT (OUTPATIENT)
Dept: CARDIOLOGY | Facility: CLINIC | Age: 62
End: 2022-10-26

## 2022-10-26 LAB
ALBUMIN SERPL ELPH-MCNC: 4.4 G/DL
ALP BLD-CCNC: 133 U/L
ALT SERPL-CCNC: 13 U/L
ANION GAP SERPL CALC-SCNC: 13 MMOL/L
AST SERPL-CCNC: 16 U/L
BASOPHILS # BLD AUTO: 0.03 K/UL
BASOPHILS NFR BLD AUTO: 0.6 %
BILIRUB SERPL-MCNC: 0.2 MG/DL
BUN SERPL-MCNC: 16 MG/DL
CALCIUM SERPL-MCNC: 9.1 MG/DL
CHLORIDE SERPL-SCNC: 100 MMOL/L
CK SERPL-CCNC: 234 U/L
CO2 SERPL-SCNC: 25 MMOL/L
CREAT SERPL-MCNC: 0.82 MG/DL
EGFR: 98 ML/MIN/1.73M2
EOSINOPHIL # BLD AUTO: 0.09 K/UL
EOSINOPHIL NFR BLD AUTO: 1.9 %
GLUCOSE SERPL-MCNC: 315 MG/DL
HCT VFR BLD CALC: 40.9 %
HGB BLD-MCNC: 12.9 G/DL
IMM GRANULOCYTES NFR BLD AUTO: 0.2 %
LYMPHOCYTES # BLD AUTO: 2.07 K/UL
LYMPHOCYTES NFR BLD AUTO: 43.1 %
MAN DIFF?: NORMAL
MCHC RBC-ENTMCNC: 27 PG
MCHC RBC-ENTMCNC: 31.5 GM/DL
MCV RBC AUTO: 85.7 FL
MONOCYTES # BLD AUTO: 0.31 K/UL
MONOCYTES NFR BLD AUTO: 6.5 %
NEUTROPHILS # BLD AUTO: 2.29 K/UL
NEUTROPHILS NFR BLD AUTO: 47.7 %
PLATELET # BLD AUTO: 182 K/UL
POTASSIUM SERPL-SCNC: 4.8 MMOL/L
PROT SERPL-MCNC: 6.9 G/DL
RBC # BLD: 4.77 M/UL
RBC # FLD: 12.1 %
SODIUM SERPL-SCNC: 138 MMOL/L
WBC # FLD AUTO: 4.8 K/UL

## 2022-10-28 NOTE — ED ADULT NURSE NOTE - CAS EDN DISCHARGE INTERVENTIONS
Arm band on/IV intact Dutasteride Pregnancy And Lactation Text: This medication is absolutely contraindicated in women, especially during pregnancy and breast feeding. Feminization of male fetuses is possible if taking while pregnant.

## 2022-11-18 ENCOUNTER — OUTPATIENT (OUTPATIENT)
Dept: OUTPATIENT SERVICES | Facility: HOSPITAL | Age: 62
LOS: 1 days | End: 2022-11-18

## 2022-11-18 ENCOUNTER — APPOINTMENT (OUTPATIENT)
Dept: INTERNAL MEDICINE | Facility: CLINIC | Age: 62
End: 2022-11-18

## 2022-11-18 VITALS
HEIGHT: 68 IN | OXYGEN SATURATION: 99 % | WEIGHT: 164 LBS | HEART RATE: 64 BPM | SYSTOLIC BLOOD PRESSURE: 146 MMHG | RESPIRATION RATE: 16 BRPM | TEMPERATURE: 97.3 F | BODY MASS INDEX: 24.86 KG/M2 | DIASTOLIC BLOOD PRESSURE: 76 MMHG

## 2022-11-18 DIAGNOSIS — Z98.890 OTHER SPECIFIED POSTPROCEDURAL STATES: Chronic | ICD-10-CM

## 2022-11-18 DIAGNOSIS — Z87.09 PERSONAL HISTORY OF OTHER DISEASES OF THE RESPIRATORY SYSTEM: ICD-10-CM

## 2022-11-18 PROCEDURE — 99214 OFFICE O/P EST MOD 30 MIN: CPT | Mod: GC

## 2022-11-18 RX ORDER — EMPAGLIFLOZIN 10 MG/1
10 TABLET, FILM COATED ORAL DAILY
Qty: 30 | Refills: 1 | Status: DISCONTINUED | COMMUNITY
Start: 2022-10-14 | End: 2022-11-18

## 2022-11-18 RX ORDER — IMMUNE GLOBULIN (HUMAN) 10 G/100ML
1 INJECTION INTRAVENOUS; SUBCUTANEOUS
Qty: 8 | Refills: 0 | Status: DISCONTINUED | OUTPATIENT
Start: 2021-09-13 | End: 2022-11-18

## 2022-11-18 RX ORDER — PANTOPRAZOLE SODIUM 40 MG/1
40 TABLET, DELAYED RELEASE ORAL
Refills: 0 | Status: DISCONTINUED | COMMUNITY
End: 2022-11-18

## 2022-11-28 NOTE — PHYSICAL EXAM
[No Acute Distress] : no acute distress [Well Nourished] : well nourished [Well Developed] : well developed [Normal Sclera/Conjunctiva] : normal sclera/conjunctiva [EOMI] : extraocular movements intact [Normal TMs] : both tympanic membranes were normal [No JVD] : no jugular venous distention [No Lymphadenopathy] : no lymphadenopathy [Thyroid Normal, No Nodules] : the thyroid was normal and there were no nodules present [No Respiratory Distress] : no respiratory distress  [No Accessory Muscle Use] : no accessory muscle use [Clear to Auscultation] : lungs were clear to auscultation bilaterally [Normal Rate] : normal rate  [Regular Rhythm] : with a regular rhythm [Normal S1, S2] : normal S1 and S2 [Pedal Pulses Present] : the pedal pulses are present [No Edema] : there was no peripheral edema [Soft] : abdomen soft [Non Tender] : non-tender [Non-distended] : non-distended [Normal Bowel Sounds] : normal bowel sounds [Grossly Normal Strength/Tone] : grossly normal strength/tone [Coordination Grossly Intact] : coordination grossly intact [No Focal Deficits] : no focal deficits [Normal Gait] : normal gait [Speech Grossly Normal] : speech grossly normal [Normal Affect] : the affect was normal [Normal Mood] : the mood was normal [Normal Insight/Judgement] : insight and judgment were intact [Comprehensive Foot Exam Normal] : Right and left foot were examined and both feet are normal. No ulcers in either foot. Toes are normal and with full ROM.  Normal tactile sensation with monofilament testing throughout both feet [de-identified] : shoulder strength 5/5; hip strength 5/5.  strength 5/5, Distal muscle strength 5/5; sensation intact

## 2022-11-28 NOTE — HISTORY OF PRESENT ILLNESS
[FreeTextEntry1] : f/u  [de-identified] : 61 Y/O M with HLD, T2DM and necrotizing autoimmune myopathy who presents for follow up for diabetes management. No acute complaints currently. \par \par #T2DM: states he has been compliant with glargine 12 U since has last visit. Previously was not taking his insulin due to his insurance not covering levemir. Reports elevated FSG between 270-280 's - he takes his FSG randomly throughout the day. Was prescribed steglatro at last visit however pt has not received it -- called pharmacy who stated pt needs prior auth.Patient denies hyperglycemic symptoms. Denies excessive thirst or urination and GI symptoms. Reports he goes to the gym consistently and does ~30 mins of intense exercise. \par \par  Pt not able tolerate trial on metformin due to his autoimmune myopathy putting him at higher risk for metformin associated myopathies/myalgias. 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 Reports his dysphagia has resolved and is eating and appropriately gaining weight. Pt was referred to lipid clinic however pt cancelled appointment. Able to do physical activity and is doing repetitive activity to improve muscle function. Reports he eats healthy diet with protein and has cut down significantly on his carb intake. He continues to take cellcept 3g QD, as prescribed.

## 2022-11-28 NOTE — ASSESSMENT
[FreeTextEntry1] : #HCM \par - Patient to bring documentation for Pnemo, Tdap, and Shingrix he received at his prior PCP. Will discuss Prevnar 20 at next visit \par - Flu: 10/22 \par - Colonoscopy (2021): normal \par \par RTC in 5 weeks for diabetes management \par \par Case discussed with Dr. St \par \par Jordyn Reis\par PGY-2 [facility] [employer] [facility] \par

## 2022-11-28 NOTE — REVIEW OF SYSTEMS
[Muscle Weakness] : muscle weakness [Fever] : no fever [Chills] : no chills [Fatigue] : no fatigue [Night Sweats] : no night sweats [Discharge] : no discharge [Pain] : no pain [Vision Problems] : no vision problems [Hearing Loss] : no hearing loss [Sore Throat] : no sore throat [Chest Pain] : no chest pain [Palpitations] : no palpitations [Claudication] : no  leg claudication [Lower Ext Edema] : no lower extremity edema [Orthopena] : no orthopnea [Shortness Of Breath] : no shortness of breath [Wheezing] : no wheezing [Cough] : no cough [Dyspnea on Exertion] : not dyspnea on exertion [Abdominal Pain] : no abdominal pain [Nausea] : no nausea [Constipation] : no constipation [Vomiting] : no vomiting [Heartburn] : no heartburn [Dysuria] : no dysuria [Incontinence] : no incontinence [Hesitancy] : no hesitancy [Hematuria] : no hematuria [Back Pain] : no back pain [Joint Swelling] : no joint swelling [Itching] : no itching [Skin Rash] : no skin rash [Headache] : no headache [Dizziness] : no dizziness [Fainting] : no fainting [Memory Loss] : no memory loss [Easy Bleeding] : no easy bleeding [Easy Bruising] : no easy bruising [Swollen Glands] : no swollen glands [FreeTextEntry7] : chronic constipation [FreeTextEntry9] : + R. shoulder pain

## 2022-11-29 DIAGNOSIS — G72.49 OTHER INFLAMMATORY AND IMMUNE MYOPATHIES, NOT ELSEWHERE CLASSIFIED: ICD-10-CM

## 2022-11-29 DIAGNOSIS — E78.5 HYPERLIPIDEMIA, UNSPECIFIED: ICD-10-CM

## 2022-11-29 DIAGNOSIS — E11.9 TYPE 2 DIABETES MELLITUS WITHOUT COMPLICATIONS: ICD-10-CM

## 2022-12-22 ENCOUNTER — RX RENEWAL (OUTPATIENT)
Age: 62
End: 2022-12-22

## 2022-12-23 ENCOUNTER — APPOINTMENT (OUTPATIENT)
Dept: INTERNAL MEDICINE | Facility: CLINIC | Age: 62
End: 2022-12-23

## 2023-01-17 NOTE — DISCHARGE NOTE PROVIDER - EXTENDED VTE YES NO FOR MLM ASPIRIN
, Same Histology In Subsequent Stages Text: The pattern and morphology of the tumor is as described in the first stage.

## 2023-01-26 ENCOUNTER — RX RENEWAL (OUTPATIENT)
Age: 63
End: 2023-01-26

## 2023-01-30 ENCOUNTER — APPOINTMENT (OUTPATIENT)
Dept: RHEUMATOLOGY | Facility: CLINIC | Age: 63
End: 2023-01-30
Payer: MEDICAID

## 2023-01-30 ENCOUNTER — OUTPATIENT (OUTPATIENT)
Dept: OUTPATIENT SERVICES | Facility: HOSPITAL | Age: 63
LOS: 1 days | End: 2023-01-30

## 2023-01-30 VITALS
OXYGEN SATURATION: 98 % | BODY MASS INDEX: 24.25 KG/M2 | SYSTOLIC BLOOD PRESSURE: 114 MMHG | HEIGHT: 68 IN | HEART RATE: 67 BPM | WEIGHT: 160 LBS | DIASTOLIC BLOOD PRESSURE: 80 MMHG

## 2023-01-30 DIAGNOSIS — M85.80 OTHER SPECIFIED DISORDERS OF BONE DENSITY AND STRUCTURE, UNSPECIFIED SITE: ICD-10-CM

## 2023-01-30 DIAGNOSIS — Z98.890 OTHER SPECIFIED POSTPROCEDURAL STATES: Chronic | ICD-10-CM

## 2023-01-30 DIAGNOSIS — M25.9 JOINT DISORDER, UNSPECIFIED: ICD-10-CM

## 2023-01-30 DIAGNOSIS — M75.101 UNSPECIFIED ROTATOR CUFF TEAR OR RUPTURE OF RIGHT SHOULDER, NOT SPECIFIED AS TRAUMATIC: ICD-10-CM

## 2023-01-30 DIAGNOSIS — T38.0X5A OTHER SPECIFIED DISORDERS OF BONE DENSITY AND STRUCTURE, UNSPECIFIED SITE: ICD-10-CM

## 2023-01-30 PROCEDURE — 99214 OFFICE O/P EST MOD 30 MIN: CPT | Mod: GC

## 2023-02-01 DIAGNOSIS — M25.511 PAIN IN RIGHT SHOULDER: ICD-10-CM

## 2023-02-01 DIAGNOSIS — Z79.899 OTHER LONG TERM (CURRENT) DRUG THERAPY: ICD-10-CM

## 2023-02-01 DIAGNOSIS — M75.51 BURSITIS OF RIGHT SHOULDER: ICD-10-CM

## 2023-02-01 DIAGNOSIS — M25.9 JOINT DISORDER, UNSPECIFIED: ICD-10-CM

## 2023-02-01 DIAGNOSIS — G72.89 OTHER SPECIFIED MYOPATHIES: ICD-10-CM

## 2023-02-01 DIAGNOSIS — M75.101 UNSPECIFIED ROTATOR CUFF TEAR OR RUPTURE OF RIGHT SHOULDER, NOT SPECIFIED AS TRAUMATIC: ICD-10-CM

## 2023-02-01 DIAGNOSIS — M85.80 OTHER SPECIFIED DISORDERS OF BONE DENSITY AND STRUCTURE, UNSPECIFIED SITE: ICD-10-CM

## 2023-02-01 LAB
ALBUMIN SERPL ELPH-MCNC: 4.6 G/DL
ALP BLD-CCNC: 129 U/L
ALT SERPL-CCNC: 17 U/L
ANION GAP SERPL CALC-SCNC: 18 MMOL/L
AST SERPL-CCNC: 15 U/L
BASOPHILS # BLD AUTO: 0.02 K/UL
BASOPHILS NFR BLD AUTO: 0.4 %
BILIRUB SERPL-MCNC: 0.2 MG/DL
BUN SERPL-MCNC: 18 MG/DL
CALCIUM SERPL-MCNC: 9.9 MG/DL
CHLORIDE SERPL-SCNC: 103 MMOL/L
CK SERPL-CCNC: 350 U/L
CO2 SERPL-SCNC: 19 MMOL/L
CREAT SERPL-MCNC: 0.67 MG/DL
EGFR: 104 ML/MIN/1.73M2
EOSINOPHIL # BLD AUTO: 0.13 K/UL
EOSINOPHIL NFR BLD AUTO: 2.4 %
GLUCOSE SERPL-MCNC: 285 MG/DL
HCT VFR BLD CALC: 42.2 %
HGB BLD-MCNC: 12.7 G/DL
IMM GRANULOCYTES NFR BLD AUTO: 0.4 %
LYMPHOCYTES # BLD AUTO: 2.08 K/UL
LYMPHOCYTES NFR BLD AUTO: 39.2 %
MAN DIFF?: NORMAL
MCHC RBC-ENTMCNC: 26.7 PG
MCHC RBC-ENTMCNC: 30.1 GM/DL
MCV RBC AUTO: 88.7 FL
MONOCYTES # BLD AUTO: 0.36 K/UL
MONOCYTES NFR BLD AUTO: 6.8 %
NEUTROPHILS # BLD AUTO: 2.7 K/UL
NEUTROPHILS NFR BLD AUTO: 50.8 %
PLATELET # BLD AUTO: 153 K/UL
POTASSIUM SERPL-SCNC: 4.3 MMOL/L
PROT SERPL-MCNC: 7.2 G/DL
RBC # BLD: 4.76 M/UL
RBC # FLD: 12.6 %
SODIUM SERPL-SCNC: 141 MMOL/L
WBC # FLD AUTO: 5.31 K/UL

## 2023-02-01 NOTE — ASSESSMENT
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 Myomarker panel negative \par - s/p Solu-Medrol 500 mg IV x 3 days (9/1- 9/3/22), then on high dose \par - CK - most recent 1200s, has been trending up, though clinically strength has improved \par - muscle pathology: necrotic and degenerative muscle fibers, mild chronic inflammatory changes \par - LE muscle strength continue to improve 8/10 \par -c/w Vit D,  GI pp \par - on CellCept to 3g daily (max dose), tolerated well \par - off steroid since beginning of May 2022  \par - home IVIG (started on 10/2019 - 9/2022) \par \par # COVID vaccine\par - UTD on COVID vaccine- 2nd dose March 2021\par - had high positive Calvin antibody in Sept 2021 and in Jan 2022 \par - pt hesitant to get booster shot - developed muscle weakness 2 weeks after 2nd shot \par - could give pt monoclonal antibody if he is exposed, instructed pt to call clinic if he tested positive or exposed. \par - discussed benefit of Evulsed for prevention - pt is hesitant \par \par #Right shoulder pain \par Rotator cuff injury \par US bedside no sign of tear \par XR R shoulder normal on 10/24/2022\par Referral to PT\par Check MRI of the R shoulder \par \par #HCM\par - Flu, PNA vaccines- UTD per pt \par - DEXA - not complete \par - Need to repeat Quantiferon and hepatitis panel next visit    \par ** given positive HMGCR, pt  should NOT be on statin for HLD (pt had remote statin exposure in the past) \par \par Case discussed with Dr. Sousa  \par f/u 2 months\par Bird Engel MD\par PGY 4 Rheumatology fellow\par

## 2023-02-01 NOTE — PHYSICAL EXAM
[General Appearance - Alert] : alert [General Appearance - In No Acute Distress] : in no acute distress [General Appearance - Well Nourished] : well nourished [Sclera] : the sclera and conjunctiva were normal [Extraocular Movements] : extraocular movements were intact [Outer Ear] : the ears and nose were normal in appearance [Neck Appearance] : the appearance of the neck was normal [Exaggerated Use Of Accessory Muscles For Inspiration] : no accessory muscle use [Auscultation Breath Sounds / Voice Sounds] : lungs were clear to auscultation bilaterally [Heart Rate And Rhythm] : heart rate was normal and rhythm regular [Heart Sounds] : normal S1 and S2 [Edema] : there was no peripheral edema [Abnormal Walk] : normal gait [Nail Clubbing] : no clubbing  or cyanosis of the fingernails [Musculoskeletal - Swelling] : no joint swelling seen [] : no rash [No Focal Deficits] : no focal deficits [Oriented To Time, Place, And Person] : oriented to person, place, and time [FreeTextEntry1] : strength: 10/10 proximal UE b/l,  10/10 distal UE b/l, LE:  10/10 on proximal LE b/l, 10/10 distal  LE b/l ,  able to get up from chair without assistance (13 sets in 30 sec) . R shoulder empty can positive, pain n internal rotation

## 2023-02-01 NOTE — HISTORY OF PRESENT ILLNESS
[___ Day(s) Ago] : [unfilled] day(s) ago [Necrotizing Myopathy] : necrotizing myopathy [Pulse steroids] : pulse steroids [MTX] : methotrexate [IVIG] : IVIG [Myalgia] : myalgia [Weight loss over 2 kg] : weight loss over 2 kg [Lower extremities weakness] : lower extremities weakness [de-identified] : 1/30/23 [FreeTextEntry1] : continue to feel well, weight gain, muscle strength improve. \par Chair sit-to-stand 13 repetitions in 30 seconds. \par Patient remains w/R shoulder pain \par \par ROS:\par Positive: R shoulder pain\par Negative: gait issue, SOB, chest pain, rash, weakness, myalgias, fever, N&V  [Cough] : no cough [Shortness of Breath] : no shortness of breath [Upper extremities] : no weakness in upper extremities [Lower extremities] : no weakness in lower extremities [Face] : no weakness in face [Dysphagia] : no dysphagia [Dysphonia] : no dysphonia [TextBox_2] : 9/9/2021 [TextBox_24] : thighb/l diffuse muscle edema and enhancement [TextBox_27] : bx showed sparsely distributed necrotic/regenerating fibers. Very mild chronic inflammatory infiltrates are seen mainly associated with necrotic foci.  [TextBox_50] : HMG-coA reductase antibody

## 2023-02-28 ENCOUNTER — APPOINTMENT (OUTPATIENT)
Dept: OPHTHALMOLOGY | Facility: CLINIC | Age: 63
End: 2023-02-28

## 2023-03-08 ENCOUNTER — RX RENEWAL (OUTPATIENT)
Age: 63
End: 2023-03-08

## 2023-03-30 ENCOUNTER — EMERGENCY (EMERGENCY)
Facility: HOSPITAL | Age: 63
LOS: 1 days | End: 2023-03-30
Attending: STUDENT IN AN ORGANIZED HEALTH CARE EDUCATION/TRAINING PROGRAM
Payer: MEDICAID

## 2023-03-30 VITALS
HEART RATE: 81 BPM | DIASTOLIC BLOOD PRESSURE: 93 MMHG | SYSTOLIC BLOOD PRESSURE: 177 MMHG | OXYGEN SATURATION: 99 % | RESPIRATION RATE: 17 BRPM | TEMPERATURE: 99 F

## 2023-03-30 VITALS
SYSTOLIC BLOOD PRESSURE: 145 MMHG | DIASTOLIC BLOOD PRESSURE: 76 MMHG | TEMPERATURE: 98 F | OXYGEN SATURATION: 99 % | HEART RATE: 80 BPM | RESPIRATION RATE: 18 BRPM

## 2023-03-30 DIAGNOSIS — Z98.890 OTHER SPECIFIED POSTPROCEDURAL STATES: Chronic | ICD-10-CM

## 2023-03-30 LAB
ALBUMIN SERPL ELPH-MCNC: 4.5 G/DL — SIGNIFICANT CHANGE UP (ref 3.3–5)
ALP SERPL-CCNC: 110 U/L — SIGNIFICANT CHANGE UP (ref 40–120)
ALT FLD-CCNC: 15 U/L — SIGNIFICANT CHANGE UP (ref 10–45)
AST SERPL-CCNC: 15 U/L — SIGNIFICANT CHANGE UP (ref 10–40)
BASOPHILS # BLD AUTO: 0.02 K/UL — SIGNIFICANT CHANGE UP (ref 0–0.2)
BASOPHILS NFR BLD AUTO: 0.4 % — SIGNIFICANT CHANGE UP (ref 0–2)
BILIRUB SERPL-MCNC: 0.2 MG/DL — SIGNIFICANT CHANGE UP (ref 0.2–1.2)
BUN SERPL-MCNC: 17 MG/DL — SIGNIFICANT CHANGE UP (ref 7–23)
CALCIUM SERPL-MCNC: 9.3 MG/DL — SIGNIFICANT CHANGE UP (ref 8.4–10.5)
CHLORIDE SERPL-SCNC: 97 MMOL/L — SIGNIFICANT CHANGE UP (ref 96–108)
CO2 SERPL-SCNC: 29 MMOL/L — SIGNIFICANT CHANGE UP (ref 22–31)
CREAT SERPL-MCNC: 0.81 MG/DL — SIGNIFICANT CHANGE UP (ref 0.5–1.3)
EGFR: 98 ML/MIN/1.73M2 — SIGNIFICANT CHANGE UP
EOSINOPHIL # BLD AUTO: 0.17 K/UL — SIGNIFICANT CHANGE UP (ref 0–0.5)
EOSINOPHIL NFR BLD AUTO: 3 % — SIGNIFICANT CHANGE UP (ref 0–6)
GLUCOSE SERPL-MCNC: 289 MG/DL — HIGH (ref 70–99)
HCT VFR BLD CALC: 39.8 % — SIGNIFICANT CHANGE UP (ref 39–50)
HGB BLD-MCNC: 12.7 G/DL — LOW (ref 13–17)
IMM GRANULOCYTES NFR BLD AUTO: 0.4 % — SIGNIFICANT CHANGE UP (ref 0–0.9)
LYMPHOCYTES # BLD AUTO: 1.75 K/UL — SIGNIFICANT CHANGE UP (ref 1–3.3)
LYMPHOCYTES # BLD AUTO: 31.3 % — SIGNIFICANT CHANGE UP (ref 13–44)
MCHC RBC-ENTMCNC: 27.9 PG — SIGNIFICANT CHANGE UP (ref 27–34)
MCHC RBC-ENTMCNC: 31.9 GM/DL — LOW (ref 32–36)
MCV RBC AUTO: 87.3 FL — SIGNIFICANT CHANGE UP (ref 80–100)
MONOCYTES # BLD AUTO: 0.48 K/UL — SIGNIFICANT CHANGE UP (ref 0–0.9)
MONOCYTES NFR BLD AUTO: 8.6 % — SIGNIFICANT CHANGE UP (ref 2–14)
NEUTROPHILS # BLD AUTO: 3.16 K/UL — SIGNIFICANT CHANGE UP (ref 1.8–7.4)
NEUTROPHILS NFR BLD AUTO: 56.3 % — SIGNIFICANT CHANGE UP (ref 43–77)
NRBC # BLD: 0 /100 WBCS — SIGNIFICANT CHANGE UP (ref 0–0)
PLATELET # BLD AUTO: 176 K/UL — SIGNIFICANT CHANGE UP (ref 150–400)
POTASSIUM SERPL-MCNC: 4 MMOL/L — SIGNIFICANT CHANGE UP (ref 3.5–5.3)
POTASSIUM SERPL-SCNC: 4 MMOL/L — SIGNIFICANT CHANGE UP (ref 3.5–5.3)
PROT SERPL-MCNC: 7.3 G/DL — SIGNIFICANT CHANGE UP (ref 6–8.3)
RBC # BLD: 4.56 M/UL — SIGNIFICANT CHANGE UP (ref 4.2–5.8)
RBC # FLD: 12.3 % — SIGNIFICANT CHANGE UP (ref 10.3–14.5)
SODIUM SERPL-SCNC: 148 MMOL/L — HIGH (ref 135–145)
WBC # BLD: 5.6 K/UL — SIGNIFICANT CHANGE UP (ref 3.8–10.5)
WBC # FLD AUTO: 5.6 K/UL — SIGNIFICANT CHANGE UP (ref 3.8–10.5)

## 2023-03-30 PROCEDURE — 36415 COLL VENOUS BLD VENIPUNCTURE: CPT

## 2023-03-30 PROCEDURE — 99284 EMERGENCY DEPT VISIT MOD MDM: CPT

## 2023-03-30 PROCEDURE — 80053 COMPREHEN METABOLIC PANEL: CPT

## 2023-03-30 PROCEDURE — 85025 COMPLETE CBC W/AUTO DIFF WBC: CPT

## 2023-03-30 RX ORDER — ACETAMINOPHEN 500 MG
650 TABLET ORAL ONCE
Refills: 0 | Status: COMPLETED | OUTPATIENT
Start: 2023-03-30 | End: 2023-03-30

## 2023-03-30 RX ADMIN — Medication 650 MILLIGRAM(S): at 22:14

## 2023-03-30 RX ADMIN — Medication 1 TABLET(S): at 22:14

## 2023-03-30 NOTE — ED PROVIDER NOTE - NSFOLLOWUPINSTRUCTIONS_ED_ALL_ED_FT
Abscess/Furuncle     An abscess is an infected area that contains a collection of pus and debris. It can occur in almost any part of the body and occurs when the tissue gets infection. Symptoms include a painful mass that is red, warm, tender that might break open and HAVE drainage. If your health care provider gave you antibiotics make sure to take the full course and do not stop even if feeling better.     SEEK IMMEDIATE MEDICAL CARE IF YOU HAVE ANY OF THE FOLLOWING SYMPTOMS: chills, fever, muscle aches, or red streaking from the area.

## 2023-03-30 NOTE — ED PROVIDER NOTE - CLINICAL SUMMARY MEDICAL DECISION MAKING FREE TEXT BOX
62-year-old male with a history of necrotizing autoimmune myopathy, DM, presents with left lower abdomen abscess/furuncle for the past 3 to 4 days.  Patient says lesion occurred after shaving, has been spontaneously draining purulent material at home.  Present due to significant pain, denies fever/chills, nausea/vomiting, diarrhea/constipation, dysuria.  AVSS, erythematous pustule noted in left lower quadrant of abdomen otherwise abdomen nontender and soft.  Fungal versus abscess versus pimple, possible I&D, pain meds, +/- antibiotics and reassess. 62-year-old male with a history of necrotizing autoimmune myopathy, DM, presents with left lower abdomen abscess/furuncle for the past 3 to 4 days.  Patient says lesion occurred after shaving, has been spontaneously draining purulent material at home.  Present due to significant pain, denies fever/chills, nausea/vomiting, diarrhea/constipation, dysuria.  AVSS, erythematous pustule noted in left lower quadrant of abdomen otherwise abdomen nontender and soft.  Fungal versus abscess versus pimple, possible I&D, pain meds, +/- antibiotics and reassess.    pettet attending- see attending attestation for my mdm

## 2023-03-30 NOTE — ED PROVIDER NOTE - PHYSICAL EXAMINATION
GENERAL: well appearing in no acute distress, non-toxic appearing  CARDIAC: regular rate and rhythm, normal S1S2, no appreciable murmurs,   PULM: normal breath sounds, clear to ascultation bilaterally, no rales, rhonchi, wheezing  GI: Abd soft, nondistended, nontender  SKIN: +erythematous pustule, c/w furuncle

## 2023-03-30 NOTE — ED PROVIDER NOTE - OBJECTIVE STATEMENT
Judson Hillman PGY2: Agree with above rapid assessment. Additional Hx: Patient believes the pimple/abscess appeared after shaving his abdomen.  Spontaneously drained over the past 3 to 4 days, however still quite painful.  Denying any fever/chills.

## 2023-03-30 NOTE — ED PROVIDER NOTE - RAPID ASSESSMENT
Private Physician [id number] pcp, and Dr Sousa [facility]  62y male pmh necrotizing autoimmune myopathy, DM, PT comes to ed c/o left ant lower abd wall abscess. Draining past 3-4d. No fever chills. nvd  PE Adult male mild discomfort, Abd wall small draining pustule. left lower quad  Bhargav Pierce MD, Facep     PT seen as Triag/Qdoc.  Full evaluation to be performed once patient is transferred to main ED  Bhargav Pierce MD, Facep

## 2023-03-30 NOTE — ED ADULT NURSE NOTE - OBJECTIVE STATEMENT
61YO male with PMH of DM2, and Myositis via walk in presenting with complaints of abscess. Pt states 4-5 days ago started to have a small lump on his lower left abdomin, states it had a white head, that burst. Pt states having a hx of abscess, states abdomin "lump" has similar presentation. Pt Axox4, respirations even, & non-labored. radial pulses strong and equal bilaterally. Abdomen soft, non-distended, tender around location with redness. Skin warm, dry, with skin tear on lower left medial abdomin. Pt denies chest pain, palpitations, shortness of breath, headache, visual disturbances, numbness/tingling, fever, chills,  nausea, vomiting, or urinary symptoms. Pt placed in position of comfort. Bed in lowest position, wheels locked, appropriate side rails raised. Pt denies needs at this time.

## 2023-03-30 NOTE — ED PROVIDER NOTE - PATIENT PORTAL LINK FT
You can access the FollowMyHealth Patient Portal offered by [facility] by registering at the following website: [email or web address]. By joining [employer]’s FollowMyHealth portal, you will also be able to view your health information using other applications (apps) compatible with our system.

## 2023-03-30 NOTE — ED PROVIDER NOTE - ATTENDING CONTRIBUTION TO CARE
I, Cirilo Hager, performed a history and physical exam of the patient and discussed their management with the resident and/or advanced care provider. I reviewed the resident and/or advanced care provider's note and agree with the documented findings and plan of care. I was present and available for all procedures.    62y male pmh necrotizing autoimmune myopathy, DM, PT comes to ed c/o left ant lower abd wall abscess. Draining past 3-4d. No fever chills. nvd Patient believes the pimple/abscess appeared after shaving his abdomen.  Spontaneously drained over the past 3 to 4 days, however still quite painful.  Denying any fever/chills.    Well appearing and in NAD, head normal appearing atraumatic, trachea midline, no respiratory distress, lungs cta bilaterally, rrr no murmurs, soft NT ND abdomen llq furuncle w surrounding erythema, no visible extremity deformities, Alert and oriented, non focal neuro exam, skin warm and dry, normal affect and mood    furuncle uncomp drained at bedside otherwise dc w abx discussed with patient agreed w plan no systemic s/s

## 2023-03-30 NOTE — ED ADULT NURSE NOTE - CCCP TRG CHIEF CMPLNT
8/18/2022    Yuridia Sanchez  [address] [address]      Dear Yuridia,    There’s no question about it - preventive care can save lives. Many health problems start out silently without symptoms. Preventive care is often the only way to catch these problems in early stages, when they can be more successfully treated.     Our records show that you are due for the screening(s) listed below. If you have completed these screening(s), please call us so we can update your record.    Screening Pap  · Pap Test: Cervical cancer is usually slow growing. It is preventable and it can be cured if found early. The Pap test is the most effective cancer screening test. To assist you in scheduling with a provider, please call [phone].      Your health is important to us. Please feel free to call my office at [phone] or make an appointment to discuss the best screening options for you.     Sincerely,      Lynne Hardin MD   [facility]  [id number] [facility] [address]  Dept: [phone]     Enclosures:      [facility] offers online access to your health information via [email or web address]        abscess

## 2023-04-24 ENCOUNTER — OUTPATIENT (OUTPATIENT)
Dept: OUTPATIENT SERVICES | Facility: HOSPITAL | Age: 63
LOS: 1 days | End: 2023-04-24

## 2023-04-24 ENCOUNTER — APPOINTMENT (OUTPATIENT)
Dept: RHEUMATOLOGY | Facility: CLINIC | Age: 63
End: 2023-04-24
Payer: MEDICAID

## 2023-04-24 VITALS
OXYGEN SATURATION: 99 % | SYSTOLIC BLOOD PRESSURE: 132 MMHG | RESPIRATION RATE: 18 BRPM | HEART RATE: 88 BPM | WEIGHT: 161 LBS | TEMPERATURE: 97.5 F | BODY MASS INDEX: 24.4 KG/M2 | HEIGHT: 68 IN | DIASTOLIC BLOOD PRESSURE: 80 MMHG

## 2023-04-24 DIAGNOSIS — Z98.890 OTHER SPECIFIED POSTPROCEDURAL STATES: Chronic | ICD-10-CM

## 2023-04-24 DIAGNOSIS — J30.2 OTHER SEASONAL ALLERGIC RHINITIS: ICD-10-CM

## 2023-04-24 PROCEDURE — 99214 OFFICE O/P EST MOD 30 MIN: CPT | Mod: GC

## 2023-04-25 NOTE — ASSESSMENT
[FreeTextEntry1] : 62y man with DM, previous short term statin use presents with subacute progressively worsening proximal muscle weakness, dysphagia and 35 lb weight loss since April 2021, + HMGCR Ab  here for necrotizing autoimmune myopathy \par \par #  Immune mediated necrotizing myopathy: \par - positive HMGCR Ab (91), other MSA  - negative \par - Patient was previously on pulse steroid dose and IVIG w/clinical improvement\par - muscle pathology: necrotic and degenerative muscle fibers, mild chronic inflammatory changes \par - LE muscle strength continue to improve 10/10 \par -c/w Vit D,  GI pp \par - self decrease CellCept from 3 g to 1.5 g over the last 3 weeks. CW cellcept 1.5 mg daily  \par - off steroid since beginning of May 2022 \par - off IVIG (started on 10/2019 - 9/2022) due to improvement of symptoms \par \par #Right shoulder pain \par Rotator cuff injury \par US bedside no sign of tear \par improved symptoms \par \par #HCM\par - Flu, PNA vaccines- UTD per pt \par - DEXA - not complete \par - Need to repeat Quantiferon and hepatitis panel next visit    \par ** given positive HMGCR, pt  should NOT be on statin for HLD (pt had remote statin exposure in the past) \par \par Case discussed with Dr. Sousa  \par f/u 2 months\par Bird Engel MD\par PGY 4 Rheumatology fellow\par

## 2023-04-25 NOTE — HISTORY OF PRESENT ILLNESS
[___ Day(s) Ago] : [unfilled] day(s) ago [Necrotizing Myopathy] : necrotizing myopathy [Pulse steroids] : pulse steroids [MTX] : methotrexate [IVIG] : IVIG [Myalgia] : myalgia [Weight loss over 2 kg] : weight loss over 2 kg [Lower extremities weakness] : lower extremities weakness [de-identified] : 4/24/23 [FreeTextEntry1] : continue to feel well, weight gain, muscle strength improve. \par Chair sit-to-stand 14 repetitions in 30 seconds. \par R shoulder pain improved \par \par ROS:\par Positive: R shoulder pain\par Negative: gait issue, SOB, chest pain, rash, weakness, myalgias, fever, N&V  [Cough] : no cough [Shortness of Breath] : no shortness of breath [Upper extremities] : no weakness in upper extremities [Lower extremities] : no weakness in lower extremities [Face] : no weakness in face [Dysphagia] : no dysphagia [Dysphonia] : no dysphonia [TextBox_2] : 9/9/2021 [TextBox_24] : thighb/l diffuse muscle edema and enhancement [TextBox_27] : bx showed sparsely distributed necrotic/regenerating fibers. Very mild chronic inflammatory infiltrates are seen mainly associated with necrotic foci.  [TextBox_50] : HMG-coA reductase antibody

## 2023-04-26 DIAGNOSIS — G72.49 OTHER INFLAMMATORY AND IMMUNE MYOPATHIES, NOT ELSEWHERE CLASSIFIED: ICD-10-CM

## 2023-04-26 DIAGNOSIS — M60.80 OTHER MYOSITIS, UNSPECIFIED SITE: ICD-10-CM

## 2023-04-26 DIAGNOSIS — Z79.899 OTHER LONG TERM (CURRENT) DRUG THERAPY: ICD-10-CM

## 2023-04-27 PROBLEM — J30.2 SEASONAL ALLERGIES: Status: ACTIVE | Noted: 2023-04-27

## 2023-04-27 LAB
ALBUMIN SERPL ELPH-MCNC: 4.9 G/DL
ALP BLD-CCNC: 139 U/L
ALT SERPL-CCNC: 17 U/L
ANION GAP SERPL CALC-SCNC: 16 MMOL/L
AST SERPL-CCNC: 17 U/L
BILIRUB SERPL-MCNC: 0.2 MG/DL
BUN SERPL-MCNC: 17 MG/DL
CALCIUM SERPL-MCNC: 10.2 MG/DL
CHLORIDE SERPL-SCNC: 102 MMOL/L
CK SERPL-CCNC: 568 U/L
CO2 SERPL-SCNC: 23 MMOL/L
CREAT SERPL-MCNC: 0.75 MG/DL
EGFR: 101 ML/MIN/1.73M2
GLUCOSE SERPL-MCNC: 283 MG/DL
HAV IGM SER QL: NONREACTIVE
HBV CORE IGG+IGM SER QL: NONREACTIVE
HBV CORE IGM SER QL: NONREACTIVE
HBV SURFACE AG SER QL: NONREACTIVE
HCV AB SER QL: NONREACTIVE
HCV S/CO RATIO: 0.08 S/CO
M TB IFN-G BLD-IMP: NEGATIVE
POTASSIUM SERPL-SCNC: 5.4 MMOL/L
PROT SERPL-MCNC: 7.6 G/DL
QUANTIFERON TB PLUS MITOGEN MINUS NIL: 7.45 IU/ML
QUANTIFERON TB PLUS NIL: 0.04 IU/ML
QUANTIFERON TB PLUS TB1 MINUS NIL: -0.02 IU/ML
QUANTIFERON TB PLUS TB2 MINUS NIL: -0.02 IU/ML
SODIUM SERPL-SCNC: 141 MMOL/L

## 2023-05-12 ENCOUNTER — APPOINTMENT (OUTPATIENT)
Dept: INTERNAL MEDICINE | Facility: CLINIC | Age: 63
End: 2023-05-12
Payer: MEDICAID

## 2023-05-12 ENCOUNTER — OUTPATIENT (OUTPATIENT)
Dept: OUTPATIENT SERVICES | Facility: HOSPITAL | Age: 63
LOS: 1 days | End: 2023-05-12

## 2023-05-12 VITALS
SYSTOLIC BLOOD PRESSURE: 130 MMHG | WEIGHT: 160 LBS | BODY MASS INDEX: 24.25 KG/M2 | DIASTOLIC BLOOD PRESSURE: 70 MMHG | HEIGHT: 68 IN | HEART RATE: 83 BPM | OXYGEN SATURATION: 97 %

## 2023-05-12 DIAGNOSIS — Z98.890 OTHER SPECIFIED POSTPROCEDURAL STATES: Chronic | ICD-10-CM

## 2023-05-12 DIAGNOSIS — J30.9 ALLERGIC RHINITIS, UNSPECIFIED: ICD-10-CM

## 2023-05-12 LAB — HBA1C MFR BLD HPLC: 10.9

## 2023-05-12 PROCEDURE — 99213 OFFICE O/P EST LOW 20 MIN: CPT | Mod: GE

## 2023-05-12 RX ORDER — FLUTICASONE PROPIONATE 50 UG/1
50 SPRAY, METERED NASAL DAILY
Qty: 1 | Refills: 0 | Status: ACTIVE | COMMUNITY
Start: 2023-05-12 | End: 1900-01-01

## 2023-05-12 RX ORDER — FLASH GLUCOSE SENSOR
KIT MISCELLANEOUS
Qty: 2 | Refills: 5 | Status: ACTIVE | COMMUNITY
Start: 2023-05-12 | End: 1900-01-01

## 2023-05-12 RX ORDER — FLASH GLUCOSE SCANNING READER
EACH MISCELLANEOUS
Qty: 1 | Refills: 0 | Status: ACTIVE | COMMUNITY
Start: 2023-05-12 | End: 1900-01-01

## 2023-05-12 RX ORDER — ALBUTEROL SULFATE 90 UG/1
108 (90 BASE) INHALANT RESPIRATORY (INHALATION)
Qty: 1 | Refills: 3 | Status: ACTIVE | COMMUNITY
Start: 2023-05-12 | End: 1900-01-01

## 2023-05-12 RX ORDER — INSULIN GLARGINE 100 [IU]/ML
100 INJECTION, SOLUTION SUBCUTANEOUS AT BEDTIME
Qty: 1 | Refills: 3 | Status: DISCONTINUED | COMMUNITY
Start: 2022-10-18 | End: 2023-05-12

## 2023-05-13 NOTE — ASSESSMENT
[FreeTextEntry1] : #HCM \par - Patient to bring documentation for Pnemo, Tdap, and Shingrix he received at his prior PCP. Will discuss Prevnar 20 at next visit \par - Flu: 10/22 \par - Colonoscopy (2021): normal \par \par RTC in 5 weeks for diabetes management \par \par Case discussed with Dr. Almaraz\par \par Jordyn Reis\par PGY-2 [employer]

## 2023-05-13 NOTE — HISTORY OF PRESENT ILLNESS
[FreeTextEntry1] : f/u  [de-identified] : 61 Y/O M with HLD, T2DM and necrotizing autoimmune myopathy who presents for follow up for diabetes management. No acute complaints currently. \par \par #T2DM: states he has been compliant with glargine 14 U since has last visit. Does not check FSG regularly. When he checks it, it's usually 270-280. Received steglatro however pt read about side effects with this medication and has not started taking it. Patient denies hypoglycemic symptoms. Pt reports he eats a carb-rich diet and eats a lot of sugary foods. Denies dysuria, abd pain, N/V. 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 He reports mild muscle weakness in B/L LE and hands, but overall improved from last visit. Reports his dysphagia has resolved and is eating and appropriately gaining weight. Pt was referred to lipid clinic however pt cancelled appointment. Able to do physical activity and is doing repetitive activity to improve muscle function. He continues to take cellcept, as prescribed.

## 2023-05-13 NOTE — REVIEW OF SYSTEMS
[Fever] : no fever [Chills] : no chills [Fatigue] : no fatigue [Night Sweats] : no night sweats [Discharge] : no discharge [Pain] : no pain [Vision Problems] : no vision problems [Hearing Loss] : no hearing loss [Sore Throat] : no sore throat [Chest Pain] : no chest pain [Palpitations] : no palpitations [Claudication] : no  leg claudication [Lower Ext Edema] : no lower extremity edema [Shortness Of Breath] : no shortness of breath [Orthopena] : no orthopnea [Wheezing] : no wheezing [Cough] : no cough [Dyspnea on Exertion] : not dyspnea on exertion [Abdominal Pain] : no abdominal pain [Constipation] : no constipation [Nausea] : no nausea [Vomiting] : no vomiting [Heartburn] : no heartburn [Dysuria] : no dysuria [Incontinence] : no incontinence [Hesitancy] : no hesitancy [Muscle Weakness] : no muscle weakness [Hematuria] : no hematuria [Back Pain] : no back pain [Joint Swelling] : no joint swelling [Itching] : no itching [Skin Rash] : no skin rash [Dizziness] : no dizziness [Headache] : no headache [Fainting] : no fainting [Memory Loss] : no memory loss [Easy Bleeding] : no easy bleeding [Easy Bruising] : no easy bruising [Swollen Glands] : no swollen glands

## 2023-05-13 NOTE — PHYSICAL EXAM
[No Acute Distress] : no acute distress [Well Nourished] : well nourished [Well Developed] : well developed [Normal Sclera/Conjunctiva] : normal sclera/conjunctiva [EOMI] : extraocular movements intact [Normal TMs] : both tympanic membranes were normal [No JVD] : no jugular venous distention [No Lymphadenopathy] : no lymphadenopathy [Thyroid Normal, No Nodules] : the thyroid was normal and there were no nodules present [No Respiratory Distress] : no respiratory distress  [No Accessory Muscle Use] : no accessory muscle use [Clear to Auscultation] : lungs were clear to auscultation bilaterally [Normal Rate] : normal rate  [Regular Rhythm] : with a regular rhythm [Normal S1, S2] : normal S1 and S2 [Pedal Pulses Present] : the pedal pulses are present [No Edema] : there was no peripheral edema [Soft] : abdomen soft [Non Tender] : non-tender [Non-distended] : non-distended [Normal Bowel Sounds] : normal bowel sounds [Grossly Normal Strength/Tone] : grossly normal strength/tone [Coordination Grossly Intact] : coordination grossly intact [No Focal Deficits] : no focal deficits [Normal Gait] : normal gait [Speech Grossly Normal] : speech grossly normal [Normal Affect] : the affect was normal [Normal Mood] : the mood was normal [Normal Insight/Judgement] : insight and judgment were intact [de-identified] : shoulder strength 5/5; hip strength 5/5.  strength 5/5, Distal muscle strength 5/5; sensation intact

## 2023-05-16 DIAGNOSIS — E78.5 HYPERLIPIDEMIA, UNSPECIFIED: ICD-10-CM

## 2023-05-16 DIAGNOSIS — G72.49 OTHER INFLAMMATORY AND IMMUNE MYOPATHIES, NOT ELSEWHERE CLASSIFIED: ICD-10-CM

## 2023-05-16 DIAGNOSIS — E11.9 TYPE 2 DIABETES MELLITUS WITHOUT COMPLICATIONS: ICD-10-CM

## 2023-05-16 DIAGNOSIS — J30.9 ALLERGIC RHINITIS, UNSPECIFIED: ICD-10-CM

## 2023-05-24 RX ORDER — FLASH GLUCOSE SCANNING READER
EACH MISCELLANEOUS
Qty: 1 | Refills: 2 | Status: ACTIVE | COMMUNITY
Start: 2023-05-24 | End: 1900-01-01

## 2023-05-30 ENCOUNTER — RX RENEWAL (OUTPATIENT)
Age: 63
End: 2023-05-30

## 2023-06-16 ENCOUNTER — OUTPATIENT (OUTPATIENT)
Dept: OUTPATIENT SERVICES | Facility: HOSPITAL | Age: 63
LOS: 1 days | End: 2023-06-16

## 2023-06-16 ENCOUNTER — APPOINTMENT (OUTPATIENT)
Dept: INTERNAL MEDICINE | Facility: CLINIC | Age: 63
End: 2023-06-16

## 2023-06-16 ENCOUNTER — APPOINTMENT (OUTPATIENT)
Dept: INTERNAL MEDICINE | Facility: CLINIC | Age: 63
End: 2023-06-16
Payer: MEDICAID

## 2023-06-16 VITALS — HEART RATE: 74 BPM | WEIGHT: 159 LBS | BODY MASS INDEX: 24.1 KG/M2 | OXYGEN SATURATION: 97 % | HEIGHT: 68 IN

## 2023-06-16 VITALS — SYSTOLIC BLOOD PRESSURE: 146 MMHG | DIASTOLIC BLOOD PRESSURE: 76 MMHG

## 2023-06-16 DIAGNOSIS — Z98.890 OTHER SPECIFIED POSTPROCEDURAL STATES: Chronic | ICD-10-CM

## 2023-06-16 LAB — GLUCOSE BLDC GLUCOMTR-MCNC: 192

## 2023-06-16 PROCEDURE — 99214 OFFICE O/P EST MOD 30 MIN: CPT

## 2023-06-16 RX ORDER — DICLOFENAC SODIUM 1% 10 MG/G
1 GEL TOPICAL DAILY
Qty: 100 | Refills: 2 | Status: DISCONTINUED | COMMUNITY
Start: 2022-11-20 | End: 2023-06-16

## 2023-06-16 RX ORDER — BLOOD-GLUCOSE METER
KIT MISCELLANEOUS
Qty: 1 | Refills: 0 | Status: ACTIVE | COMMUNITY
Start: 2023-06-16 | End: 1900-01-01

## 2023-06-16 NOTE — REVIEW OF SYSTEMS
[Muscle Weakness] : muscle weakness [Fever] : no fever [Chills] : no chills [Fatigue] : no fatigue [Night Sweats] : no night sweats [Discharge] : no discharge [Pain] : no pain [Vision Problems] : no vision problems [Hearing Loss] : no hearing loss [Sore Throat] : no sore throat [Chest Pain] : no chest pain [Claudication] : no  leg claudication [Palpitations] : no palpitations [Lower Ext Edema] : no lower extremity edema [Orthopena] : no orthopnea [Shortness Of Breath] : no shortness of breath [Wheezing] : no wheezing [Cough] : no cough [Abdominal Pain] : no abdominal pain [Dyspnea on Exertion] : not dyspnea on exertion [Nausea] : no nausea [Constipation] : no constipation [Vomiting] : no vomiting [Heartburn] : no heartburn [Dysuria] : no dysuria [Incontinence] : no incontinence [Hesitancy] : no hesitancy [Hematuria] : no hematuria [Back Pain] : no back pain [Joint Swelling] : no joint swelling [Itching] : no itching [Skin Rash] : no skin rash [Headache] : no headache [Dizziness] : no dizziness [Fainting] : no fainting [Memory Loss] : no memory loss [Easy Bleeding] : no easy bleeding [Easy Bruising] : no easy bruising [Swollen Glands] : no swollen glands

## 2023-06-16 NOTE — ASSESSMENT
[FreeTextEntry1] : #HCM \par - Patient to bring documentation for Pnemo, Tdap, and Shingrix he received at his prior PCP. Will discuss Prevnar 20 at next visit \par - Flu: 10/22 \par - Colonoscopy (2021): normal \par \par RTC in 2 months for a1c check, dm management \par \par Case discussed with Dr. Browne\par \par Jordyn Reis\par PGY-2 [employer].

## 2023-06-16 NOTE — HISTORY OF PRESENT ILLNESS
[FreeTextEntry1] : diabetes f/u  [de-identified] : 61 Y/O M with HLD, T2DM and necrotizing autoimmune myopathy who presents for follow up for diabetes management. No acute complaints currently. \par \par #T2DM: states he has been compliant with glargine 20 U since has last visit. Does not check FSG regularly. When he checks it, it's usually in the 140's range as compared to his previous visits when his FSG was in the 200's. Previously was not taking steglatro however reports that he has been compliant with it since the last visit. Patient denies hypoglycemic symptoms. Since his last visit, he has cut down significantly on the surgary food intake and is mostly eating salads, salmon, chicken. Has been going to the gym 3x a week regularly and usually exercises ~30 mins-1 hour but notes that he mainly does leg workouts. Encouraged to do cardio as well. Denies dysuria, abd pain, N/V. 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 He reports mild muscle weakness in B/L LE and hands, but overall improved from last visit. Reports his dysphagia has resolved and is eating and appropriately gaining weight. Pt was referred to lipid clinic however pt cancelled appointment. Able to do physical activity and is doing repetitive activity to improve muscle function. He continues to take cellcept, as prescribed.

## 2023-06-16 NOTE — PHYSICAL EXAM
[No Acute Distress] : no acute distress [Well Nourished] : well nourished [Well Developed] : well developed [Normal Sclera/Conjunctiva] : normal sclera/conjunctiva [EOMI] : extraocular movements intact [Normal TMs] : both tympanic membranes were normal [No JVD] : no jugular venous distention [No Lymphadenopathy] : no lymphadenopathy [Thyroid Normal, No Nodules] : the thyroid was normal and there were no nodules present [No Respiratory Distress] : no respiratory distress  [No Accessory Muscle Use] : no accessory muscle use [Clear to Auscultation] : lungs were clear to auscultation bilaterally [Normal Rate] : normal rate  [Regular Rhythm] : with a regular rhythm [Normal S1, S2] : normal S1 and S2 [Pedal Pulses Present] : the pedal pulses are present [No Edema] : there was no peripheral edema [Soft] : abdomen soft [Non Tender] : non-tender [Non-distended] : non-distended [Normal Bowel Sounds] : normal bowel sounds [Grossly Normal Strength/Tone] : grossly normal strength/tone [Coordination Grossly Intact] : coordination grossly intact [No Focal Deficits] : no focal deficits [Normal Gait] : normal gait [Speech Grossly Normal] : speech grossly normal [Normal Affect] : the affect was normal [Normal Mood] : the mood was normal [Normal Insight/Judgement] : insight and judgment were intact [de-identified] : shoulder strength 5/5; hip strength 5/5.  strength 5/5, Distal muscle strength 5/5; sensation intact

## 2023-06-20 DIAGNOSIS — E11.9 TYPE 2 DIABETES MELLITUS WITHOUT COMPLICATIONS: ICD-10-CM

## 2023-07-24 ENCOUNTER — OUTPATIENT (OUTPATIENT)
Dept: OUTPATIENT SERVICES | Facility: HOSPITAL | Age: 63
LOS: 1 days | End: 2023-07-24

## 2023-07-24 ENCOUNTER — APPOINTMENT (OUTPATIENT)
Dept: RHEUMATOLOGY | Facility: CLINIC | Age: 63
End: 2023-07-24
Payer: MEDICAID

## 2023-07-24 ENCOUNTER — APPOINTMENT (OUTPATIENT)
Dept: INTERNAL MEDICINE | Facility: CLINIC | Age: 63
End: 2023-07-24

## 2023-07-24 VITALS
RESPIRATION RATE: 16 BRPM | OXYGEN SATURATION: 100 % | TEMPERATURE: 97.5 F | WEIGHT: 163 LBS | BODY MASS INDEX: 24.71 KG/M2 | HEART RATE: 66 BPM | DIASTOLIC BLOOD PRESSURE: 75 MMHG | HEIGHT: 68 IN | SYSTOLIC BLOOD PRESSURE: 134 MMHG

## 2023-07-24 DIAGNOSIS — K21.9 GASTRO-ESOPHAGEAL REFLUX DISEASE W/OUT ESOPHAGITIS: ICD-10-CM

## 2023-07-24 DIAGNOSIS — Z98.890 OTHER SPECIFIED POSTPROCEDURAL STATES: Chronic | ICD-10-CM

## 2023-07-24 DIAGNOSIS — G72.49 OTHER INFLAMMATORY AND IMMUNE MYOPATHIES, NOT ELSEWHERE CLASSIFIED: ICD-10-CM

## 2023-07-24 DIAGNOSIS — M62.81 MUSCLE WEAKNESS (GENERALIZED): ICD-10-CM

## 2023-07-24 PROCEDURE — 99214 OFFICE O/P EST MOD 30 MIN: CPT | Mod: GC

## 2023-07-24 NOTE — BRIEF OPERATIVE NOTE - NSICDXBRIEFPROCEDURE_GEN_ALL_CORE_FT
Left message on patient's voicemail to call back for results. PROCEDURES:  Muscle biopsy, deep 09-Sep-2021 13:28:53  Brown Douglas

## 2023-07-25 DIAGNOSIS — G72.49 OTHER INFLAMMATORY AND IMMUNE MYOPATHIES, NOT ELSEWHERE CLASSIFIED: ICD-10-CM

## 2023-07-25 DIAGNOSIS — M62.81 MUSCLE WEAKNESS (GENERALIZED): ICD-10-CM

## 2023-07-25 DIAGNOSIS — Z79.899 OTHER LONG TERM (CURRENT) DRUG THERAPY: ICD-10-CM

## 2023-07-25 DIAGNOSIS — K21.9 GASTRO-ESOPHAGEAL REFLUX DISEASE WITHOUT ESOPHAGITIS: ICD-10-CM

## 2023-07-25 DIAGNOSIS — G72.89 OTHER SPECIFIED MYOPATHIES: ICD-10-CM

## 2023-07-25 DIAGNOSIS — M25.511 PAIN IN RIGHT SHOULDER: ICD-10-CM

## 2023-07-25 DIAGNOSIS — E11.9 TYPE 2 DIABETES MELLITUS WITHOUT COMPLICATIONS: ICD-10-CM

## 2023-07-25 NOTE — HISTORY OF PRESENT ILLNESS
[___ Month(s) Ago] : [unfilled] month(s) ago [Necrotizing Myopathy] : necrotizing myopathy [Pulse steroids] : pulse steroids [MTX] : methotrexate [IVIG] : IVIG [Myalgia] : myalgia [Weight loss over 2 kg] : weight loss over 2 kg [Lower extremities weakness] : lower extremities weakness [FreeTextEntry1] : 7/24/23\par Continues to feel well, feels that his muscle strength is improving. Has been lifting weights at the gym 3x a week. Chair sit-to-stand 15 repetitions in 30 seconds. Reports that the R shoulder pain from last visit is much improved. \par \par ROS:\par Negative: gait issue, SOB, chest pain, rash, weakness, myalgias, fever, N&V  [Cough] : no cough [Shortness of Breath] : no shortness of breath [Upper extremities] : no weakness in upper extremities [Lower extremities] : no weakness in lower extremities [Face] : no weakness in face [Dysphagia] : no dysphagia [Dysphonia] : no dysphonia [TextBox_2] : 9/9/2021 [TextBox_27] : bx showed sparsely distributed necrotic/regenerating fibers. Very mild chronic inflammatory infiltrates are seen mainly associated with necrotic foci.  [TextBox_24] : thigh b/l diffuse muscle edema and enhancement [TextBox_43] : j [TextBox_50] : HMG-coA reductase antibody

## 2023-07-25 NOTE — PHYSICAL EXAM
[General Appearance - Alert] : alert [General Appearance - In No Acute Distress] : in no acute distress [General Appearance - Well Nourished] : well nourished [Sclera] : the sclera and conjunctiva were normal [Extraocular Movements] : extraocular movements were intact [Outer Ear] : the ears and nose were normal in appearance [Neck Appearance] : the appearance of the neck was normal [Exaggerated Use Of Accessory Muscles For Inspiration] : no accessory muscle use [Auscultation Breath Sounds / Voice Sounds] : lungs were clear to auscultation bilaterally [Heart Rate And Rhythm] : heart rate was normal and rhythm regular [Heart Sounds] : normal S1 and S2 [Edema] : there was no peripheral edema [Abnormal Walk] : normal gait [Nail Clubbing] : no clubbing  or cyanosis of the fingernails [Musculoskeletal - Swelling] : no joint swelling seen [] : no rash [No Focal Deficits] : no focal deficits [Oriented To Time, Place, And Person] : oriented to person, place, and time [FreeTextEntry1] : strength: 5/5 proximal UE b/l, 5/5 distal UE b/l, LE: 4+/5 quads strength b/l, 5/5 knee flexion and extension, 5/5 dorsiflexion/plantar flexion, able to get up from chair without assistance (15 sets in 30 sec) b/l shoulder ROM relatively intact, empty can negative, some pain in internal rotation of R shoulder

## 2023-07-25 NOTE — ASSESSMENT
[FreeTextEntry1] : 62y man with DM, previous short term statin use presents with subacute progressively worsening proximal muscle weakness, dysphagia and 35 lb weight loss since April 2021, +HMGCR Ab  here for necrotizing autoimmune myopathy \par \par # Immune mediated necrotizing myopathy: \par - positive HMGCR Ab (91), other MSA - negative \par - Patient was previously on pulse steroid dose and IVIG w/clinical improvement\par - muscle pathology: necrotic and degenerative muscle fibers, mild chronic inflammatory changes \par - LE muscle strength 5/5 except for 4+/5 quads b/l \par - c/w Vit D \par - CW cellcept 1.5 mg daily  \par - off steroid since beginning of May 2022 \par - off IVIG (started on 10/2019 - 9/2022) due to improvement of symptoms \par - CK noted to rise last visit 4/2023 568 (increased from 350), will repeat labs today \par - Offered PT to patient, but refusing at this time \par \par #HTN\par - /75, noted to have systolic >140s on multiple prior visits\par - Denies a history of HTN or ever being on antihypertensives\par - Has Medicine f/u appt next month, continue to monitor and consider starting a medication  \par \par #HCM\par - Flu, PNA vaccines- UTD per pt \par - DEXA - referral given \par - Quantiferon and hepatitis panel 4/2023 negative \par ** given positive HMGCR, pt  should NOT be on statin for HLD (pt had remote statin exposure in the past) \par \par Case discussed with Dr. Gibson \par \par f/u 3 months\par \par Payton Espinal MD\par Rheumatology Fellow \par

## 2023-07-27 ENCOUNTER — NON-APPOINTMENT (OUTPATIENT)
Age: 63
End: 2023-07-27

## 2023-07-27 LAB
ALBUMIN SERPL ELPH-MCNC: 4.9 G/DL
ALP BLD-CCNC: 115 U/L
ALT SERPL-CCNC: 20 U/L
ANION GAP SERPL CALC-SCNC: 11 MMOL/L
AST SERPL-CCNC: 18 U/L
BILIRUB SERPL-MCNC: 0.2 MG/DL
BUN SERPL-MCNC: 19 MG/DL
CALCIUM SERPL-MCNC: 9.9 MG/DL
CHLORIDE SERPL-SCNC: 104 MMOL/L
CK SERPL-CCNC: 492 U/L
CO2 SERPL-SCNC: 26 MMOL/L
CREAT SERPL-MCNC: 0.95 MG/DL
EGFR: 89 ML/MIN/1.73M2
GLUCOSE SERPL-MCNC: 164 MG/DL
POTASSIUM SERPL-SCNC: 4.7 MMOL/L
PROT SERPL-MCNC: 7.9 G/DL
SODIUM SERPL-SCNC: 140 MMOL/L

## 2023-08-21 ENCOUNTER — APPOINTMENT (OUTPATIENT)
Dept: INTERNAL MEDICINE | Facility: CLINIC | Age: 63
End: 2023-08-21

## 2023-08-21 ENCOUNTER — RESULT CHARGE (OUTPATIENT)
Age: 63
End: 2023-08-21

## 2023-08-21 ENCOUNTER — OUTPATIENT (OUTPATIENT)
Dept: OUTPATIENT SERVICES | Facility: HOSPITAL | Age: 63
LOS: 1 days | End: 2023-08-21

## 2023-08-21 ENCOUNTER — APPOINTMENT (OUTPATIENT)
Dept: INTERNAL MEDICINE | Facility: CLINIC | Age: 63
End: 2023-08-21
Payer: MEDICAID

## 2023-08-21 VITALS
BODY MASS INDEX: 24.13 KG/M2 | HEART RATE: 67 BPM | RESPIRATION RATE: 16 BRPM | DIASTOLIC BLOOD PRESSURE: 75 MMHG | TEMPERATURE: 97.3 F | OXYGEN SATURATION: 98 % | HEIGHT: 68 IN | SYSTOLIC BLOOD PRESSURE: 141 MMHG | WEIGHT: 159.25 LBS

## 2023-08-21 DIAGNOSIS — Z98.890 OTHER SPECIFIED POSTPROCEDURAL STATES: Chronic | ICD-10-CM

## 2023-08-21 PROCEDURE — 99213 OFFICE O/P EST LOW 20 MIN: CPT | Mod: GE

## 2023-08-22 LAB — HBA1C MFR BLD HPLC: 8.9

## 2023-08-22 NOTE — HISTORY OF PRESENT ILLNESS
[FreeTextEntry1] : T2DM f/u  [de-identified] : 62 Y/O M with HLD, T2DM and necrotizing autoimmune myopathy who presents for follow up for diabetes. Feels well. No acute complaints.   #T2DM: states he has been mostly compliant with glargine 20 U since his last visit. Was taught freestyle elyse at last visit and reports that he checked his FSG for 2 weeks; fasting ranging from 120-140 and premeals usually ~200. Intermittently misses doses of steglatro although overall he has been compliant. Patient denies hypoglycemic symptoms. Reports his eating habits vary per week and at times he is able to eat mostly protein, vegetables however not consistently and then resorts to high carb diet. Has been going to the gym 3x a week regularly and usually exercises ~30 mins-1 hour. Denies dysuria, abd pain, N/V.  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 Muscle symptoms greatly improved. Pt was referred to lipid clinic however pt cancelled appointment to start anti-cholesterol meds. Able to do physical activity and is doing repetitive activity to improve muscle function. He continues to take cellcept as prescribed.

## 2023-08-22 NOTE — PHYSICAL EXAM
[No Acute Distress] : no acute distress [Well Nourished] : well nourished [Well Developed] : well developed [Normal Sclera/Conjunctiva] : normal sclera/conjunctiva [EOMI] : extraocular movements intact [Normal TMs] : both tympanic membranes were normal [No JVD] : no jugular venous distention [No Lymphadenopathy] : no lymphadenopathy [Thyroid Normal, No Nodules] : the thyroid was normal and there were no nodules present [No Respiratory Distress] : no respiratory distress  [No Accessory Muscle Use] : no accessory muscle use [Clear to Auscultation] : lungs were clear to auscultation bilaterally [Normal Rate] : normal rate  [Regular Rhythm] : with a regular rhythm [Normal S1, S2] : normal S1 and S2 [Pedal Pulses Present] : the pedal pulses are present [No Edema] : there was no peripheral edema [Soft] : abdomen soft [Non Tender] : non-tender [Non-distended] : non-distended [Normal Bowel Sounds] : normal bowel sounds [Grossly Normal Strength/Tone] : grossly normal strength/tone [Coordination Grossly Intact] : coordination grossly intact [No Focal Deficits] : no focal deficits [Normal Gait] : normal gait [Speech Grossly Normal] : speech grossly normal [Normal Affect] : the affect was normal [Normal Mood] : the mood was normal [Normal Insight/Judgement] : insight and judgment were intact [de-identified] : shoulder strength 5/5; hip strength 5/5.  strength 5/5, Distal muscle strength 5/5; sensation intact

## 2023-08-22 NOTE — ASSESSMENT
[FreeTextEntry1] : #HCM - Prevnar 20: ordered today  -  Tdap, and Shingrix: states he received these at his prior PCP office. Advised to bring in docs at next visit - Colonoscopy (2021): normal  RTC in 3 months for a1c check, DM management  Case discussed with Dr. Dora Reis PGY-3 [employer] [facility]

## 2023-08-22 NOTE — REVIEW OF SYSTEMS
[Muscle Weakness] : muscle weakness [Fever] : no fever [Chills] : no chills [Fatigue] : no fatigue [Night Sweats] : no night sweats [Discharge] : no discharge [Pain] : no pain [Vision Problems] : no vision problems [Hearing Loss] : no hearing loss [Sore Throat] : no sore throat [Chest Pain] : no chest pain [Palpitations] : no palpitations [Claudication] : no  leg claudication [Lower Ext Edema] : no lower extremity edema [Orthopena] : no orthopnea [Shortness Of Breath] : no shortness of breath [Wheezing] : no wheezing [Cough] : no cough [Dyspnea on Exertion] : not dyspnea on exertion [Abdominal Pain] : no abdominal pain [Nausea] : no nausea [Constipation] : no constipation [Vomiting] : no vomiting [Heartburn] : no heartburn [Dysuria] : no dysuria [Incontinence] : no incontinence [Hesitancy] : no hesitancy [Hematuria] : no hematuria [Back Pain] : no back pain [Joint Swelling] : no joint swelling [Itching] : no itching [Skin Rash] : no skin rash [Headache] : no headache [Dizziness] : no dizziness [Fainting] : no fainting [Memory Loss] : no memory loss [Easy Bleeding] : no easy bleeding [Easy Bruising] : no easy bruising [Swollen Glands] : no swollen glands

## 2023-08-22 NOTE — END OF VISIT
[] : Resident [FreeTextEntry3] : DM2 -A1C improved from 10.9->8.9 at this visit compared with May.  -Continue to encourage medication compliance and lifestyle modifications. Pt may benefit from premeal insulin if amenable in the future given persistently elevated premeal sugars.   HTN/proteinuria -Recommended starting lisinopril, however pt does not want to start at this time. Continue to encourage if BPs persistently elevated despite lifestyle modifications.

## 2023-08-23 DIAGNOSIS — I10 ESSENTIAL (PRIMARY) HYPERTENSION: ICD-10-CM

## 2023-08-23 DIAGNOSIS — E11.9 TYPE 2 DIABETES MELLITUS WITHOUT COMPLICATIONS: ICD-10-CM

## 2023-08-23 DIAGNOSIS — E78.5 HYPERLIPIDEMIA, UNSPECIFIED: ICD-10-CM

## 2023-09-11 NOTE — PROGRESS NOTE ADULT - TIME-BASED
His diagnoses are:  1. Nonischemic cardiomyopathy ([address])    2. Chronic systolic congestive heart failure ([address])    3. Longstanding persistent atrial fibrillation ([address])    4. Complete heart block ([address])    5. Presence of permanent cardiac pacemaker    6.  Muscular dystrophy ([address])      DEB Rain - CNP
Please advise
Pt called stated he seen npdd on 09/01 and was supposed to start entresto, pt stated when he went to the pharmacy to  medication they stated they needed a call from medical staff to gather more information on why this medication is medically necessary for pt.
Spoke with patient about his Josh Samples, he is going to call his pharmacy today and will call us back. May need to do patient assistance if he cannot get it filled.
Spoke with patient and he said that the pharmacy will not give him the [facility] because he needs to have a diagnosis of heart failure to give it to him.  Please advise
This medication is necessary for his severe cardiomyopathy. This was started during his hospitalization and ordered upon discharge. He should be taking this as well as the Toprol XL and Eliquis.    DEB Rain - CNP
35
30

## 2023-09-18 ENCOUNTER — RX RENEWAL (OUTPATIENT)
Age: 63
End: 2023-09-18

## 2023-10-23 ENCOUNTER — NON-APPOINTMENT (OUTPATIENT)
Age: 63
End: 2023-10-23

## 2023-10-23 ENCOUNTER — APPOINTMENT (OUTPATIENT)
Dept: RHEUMATOLOGY | Facility: CLINIC | Age: 63
End: 2023-10-23
Payer: MEDICAID

## 2023-10-23 ENCOUNTER — OUTPATIENT (OUTPATIENT)
Dept: OUTPATIENT SERVICES | Facility: HOSPITAL | Age: 63
LOS: 1 days | End: 2023-10-23

## 2023-10-23 ENCOUNTER — MED ADMIN CHARGE (OUTPATIENT)
Age: 63
End: 2023-10-23

## 2023-10-23 VITALS
BODY MASS INDEX: 23.95 KG/M2 | HEIGHT: 68 IN | OXYGEN SATURATION: 97 % | SYSTOLIC BLOOD PRESSURE: 136 MMHG | HEART RATE: 70 BPM | WEIGHT: 158 LBS | RESPIRATION RATE: 16 BRPM | DIASTOLIC BLOOD PRESSURE: 82 MMHG

## 2023-10-23 DIAGNOSIS — Z79.899 OTHER LONG TERM (CURRENT) DRUG THERAPY: ICD-10-CM

## 2023-10-23 DIAGNOSIS — Z00.00 ENCOUNTER FOR GENERAL ADULT MEDICAL EXAMINATION W/OUT ABNORMAL FINDINGS: ICD-10-CM

## 2023-10-23 DIAGNOSIS — Z98.890 OTHER SPECIFIED POSTPROCEDURAL STATES: Chronic | ICD-10-CM

## 2023-10-23 PROCEDURE — 99214 OFFICE O/P EST MOD 30 MIN: CPT | Mod: GC

## 2023-10-25 DIAGNOSIS — G72.89 OTHER SPECIFIED MYOPATHIES: ICD-10-CM

## 2023-10-25 DIAGNOSIS — Z79.899 OTHER LONG TERM (CURRENT) DRUG THERAPY: ICD-10-CM

## 2023-10-25 PROBLEM — Z00.00 ENCOUNTER FOR PREVENTIVE HEALTH EXAMINATION: Status: ACTIVE | Noted: 2021-09-08

## 2023-10-30 NOTE — PHYSICAL THERAPY INITIAL EVALUATION ADULT - LEVEL OF INDEPENDENCE: SIT/STAND, REHAB EVAL
Airway       Patient location during procedure: OR  Staff -        CRNA: Celi Robison APRN CRNA       Performed By: CRNA  Consent for Airway        Urgency: elective  Indications and Patient Condition       Indications for airway management: obed-procedural       Induction type:intravenous       Mask difficulty assessment: 1 - vent by mask    Final Airway Details       Final airway type: supraglottic airway    Supraglottic Airway Details        Type: LMA       Brand: I-Gel       LMA size: 4    Post intubation assessment        Placement verified by: capnometry, equal breath sounds and chest rise        Number of attempts at approach: 1       Number of other approaches attempted: 0       Secured with: silk tape       Ease of procedure: easy       Dentition: Intact and Unchanged         independent

## 2023-11-01 ENCOUNTER — OUTPATIENT (OUTPATIENT)
Dept: OUTPATIENT SERVICES | Facility: HOSPITAL | Age: 63
LOS: 1 days | End: 2023-11-01

## 2023-11-01 ENCOUNTER — APPOINTMENT (OUTPATIENT)
Dept: INTERNAL MEDICINE | Facility: CLINIC | Age: 63
End: 2023-11-01
Payer: MEDICAID

## 2023-11-01 VITALS
BODY MASS INDEX: 24.86 KG/M2 | WEIGHT: 164 LBS | HEART RATE: 67 BPM | SYSTOLIC BLOOD PRESSURE: 150 MMHG | DIASTOLIC BLOOD PRESSURE: 80 MMHG | OXYGEN SATURATION: 98 % | HEIGHT: 68 IN

## 2023-11-01 VITALS — DIASTOLIC BLOOD PRESSURE: 80 MMHG | SYSTOLIC BLOOD PRESSURE: 120 MMHG

## 2023-11-01 DIAGNOSIS — Z98.890 OTHER SPECIFIED POSTPROCEDURAL STATES: Chronic | ICD-10-CM

## 2023-11-01 PROCEDURE — 99213 OFFICE O/P EST LOW 20 MIN: CPT | Mod: GE

## 2023-11-01 RX ORDER — PANTOPRAZOLE 40 MG/1
40 TABLET, DELAYED RELEASE ORAL DAILY
Qty: 90 | Refills: 1 | Status: DISCONTINUED | COMMUNITY
Start: 2021-09-27 | End: 2023-11-01

## 2023-11-01 RX ORDER — ALBUTEROL SULFATE 90 UG/1
108 (90 BASE) AEROSOL, METERED RESPIRATORY (INHALATION)
Qty: 1 | Refills: 3 | Status: DISCONTINUED | COMMUNITY
Start: 2023-09-18 | End: 2023-11-01

## 2023-11-01 RX ORDER — ERTUGLIFLOZIN 15 MG/1
15 TABLET, FILM COATED ORAL
Qty: 90 | Refills: 1 | Status: ACTIVE | COMMUNITY
Start: 2022-10-14 | End: 1900-01-01

## 2023-11-02 ENCOUNTER — NON-APPOINTMENT (OUTPATIENT)
Age: 63
End: 2023-11-02

## 2023-11-02 LAB
ALBUMIN SERPL ELPH-MCNC: 4.7 G/DL
ALP BLD-CCNC: 114 U/L
ALT SERPL-CCNC: 17 U/L
ANION GAP SERPL CALC-SCNC: 12 MMOL/L
AST SERPL-CCNC: 18 U/L
BASOPHILS # BLD AUTO: 0.02 K/UL
BASOPHILS NFR BLD AUTO: 0.3 %
BILIRUB SERPL-MCNC: 0.3 MG/DL
BUN SERPL-MCNC: 20 MG/DL
CALCIUM SERPL-MCNC: 9.6 MG/DL
CHLORIDE SERPL-SCNC: 104 MMOL/L
CK SERPL-CCNC: 412 U/L
CO2 SERPL-SCNC: 25 MMOL/L
CREAT SERPL-MCNC: 0.77 MG/DL
CREAT SPEC-SCNC: 66 MG/DL
EGFR: 99 ML/MIN/1.73M2
EOSINOPHIL # BLD AUTO: 0.1 K/UL
EOSINOPHIL NFR BLD AUTO: 1.6 %
ESTIMATED AVERAGE GLUCOSE: 223 MG/DL
GLUCOSE SERPL-MCNC: 111 MG/DL
HBA1C MFR BLD HPLC: 9.4 %
HCT VFR BLD CALC: 45.8 %
HGB BLD-MCNC: 13.7 G/DL
IMM GRANULOCYTES NFR BLD AUTO: 0.2 %
LYMPHOCYTES # BLD AUTO: 2.75 K/UL
LYMPHOCYTES NFR BLD AUTO: 44.6 %
MAN DIFF?: NORMAL
MCHC RBC-ENTMCNC: 26.9 PG
MCHC RBC-ENTMCNC: 29.9 GM/DL
MCV RBC AUTO: 90 FL
MICROALBUMIN 24H UR DL<=1MG/L-MCNC: <1.2 MG/DL
MICROALBUMIN/CREAT 24H UR-RTO: NORMAL MG/G
MONOCYTES # BLD AUTO: 0.41 K/UL
MONOCYTES NFR BLD AUTO: 6.6 %
NEUTROPHILS # BLD AUTO: 2.88 K/UL
NEUTROPHILS NFR BLD AUTO: 46.7 %
PLATELET # BLD AUTO: 164 K/UL
POTASSIUM SERPL-SCNC: 4.9 MMOL/L
PROT SERPL-MCNC: 7.4 G/DL
RBC # BLD: 5.09 M/UL
RBC # FLD: 12.3 %
SODIUM SERPL-SCNC: 141 MMOL/L
WBC # FLD AUTO: 6.17 K/UL

## 2023-11-06 ENCOUNTER — NON-APPOINTMENT (OUTPATIENT)
Age: 63
End: 2023-11-06

## 2023-11-07 LAB — HMGCR ANTIBODY, IGG: 117 UNITS

## 2023-11-10 RX ORDER — PEN NEEDLE, DIABETIC 29 G X1/2"
31G X 8 MM NEEDLE, DISPOSABLE MISCELLANEOUS
Qty: 3 | Refills: 3 | Status: ACTIVE | COMMUNITY
Start: 2021-12-08 | End: 1900-01-01

## 2023-11-10 RX ORDER — MYCOPHENOLATE MOFETIL 500 MG/1
500 TABLET ORAL
Qty: 270 | Refills: 0 | Status: ACTIVE | COMMUNITY
Start: 2021-09-15 | End: 1900-01-01

## 2023-11-15 DIAGNOSIS — E78.5 HYPERLIPIDEMIA, UNSPECIFIED: ICD-10-CM

## 2023-11-15 DIAGNOSIS — I10 ESSENTIAL (PRIMARY) HYPERTENSION: ICD-10-CM

## 2023-11-15 DIAGNOSIS — E11.9 TYPE 2 DIABETES MELLITUS WITHOUT COMPLICATIONS: ICD-10-CM

## 2024-01-01 NOTE — DISCHARGE NOTE NURSING/CASE MANAGEMENT/SOCIAL WORK - NSTOBACCONEVERSMOKERY/N_GEN_A
Patient discharged to home in stable condition with mom and dad. Bands verified with mom and dad.   No

## 2024-01-02 ENCOUNTER — RX RENEWAL (OUTPATIENT)
Age: 64
End: 2024-01-02

## 2024-01-22 ENCOUNTER — APPOINTMENT (OUTPATIENT)
Dept: RHEUMATOLOGY | Facility: CLINIC | Age: 64
End: 2024-01-22

## 2024-02-16 ENCOUNTER — OUTPATIENT (OUTPATIENT)
Dept: OUTPATIENT SERVICES | Facility: HOSPITAL | Age: 64
LOS: 1 days | End: 2024-02-16

## 2024-02-16 ENCOUNTER — APPOINTMENT (OUTPATIENT)
Dept: INTERNAL MEDICINE | Facility: CLINIC | Age: 64
End: 2024-02-16
Payer: MEDICAID

## 2024-02-16 ENCOUNTER — APPOINTMENT (OUTPATIENT)
Dept: INTERNAL MEDICINE | Facility: CLINIC | Age: 64
End: 2024-02-16

## 2024-02-16 VITALS
RESPIRATION RATE: 16 BRPM | HEIGHT: 68 IN | BODY MASS INDEX: 25.16 KG/M2 | OXYGEN SATURATION: 99 % | DIASTOLIC BLOOD PRESSURE: 76 MMHG | WEIGHT: 166 LBS | SYSTOLIC BLOOD PRESSURE: 130 MMHG | HEART RATE: 72 BPM | TEMPERATURE: 97.8 F

## 2024-02-16 DIAGNOSIS — Z98.890 OTHER SPECIFIED POSTPROCEDURAL STATES: Chronic | ICD-10-CM

## 2024-02-16 PROCEDURE — 99214 OFFICE O/P EST MOD 30 MIN: CPT | Mod: GC

## 2024-02-16 RX ORDER — LIDOCAINE 40 MG/G
4 PATCH TOPICAL
Qty: 30 | Refills: 3 | Status: DISCONTINUED | COMMUNITY
Start: 2022-11-20 | End: 2024-02-16

## 2024-02-16 RX ORDER — OLOPATADINE HCL 1 MG/ML
0.1 SOLUTION/ DROPS OPHTHALMIC TWICE DAILY
Qty: 3 | Refills: 0 | Status: DISCONTINUED | COMMUNITY
Start: 2023-04-27 | End: 2024-02-16

## 2024-02-16 RX ORDER — INSULIN GLARGINE-YFGN 100 [IU]/ML
100 INJECTION, SOLUTION SUBCUTANEOUS AT BEDTIME
Qty: 1 | Refills: 3 | Status: ACTIVE | COMMUNITY
Start: 2023-03-08 | End: 1900-01-01

## 2024-02-16 NOTE — PHYSICAL EXAM
[Normal Sclera/Conjunctiva] : normal sclera/conjunctiva [EOMI] : extraocular movements intact [Normal Outer Ear/Nose] : the outer ears and nose were normal in appearance [Soft] : abdomen soft [Non Tender] : non-tender [Coordination Grossly Intact] : coordination grossly intact [Normal Gait] : normal gait [Normal] : affect was normal and insight and judgment were intact

## 2024-02-18 LAB
CHOLEST SERPL-MCNC: 206 MG/DL
ESTIMATED AVERAGE GLUCOSE: 255 MG/DL
HBA1C MFR BLD HPLC: 10.5 %
HDLC SERPL-MCNC: 31 MG/DL
LDLC SERPL CALC-MCNC: 125 MG/DL
NONHDLC SERPL-MCNC: 175 MG/DL
TRIGL SERPL-MCNC: 279 MG/DL

## 2024-02-18 NOTE — HISTORY OF PRESENT ILLNESS
[FreeTextEntry1] : 3 month follow up for diabetes [de-identified] : 62 yo M with HLD, T2DM and necrotizing autoimmune myopathy presenting for follow up DM. Patient states FS levels around 200-220. Patient utilizing elyse machine however did not bring today. States has been trying to dose basal insulin at 22U at night in order to help get glucose under control.

## 2024-02-18 NOTE — CURRENT MEDS
[Takes medication as prescribed] : does not take [FreeTextEntry1] : Taking Steglatro and Basal insulin daily, however not taking aspirin- encouraged to continue aspirin

## 2024-02-20 DIAGNOSIS — E11.9 TYPE 2 DIABETES MELLITUS WITHOUT COMPLICATIONS: ICD-10-CM

## 2024-02-22 DIAGNOSIS — E11.9 TYPE 2 DIABETES MELLITUS WITHOUT COMPLICATIONS: ICD-10-CM

## 2024-03-06 ENCOUNTER — NON-APPOINTMENT (OUTPATIENT)
Age: 64
End: 2024-03-06

## 2024-03-06 ENCOUNTER — LABORATORY RESULT (OUTPATIENT)
Age: 64
End: 2024-03-06

## 2024-03-06 ENCOUNTER — APPOINTMENT (OUTPATIENT)
Dept: CARDIOLOGY | Facility: CLINIC | Age: 64
End: 2024-03-06
Payer: MEDICAID

## 2024-03-06 VITALS
SYSTOLIC BLOOD PRESSURE: 126 MMHG | HEIGHT: 68 IN | HEART RATE: 70 BPM | TEMPERATURE: 97.7 F | BODY MASS INDEX: 25.01 KG/M2 | WEIGHT: 165 LBS | RESPIRATION RATE: 16 BRPM | DIASTOLIC BLOOD PRESSURE: 79 MMHG | OXYGEN SATURATION: 99 %

## 2024-03-06 DIAGNOSIS — I25.10 ATHEROSCLEROTIC HEART DISEASE OF NATIVE CORONARY ARTERY W/OUT ANGINA PECTORIS: ICD-10-CM

## 2024-03-06 DIAGNOSIS — G72.89 OTHER SPECIFIED MYOPATHIES: ICD-10-CM

## 2024-03-06 DIAGNOSIS — E11.9 TYPE 2 DIABETES MELLITUS W/OUT COMPLICATIONS: ICD-10-CM

## 2024-03-06 DIAGNOSIS — Z79.4 TYPE 2 DIABETES MELLITUS W/OUT COMPLICATIONS: ICD-10-CM

## 2024-03-06 DIAGNOSIS — R01.1 CARDIAC MURMUR, UNSPECIFIED: ICD-10-CM

## 2024-03-06 DIAGNOSIS — Z86.79 PERSONAL HISTORY OF OTHER DISEASES OF THE CIRCULATORY SYSTEM: ICD-10-CM

## 2024-03-06 DIAGNOSIS — E78.5 HYPERLIPIDEMIA, UNSPECIFIED: ICD-10-CM

## 2024-03-06 PROCEDURE — G2211 COMPLEX E/M VISIT ADD ON: CPT | Mod: NC,1L

## 2024-03-06 PROCEDURE — 99204 OFFICE O/P NEW MOD 45 MIN: CPT | Mod: 25

## 2024-03-06 PROCEDURE — 93000 ELECTROCARDIOGRAM COMPLETE: CPT

## 2024-03-06 NOTE — REASON FOR VISIT
[CV Risk Factors and Non-Cardiac Disease] : CV risk factors and non-cardiac disease [FreeTextEntry1] : This is a 63-year-old male presenting for lipid consultation.  Past medical history significant for hyperlipidemia, hypertension, insulin dependent type 2 diabetes mellitus, GERD, anemia, and necrotizing autoimmune myopathy (s/p IVIG and steroids). Family history significant for several family members having diabetes, and his father  at 52 due to leukemia Social history: denies smoking, alcohol, or illicit drug use. Patient was born in [facility].  Patient is currently taking glargine insulin, Steglatro 15mg, and CellCept.    Patient feels generally well today and denies any recent episodes of chest pain, palpitations, dizziness, or syncope.   About 2 years ago, patient states he got sick after taking the COVID vaccine and was assessed at [facility] for muscle weakness. Patient states he was told he had a small heart attack but did not believe them because he felt like his heart was fine. Later on, patient states he was reassessed at [facility] and admitted for 11 days where they did tests to check his heart and did a biopsy of his muscles and patient states they diagnosed him with the autoimmune myopathy.   Patient states he lost around 60 lbs since his myopathy started. Patient regularly follows up with his rheumatologist for management of his myopathy. In the past, patient's CPK was as high as 3193 (8/3/21). As of 2023, the patient's CPK is still elevated but  to 412.  Patient denies having an endocrinologist for management of his diabetes, but regularly follows up with his primary care doctor.  Labs 24 demonstrated cholesterol 206, , HDL 31, triglycerides 279, a1c 10.5. Patient states he took a statin about 10 years ago but discontinued it shortly after because his levels normalized after taking the medication and exercising. Blood work to be drawn today.  EKG to be done today.   TTE 21 demonstrated hyperdynamic left ventricular systolic function with ejection fraction 75-80%, mild diastolic dysfunction (Stage I), increased relative wall thickness with normal left ventricular mass index, consistent with concentric left ventricular remodeling.

## 2024-03-06 NOTE — DISCUSSION/SUMMARY
[FreeTextEntry1] : This is a 63-year-old [place] male with past medical history significant for insulin-dependent diabetes, hyperlipidemia, GERD, anemia, necrotizing autoimmune myopathy diagnosed about 2 years ago (confirmed on muscle biopsy, with a CPK of over 3100 which occurred approximately 2 months after COVID-vaccine, treated with steroids and IVIG, currently on CellCept), who comes in for lipid consultation. He is closely followed by his rheumatologist. He denies chest pain, shortness of breath, dizziness or syncope.  He was born in [facility] has no history of rheumatic fever.  Diabetes mellitus, he does not drink excessive caffeine or alcohol. Cardiac risk factors include hyperlipidemia. Electrocardiogram done 2024 demonstrated normal sinus rhythm rate 64 bpm is otherwise unremarkable. Blood work done 2024 demonstrated cholesterol 206, triglyceride 279, HDL 31, LDL calculated 125, non-HDL cholesterol 175 mg/dL, and hemoglobin A1c of 10.5. The patient will have blood work done today for lipoprotein a, lipoprotein B, high-sensitivity C-reactive protein. The patient was hospitalized in  at [facility] and then eventually went to [facility] where he reports having normal cardiac catheterization, and comprehensive evaluation for his myopathy and elevated CPK. He also reports that he lost over 70 pounds. Echo Doppler examination done 2021 demonstrated minimal mitral valve regurgitation, minimal tricuspid valve regurgitation, concentric left ventricular hypertrophy, normal ejection fraction 75 to 80%. Cardiac catheterization done 2021 at [facility] demonstrated left main with minor irregularities, left anterior descending artery and first diagonal branch with minor luminal irregularities, left circumflex branch and obtuse marginal 1 and obtuse marginal 2, all had minor luminal irregularities, the distal right coronary artery had 40% stenosis. Given the presence of coronary artery disease, his LDL target is less than 70 mg/dL PMH:  About 2 years ago, patient states he got sick after taking the COVID vaccine and was assessed at [facility] for muscle weakness. Patient states he was told he had a small heart attack but did not believe them because he felt like his heart was fine. Later on, patient states he was reassessed at [facility] and admitted for 11 days where they did tests to check his heart and did a biopsy of his muscles and patient states they diagnosed him with the autoimmune myopathy.   Patient states he lost around 60 lbs since his myopathy started. Patient regularly follows up with his rheumatologist for management of his myopathy. In the past, patient's CPK was as high as 3193 (8/3/21). As of 2023, the patient's CPK is still elevated but  to 412.  Labs 24 demonstrated cholesterol 206, , HDL 31, triglycerides 279, a1c 10.5. Patient states he took a statin about 10 years ago but discontinued it shortly after because his levels normalized after taking the medication and exercising.  The patient will require lipid-lowering therapy with an LDL target of less than 70 mg/dL. Given the consideration of his myopathy, I have recommended he start Leqvio.  If he gets insurance coverage for the Leqvio as a subcutaneous therapy twice a year with a reduction LDL cholesterol 50%, the patient was then achieve his LDL target. If he is unable to get coverage for Leqvio therapy, then I would recommend PCSK9 injectable therapy every 2 weeks. The patient will follow-up with me in 1 month. The patient is instructed follow-up with his primary care physician, rheumatologist. The patient understands that aerobic exercises must be increased to 40 minutes 4 times per week. A detailed discussion of lifestyle modification was done today. The patient has a good understanding of the diagnosis, and treatment plan. Lifestyle modification was also outlined.  Thank you for allow me to participate in the care of your patient.

## 2024-03-06 NOTE — PHYSICAL EXAM
[Well Developed] : well developed [No Acute Distress] : no acute distress [Well Nourished] : well nourished [Normal Conjunctiva] : normal conjunctiva [Normal Venous Pressure] : normal venous pressure [No Carotid Bruit] : no carotid bruit [Normal S1, S2] : normal S1, S2 [No Rub] : no rub [No Gallop] : no gallop [5th Left ICS - MCL] : palpated at the 5th LICS in the midclavicular line [Normal] : normal [No Precordial Heave] : no precordial heave was noted [Normal Rate] : normal [Rhythm Regular] : regular [Normal S1] : normal S1 [Normal S2] : normal S2 [II] : a grade 2 [No Pitting Edema] : no pitting edema present [2+] : left 2+ [No Abnormalities] : the abdominal aorta was not enlarged and no bruit was heard [Clear Lung Fields] : clear lung fields [Good Air Entry] : good air entry [No Respiratory Distress] : no respiratory distress  [Soft] : abdomen soft [No Masses/organomegaly] : no masses/organomegaly [Non Tender] : non-tender [Normal Bowel Sounds] : normal bowel sounds [No Edema] : no edema [Normal Gait] : normal gait [No Cyanosis] : no cyanosis [No Clubbing] : no clubbing [No Varicosities] : no varicosities [No Skin Lesions] : no skin lesions [No Rash] : no rash [Moves all extremities] : moves all extremities [No Focal Deficits] : no focal deficits [Normal Speech] : normal speech [Alert and Oriented] : alert and oriented [Normal memory] : normal memory [S3] : no S3 [S4] : no S4 [Right Carotid Bruit] : no bruit heard over the right carotid [Left Carotid Bruit] : no bruit heard over the left carotid [Right Femoral Bruit] : no bruit heard over the right femoral artery [Left Femoral Bruit] : no bruit heard over the left femoral artery

## 2024-03-07 ENCOUNTER — NON-APPOINTMENT (OUTPATIENT)
Age: 64
End: 2024-03-07

## 2024-03-12 ENCOUNTER — NON-APPOINTMENT (OUTPATIENT)
Age: 64
End: 2024-03-12

## 2024-04-15 ENCOUNTER — APPOINTMENT (OUTPATIENT)
Dept: RHEUMATOLOGY | Facility: CLINIC | Age: 64
End: 2024-04-15

## 2024-04-22 ENCOUNTER — APPOINTMENT (OUTPATIENT)
Dept: INTERNAL MEDICINE | Facility: CLINIC | Age: 64
End: 2024-04-22

## 2024-04-22 NOTE — END OF VISIT
Date of Service:  4/22/2024    PCP: Surjit Narvaez DO     Chief Complaint:   Benign Prostatic Hyperplasia/Lower Urinary Tract Symptoms  Nocturia    History of Present Illness:  The patient is a 64 year old male who was last seen in the office on 04/01/2024.     He was seen by Dr. Menendez in the past. Records from 05/22/2017 indicate the patient has a history of BPH w/ obstruction. He was placed on Flomax 0.8 mg daily and finasteride with improvement of daytime symptoms. Nocturia improved to every 3 hours after reducing caffeine. No noticeable improvement on Oxybutynin.      Had a PSA of 3.63 from 08/17/2023. He denied family history of prostate, bladder and kidney cancer.     On 02/29/2024 he reported he was on Flomax 0.4 mg BID for 9 months. He reported he also take Finasteride 5 mg daily for the same amount of time but his nocturia persisted (every 2 hours). He reported urinary hesitancy if he tried to pee without an immediate urge. He reported he had these urination issues for 2 years. He reported his nocturia was a nuisance but that he also has intermittent stream and straining. He denied gross hematuria and history of UTI's. Patient AUA score was 33, with a bother score of 4.     The patient returned to the office on 04/01/2024 reporting some improvement, but symptoms still severe on Oxybutynin 5 mg nightly. Denied side effects. He continued on Flomax 0.4 mg BID and Finasteride 5 mg daily. Denied gross hematuria and dysuria. Patient AUA score was 28, with a bother score of 4.    The patient returns to the office today for a cystoscopy. Reports improvement of his nocturia from q2 hours to now q3 hours. Denies gross hematuria or recent infections.     Medications:  Current Outpatient Medications   Medication Sig Dispense Refill    oxybutynin (DITROPAN) 5 MG tablet Take 1 tablet by mouth at bedtime. 90 tablet 0    tamsulosin (FLOMAX) 0.4 MG Cap TAKE ONE CAPSULE BY MOUTH EVERY MORNING AND 1 CAPSULE IN THE  [] : Resident EVENING, TAKE AFTER MEALS 180 capsule 1    finasteride (PROSCAR) 5 MG tablet TAKE 1 TABLET BY MOUTH DAILY 90 tablet 1    prednisoLONE acetate (PRED FORTE) 1 % ophthalmic suspension       timolol (TIMOPTIC) 0.25 % ophthalmic solution       Prolensa 0.07 % ophthalmic solution        No current facility-administered medications for this visit.       Allergies:  ALLERGIES:  No Known Allergies    Past Medical History:   Past Medical History:   Diagnosis Date    Glaucoma     both eyes    PONV (postoperative nausea and vomiting)     Retinal detachment 5/22/2015    right eye       Family History:  Family History   Problem Relation Age of Onset    Diabetes Father     Hypertension Father        Surgical History:  Past Surgical History:   Procedure Laterality Date    Eye surgery      detach retina    Eye surgery      may cataracts    Tonsillectomy and adenoidectomy      age 5   :    Social History:  Social History     Socioeconomic History    Marital status: Single     Spouse name: Not on file    Number of children: Not on file    Years of education: Not on file    Highest education level: Not on file   Occupational History    Not on file   Tobacco Use    Smoking status: Never    Smokeless tobacco: Never   Substance and Sexual Activity    Alcohol use: No     Alcohol/week: 0.0 standard drinks of alcohol     Comment: social    Drug use: No    Sexual activity: Not on file   Other Topics Concern    Not on file   Social History Narrative    Not on file     Social Determinants of Health     Financial Resource Strain: Not on file   Food Insecurity: Not on file   Transportation Needs: Not on file   Physical Activity: Not on file   Stress: Not on file   Social Connections: Not on file   Interpersonal Safety: Not on file (3/8/2021)        Physical Exam:  There were no vitals taken for this visit.    Constitutional:  Well developed, well nourished, afebrile.    In-Office Testing  No results found for this visit on 04/22/24.    PSA:  Lab  Results   Component Value Date    PSA 3.63 08/17/2023    PSA 4.00 07/22/2022    PSA 2.85 07/21/2021    PSA 2.24 06/30/2020    PSA 2.45 06/06/2019    PSA 2.30 08/22/2017    PSA 2.19 09/17/2016    PSA 2.79 08/31/2015     Assessment and Plan:    Benign Prostatic Hyperplasia/Lower Urinary Tract Symptoms, Nocturia- Some improvement, but symptoms still severe after starting Oxybutynin 5 mg at bedtime. Continues on Flomax 0.4 mg BID and Finasteride 5 mg daily.     Bladder scan revealed 8 cc. Cystoscopy today revealed minimal lateral lobe hyperplasia with minimal partial obstruction. Small 2-4 mm papillary lesion on the right posterior lateral. We discussed options.     -We will proceed with a cytoscopy with bladder biopsy and Greenlight laser under general anesthesia.      -He will start Oxybutynin 10 mg once nightly and continue Flomax 0.4 mg BID daily and Finasteride 5 mg once daily.      I had a brief counseling session with the patient concerning the diagnosis and treatment options.     I performed a brief review of the patient's medical records.     I reviewed the data with patient, including diagnosis, prognosis, and treatment.    Tests reviewed: Urinalysis    Tests ordered:   Pathology    On 4/22/2024, IKhris scribed the services personally performed by Maxx Zimmer Jr., MD.     but are not limited to bleeding requiring transfusion, infection, stricture, bladder neck contracture, incontinence, impotence, retrograde ejaculation, injury to surrounding structures, treatment failure,Electrolyte abnormality DVT/PE, continued irritative or obstructive voiding symptoms. The patient voiced understanding told above and wish to proceed.  Educational materials concerning the proposed surgical procedure were supplied to the patient.       I had a brief counseling session with the patient concerning the diagnosis and treatment options.     I performed a brief review of the patient's medical records.     I reviewed the data with patient, including diagnosis, prognosis, and treatment.    Tests reviewed: Urinalysis    Tests ordered:  None    On 4/22/2024, IKhris scribed the services personally performed by Maxx Zimmer Jr., MD.    The documentation recorded by the scribe accurately and completely reflects the service(s) I personally performed and the decisions made by me.      Maxx Zimmer Jr., MD [facility]  Department of Urology  [facility] [place]  [phone]      Note to Patient/ Disclaimer:  The 21st Century Cures Act makes medical notes like this available to patients in the interest of transparency. Please be advised that this is a medical document. It is intended as [id number] communication. It is written with medical language and may contain abbreviations and/or words and phrases that are unfamiliar to patients. Certain statements may appear blunt or direct. There is no intention to offend a patient with anything mentioned here.  Medical documents are intended to share information and clinical opinions.  This document is not intended to be a comprehensive summary of the medical record or a clinical circumstance.  Plans may change based on information not mentioned or referred to in this note. This note may have been transcribed using a voice-recognition  software dictation system. Voice-recognition transcription errors may occur. Dictation errors should not change the meaning of the note.

## 2024-04-29 ENCOUNTER — RX RENEWAL (OUTPATIENT)
Age: 64
End: 2024-04-29

## 2024-04-29 RX ORDER — FLASH GLUCOSE SENSOR
KIT MISCELLANEOUS
Qty: 2 | Refills: 3 | Status: ACTIVE | COMMUNITY
Start: 2023-05-24 | End: 1900-01-01

## 2024-06-19 RX ORDER — SITAGLIPTIN AND METFORMIN HYDROCHLORIDE 500; 50 MG/1; MG/1
1 TABLET, FILM COATED ORAL
Qty: 0 | Refills: 0 | DISCHARGE

## 2024-06-19 RX ORDER — ASPIRIN/CALCIUM CARB/MAGNESIUM 324 MG
1 TABLET ORAL
Qty: 0 | Refills: 0 | DISCHARGE

## 2024-06-19 RX ORDER — INSULIN DETEMIR 100/ML (3)
10 INSULIN PEN (ML) SUBCUTANEOUS
Qty: 0 | Refills: 0 | DISCHARGE

## 2024-08-06 NOTE — PRE-ANESTHESIA EVALUATION ADULT - NSANTHAGERD_ENT_A_CORE
Lani Babcock is here for IV hydration due to dehydration    Reviewed and verified Advanced Directives: No: Patient declined to create/provide document at this time     Is the patient on an active anti-cancer treatment plan? No,   Nursing Assessment:  A comprehensive nursing assessment was performed and the patient reports the following:    Nausea: NO  Vomiting: NO  Fever: NO  Chills: NO  Other signs of infection: NO  Bleeding: NO  Mucositis: NO  Diarrhea: NO  Constipation: NO  Anorexia: YES  Dysuria: NO  Urinary Bleeding: NO  Cough: NO  Shortness of Breath: NO  Fatigue/Weakness: YES  Numbness/Tingling: YES  Other Neuropathies: NO  Edema: NO  Rash: NO  Hand/Foot Syndrome: NO  Anxiety/Depression/Insomnia: YES  Pain: YES, Pain Rating:  10, Location: stomach into back, Pain Description: constant sharp  During this visit, medication was administered to treat pain: No, Patient has intrathecal pump. Patient going to talk with provider about increasing dose.       Is this patient status post Stem Cell Transplant? No    Vitals:  Orthostatic BP Reading:  Vitals:    08/06/24 1356 08/06/24 1357   BP: 131/70 114/67   BP Location: RUE - Right upper extremity RUE - Right upper extremity   Patient Position: Sitting Standing   Cuff Size: Regular Regular   Pulse: 94 98   Resp: 16    Temp: 97.8 °F (36.6 °C)    TempSrc: Temporal    Weight: 76.6 kg (168 lb 14.4 oz)       Weight:  Wt Readings from Last 1 Encounters:   08/06/24 76.6 kg (168 lb 14.4 oz)     Labs:  Sodium (mmol/L)   Date Value   09/26/2023 138     Potassium (mmol/L)   Date Value   09/26/2023 3.7     Chloride (mmol/L)   Date Value   09/26/2023 105     Glucose (mg/dL)   Date Value   09/26/2023 117 (H)     Calcium (mg/dL)   Date Value   09/26/2023 8.4     Carbon Dioxide (mmol/L)   Date Value   09/26/2023 26     BUN (mg/dL)   Date Value   09/26/2023 14     Creatinine (mg/dL)   Date Value   09/26/2023 0.86     Magnesium (mg/dL)   Date Value   03/01/2023 1.8       Central Line  Yes Care: See Invasive (Oncology) Lines Flowsheet and MAR for CVAD documentation as appropriate.    Post Treatment: Treatment tolerated well; no adverse reaction    Education: Patient Education: No new instructions needed    Patient Discharged: patient discharged to home per self, ambulatory    Next appointment scheduled: 8/09/24 for IVF  Patient instructed to call the office with any questions or concerns.  Ramila Slater is supervising clinician today.  Fall risk 60  ([place] Fall Risk)    Distress Tool   Not indicated    Administrations This Visit     sodium chloride 0.9% infusion     Admin Date  08/06/2024 Action  New Bag Dose   Rate  999 mL/hr Route  Intravenous Documented By  Tana Chávez, RN

## 2024-08-13 ENCOUNTER — APPOINTMENT (OUTPATIENT)
Dept: CARDIOLOGY | Facility: CLINIC | Age: 64
End: 2024-08-13

## 2024-09-26 NOTE — PRE-ANESTHESIA EVALUATION ADULT - NSANTHBPHIGHRD_ENT_A_CORE
Cologuard order placed per [facility] Colorectal Cancer Screening Gap Report-Cologuard/order.     
No

## 2024-11-16 PROBLEM — E11.9 TYPE 2 DIABETES MELLITUS WITHOUT COMPLICATIONS: Chronic | Status: ACTIVE | Noted: 2021-08-31

## 2025-02-03 ENCOUNTER — OUTPATIENT (OUTPATIENT)
Dept: OUTPATIENT SERVICES | Facility: HOSPITAL | Age: 65
LOS: 1 days | End: 2025-02-03

## 2025-02-03 ENCOUNTER — APPOINTMENT (OUTPATIENT)
Dept: RHEUMATOLOGY | Facility: CLINIC | Age: 65
End: 2025-02-03

## 2025-02-03 VITALS
DIASTOLIC BLOOD PRESSURE: 70 MMHG | WEIGHT: 159 LBS | OXYGEN SATURATION: 98 % | HEART RATE: 79 BPM | BODY MASS INDEX: 24.1 KG/M2 | SYSTOLIC BLOOD PRESSURE: 122 MMHG | HEIGHT: 68 IN

## 2025-02-03 DIAGNOSIS — Z98.890 OTHER SPECIFIED POSTPROCEDURAL STATES: Chronic | ICD-10-CM

## 2025-02-03 DIAGNOSIS — Z79.899 OTHER LONG TERM (CURRENT) DRUG THERAPY: ICD-10-CM

## 2025-02-03 DIAGNOSIS — G72.89 OTHER SPECIFIED MYOPATHIES: ICD-10-CM

## 2025-02-03 PROCEDURE — 99203 OFFICE O/P NEW LOW 30 MIN: CPT | Mod: GC

## 2025-02-04 ENCOUNTER — NON-APPOINTMENT (OUTPATIENT)
Age: 65
End: 2025-02-04

## 2025-02-04 LAB
ALBUMIN SERPL ELPH-MCNC: 4.2 G/DL
ALP BLD-CCNC: 110 U/L
ALT SERPL-CCNC: 36 U/L
ANION GAP SERPL CALC-SCNC: 12 MMOL/L
AST SERPL-CCNC: 24 U/L
BASOPHILS # BLD AUTO: 0.03 K/UL
BASOPHILS NFR BLD AUTO: 0.5 %
BILIRUB SERPL-MCNC: 0.3 MG/DL
BUN SERPL-MCNC: 15 MG/DL
CALCIUM SERPL-MCNC: 9.2 MG/DL
CHLORIDE SERPL-SCNC: 100 MMOL/L
CHOLEST SERPL-MCNC: 157 MG/DL
CK SERPL-CCNC: 849 U/L
CO2 SERPL-SCNC: 27 MMOL/L
CREAT SERPL-MCNC: 0.81 MG/DL
CRP SERPL-MCNC: 55 MG/L
EGFR: 97 ML/MIN/1.73M2
EOSINOPHIL # BLD AUTO: 0.18 K/UL
EOSINOPHIL NFR BLD AUTO: 3 %
ERYTHROCYTE [SEDIMENTATION RATE] IN BLOOD BY WESTERGREN METHOD: 45 MM/HR
ESTIMATED AVERAGE GLUCOSE: 258 MG/DL
GLUCOSE SERPL-MCNC: 220 MG/DL
HAV IGM SER QL: NONREACTIVE
HBA1C MFR BLD HPLC: 10.6 %
HBV CORE IGG+IGM SER QL: NONREACTIVE
HBV CORE IGM SER QL: NONREACTIVE
HBV SURFACE AG SER QL: NONREACTIVE
HCT VFR BLD CALC: 42 %
HCV AB SER QL: NONREACTIVE
HCV S/CO RATIO: 0.08 S/CO
HDLC SERPL-MCNC: 24 MG/DL
HGB BLD-MCNC: 13 G/DL
IMM GRANULOCYTES NFR BLD AUTO: 0.2 %
LDLC SERPL CALC-MCNC: 105 MG/DL
LYMPHOCYTES # BLD AUTO: 2.44 K/UL
LYMPHOCYTES NFR BLD AUTO: 40.7 %
MAN DIFF?: NORMAL
MCHC RBC-ENTMCNC: 27.9 PG
MCHC RBC-ENTMCNC: 31 G/DL
MCV RBC AUTO: 90.1 FL
MONOCYTES # BLD AUTO: 0.61 K/UL
MONOCYTES NFR BLD AUTO: 10.2 %
NEUTROPHILS # BLD AUTO: 2.72 K/UL
NEUTROPHILS NFR BLD AUTO: 45.4 %
NONHDLC SERPL-MCNC: 133 MG/DL
PLATELET # BLD AUTO: 198 K/UL
POTASSIUM SERPL-SCNC: 5 MMOL/L
PROT SERPL-MCNC: 7.2 G/DL
RBC # BLD: 4.66 M/UL
RBC # FLD: 11.7 %
SODIUM SERPL-SCNC: 139 MMOL/L
TRIGL SERPL-MCNC: 156 MG/DL
WBC # FLD AUTO: 5.99 K/UL

## 2025-02-06 ENCOUNTER — NON-APPOINTMENT (OUTPATIENT)
Age: 65
End: 2025-02-06

## 2025-02-06 LAB
M TB IFN-G BLD-IMP: NEGATIVE
QUANTIFERON TB PLUS MITOGEN MINUS NIL: 9.4 IU/ML
QUANTIFERON TB PLUS NIL: 0.05 IU/ML
QUANTIFERON TB PLUS TB1 MINUS NIL: 0.01 IU/ML
QUANTIFERON TB PLUS TB2 MINUS NIL: 0.01 IU/ML

## 2025-02-07 ENCOUNTER — APPOINTMENT (OUTPATIENT)
Dept: INTERNAL MEDICINE | Facility: CLINIC | Age: 65
End: 2025-02-07

## 2025-02-07 ENCOUNTER — OUTPATIENT (OUTPATIENT)
Dept: OUTPATIENT SERVICES | Facility: HOSPITAL | Age: 65
LOS: 1 days | End: 2025-02-07

## 2025-02-07 VITALS — DIASTOLIC BLOOD PRESSURE: 82 MMHG | SYSTOLIC BLOOD PRESSURE: 128 MMHG

## 2025-02-07 VITALS
WEIGHT: 164 LBS | HEIGHT: 68 IN | HEART RATE: 72 BPM | SYSTOLIC BLOOD PRESSURE: 136 MMHG | DIASTOLIC BLOOD PRESSURE: 80 MMHG | RESPIRATION RATE: 16 BRPM | TEMPERATURE: 97.8 F | BODY MASS INDEX: 24.86 KG/M2 | OXYGEN SATURATION: 98 %

## 2025-02-07 DIAGNOSIS — Z98.890 OTHER SPECIFIED POSTPROCEDURAL STATES: Chronic | ICD-10-CM

## 2025-02-07 DIAGNOSIS — I10 ESSENTIAL (PRIMARY) HYPERTENSION: ICD-10-CM

## 2025-02-07 PROCEDURE — G2211 COMPLEX E/M VISIT ADD ON: CPT

## 2025-02-07 PROCEDURE — 99203 OFFICE O/P NEW LOW 30 MIN: CPT

## 2025-02-07 RX ORDER — ISOPROPYL ALCOHOL 70 ML/100ML
SWAB TOPICAL
Qty: 1 | Refills: 3 | Status: ACTIVE | COMMUNITY
Start: 2025-02-07 | End: 1900-01-01

## 2025-02-07 RX ORDER — LISINOPRIL 2.5 MG/1
2.5 TABLET ORAL DAILY
Qty: 90 | Refills: 3 | Status: ACTIVE | COMMUNITY
Start: 2025-02-07 | End: 1900-01-01

## 2025-02-09 LAB — HMGCR ANTIBODY, IGG: 149 UNITS

## 2025-02-13 ENCOUNTER — TRANSCRIPTION ENCOUNTER (OUTPATIENT)
Age: 65
End: 2025-02-13

## 2025-02-20 DIAGNOSIS — Z79.899 OTHER LONG TERM (CURRENT) DRUG THERAPY: ICD-10-CM

## 2025-02-27 PROBLEM — Z23 ENCOUNTER FOR IMMUNIZATION: Status: RESOLVED | Noted: 2021-10-18 | Resolved: 2025-02-27

## 2025-03-03 ENCOUNTER — APPOINTMENT (OUTPATIENT)
Dept: INTERNAL MEDICINE | Facility: CLINIC | Age: 65
End: 2025-03-03

## 2025-03-11 ENCOUNTER — NON-APPOINTMENT (OUTPATIENT)
Age: 65
End: 2025-03-11

## 2025-03-14 ENCOUNTER — APPOINTMENT (OUTPATIENT)
Dept: INTERNAL MEDICINE | Facility: CLINIC | Age: 65
End: 2025-03-14
Payer: MEDICARE

## 2025-03-14 ENCOUNTER — OUTPATIENT (OUTPATIENT)
Dept: OUTPATIENT SERVICES | Facility: HOSPITAL | Age: 65
LOS: 1 days | End: 2025-03-14

## 2025-03-14 VITALS
OXYGEN SATURATION: 99 % | BODY MASS INDEX: 24.4 KG/M2 | HEIGHT: 68 IN | DIASTOLIC BLOOD PRESSURE: 84 MMHG | SYSTOLIC BLOOD PRESSURE: 142 MMHG | HEART RATE: 68 BPM | WEIGHT: 161 LBS

## 2025-03-14 VITALS — DIASTOLIC BLOOD PRESSURE: 78 MMHG | SYSTOLIC BLOOD PRESSURE: 146 MMHG

## 2025-03-14 DIAGNOSIS — Z79.4 TYPE 2 DIABETES MELLITUS W/OUT COMPLICATIONS: ICD-10-CM

## 2025-03-14 DIAGNOSIS — Z98.890 OTHER SPECIFIED POSTPROCEDURAL STATES: Chronic | ICD-10-CM

## 2025-03-14 DIAGNOSIS — I10 ESSENTIAL (PRIMARY) HYPERTENSION: ICD-10-CM

## 2025-03-14 DIAGNOSIS — E11.9 TYPE 2 DIABETES MELLITUS W/OUT COMPLICATIONS: ICD-10-CM

## 2025-03-14 PROCEDURE — G2211 COMPLEX E/M VISIT ADD ON: CPT

## 2025-03-14 PROCEDURE — 99213 OFFICE O/P EST LOW 20 MIN: CPT

## 2025-03-14 RX ORDER — SITAGLIPTIN 100 MG/1
100 TABLET, FILM COATED ORAL DAILY
Qty: 30 | Refills: 5 | Status: ACTIVE | COMMUNITY
Start: 2025-03-14 | End: 1900-01-01

## 2025-03-14 RX ORDER — EMPAGLIFLOZIN 10 MG/1
10 TABLET, FILM COATED ORAL
Qty: 30 | Refills: 5 | Status: ACTIVE | COMMUNITY
Start: 2025-03-14 | End: 1900-01-01

## 2025-03-14 RX ORDER — SITAGLIPTIN AND METFORMIN HYDROCHLORIDE 100; 1000 MG/1; MG/1
100-1000 TABLET, FILM COATED, EXTENDED RELEASE ORAL DAILY
Qty: 30 | Refills: 5 | Status: DISCONTINUED | COMMUNITY
Start: 2025-03-14 | End: 2025-03-14

## 2025-03-17 ENCOUNTER — TRANSCRIPTION ENCOUNTER (OUTPATIENT)
Age: 65
End: 2025-03-17

## 2025-03-17 ENCOUNTER — NON-APPOINTMENT (OUTPATIENT)
Age: 65
End: 2025-03-17

## 2025-03-17 DIAGNOSIS — E11.9 TYPE 2 DIABETES MELLITUS WITHOUT COMPLICATIONS: ICD-10-CM

## 2025-03-17 DIAGNOSIS — I10 ESSENTIAL (PRIMARY) HYPERTENSION: ICD-10-CM

## 2025-03-17 LAB
CREAT SPEC-SCNC: 101 MG/DL
MICROALBUMIN 24H UR DL<=1MG/L-MCNC: 5.4 MG/DL
MICROALBUMIN/CREAT 24H UR-RTO: 54 MG/G

## 2025-03-24 ENCOUNTER — APPOINTMENT (OUTPATIENT)
Dept: RHEUMATOLOGY | Facility: CLINIC | Age: 65
End: 2025-03-24

## 2025-04-18 ENCOUNTER — APPOINTMENT (OUTPATIENT)
Dept: INTERNAL MEDICINE | Facility: CLINIC | Age: 65
End: 2025-04-18

## 2025-05-07 ENCOUNTER — RX RENEWAL (OUTPATIENT)
Age: 65
End: 2025-05-07

## 2025-05-12 ENCOUNTER — APPOINTMENT (OUTPATIENT)
Dept: INTERNAL MEDICINE | Facility: CLINIC | Age: 65
End: 2025-05-12

## 2025-05-23 ENCOUNTER — APPOINTMENT (OUTPATIENT)
Dept: INTERNAL MEDICINE | Facility: CLINIC | Age: 65
End: 2025-05-23

## 2025-05-23 ENCOUNTER — OUTPATIENT (OUTPATIENT)
Dept: OUTPATIENT SERVICES | Facility: HOSPITAL | Age: 65
LOS: 1 days | End: 2025-05-23

## 2025-05-23 VITALS
OXYGEN SATURATION: 98 % | WEIGHT: 161 LBS | SYSTOLIC BLOOD PRESSURE: 136 MMHG | DIASTOLIC BLOOD PRESSURE: 84 MMHG | HEIGHT: 68 IN | BODY MASS INDEX: 24.4 KG/M2 | HEART RATE: 73 BPM

## 2025-05-23 DIAGNOSIS — Z98.890 OTHER SPECIFIED POSTPROCEDURAL STATES: Chronic | ICD-10-CM

## 2025-05-23 DIAGNOSIS — J45.909 UNSPECIFIED ASTHMA, UNCOMPLICATED: ICD-10-CM

## 2025-05-23 DIAGNOSIS — G72.89 OTHER SPECIFIED MYOPATHIES: ICD-10-CM

## 2025-05-23 DIAGNOSIS — I10 ESSENTIAL (PRIMARY) HYPERTENSION: ICD-10-CM

## 2025-05-23 DIAGNOSIS — Z79.4 TYPE 2 DIABETES MELLITUS W/OUT COMPLICATIONS: ICD-10-CM

## 2025-05-23 DIAGNOSIS — E11.9 TYPE 2 DIABETES MELLITUS W/OUT COMPLICATIONS: ICD-10-CM

## 2025-05-23 DIAGNOSIS — E78.5 HYPERLIPIDEMIA, UNSPECIFIED: ICD-10-CM

## 2025-05-23 PROCEDURE — 99213 OFFICE O/P EST LOW 20 MIN: CPT

## 2025-05-23 RX ORDER — TELMISARTAN AND AMLODIPINE 40; 5 MG/1; MG/1
40-5 TABLET ORAL DAILY
Qty: 30 | Refills: 5 | Status: ACTIVE | COMMUNITY
Start: 2025-05-23 | End: 1900-01-01

## 2025-05-23 RX ORDER — BUDESONIDE AND FORMOTEROL FUMARATE DIHYDRATE 80; 4.5 UG/1; UG/1
80-4.5 AEROSOL RESPIRATORY (INHALATION)
Qty: 1 | Refills: 5 | Status: ACTIVE | COMMUNITY
Start: 2025-05-23 | End: 1900-01-01

## 2025-05-30 ENCOUNTER — TRANSCRIPTION ENCOUNTER (OUTPATIENT)
Age: 65
End: 2025-05-30

## 2025-05-30 LAB
ALBUMIN SERPL ELPH-MCNC: 4.2 G/DL
ALP BLD-CCNC: 117 U/L
ALT SERPL-CCNC: 89 U/L
ANION GAP SERPL CALC-SCNC: 14 MMOL/L
AST SERPL-CCNC: 50 U/L
BILIRUB SERPL-MCNC: 0.2 MG/DL
BUN SERPL-MCNC: 17 MG/DL
CALCIUM SERPL-MCNC: 9.3 MG/DL
CHLORIDE SERPL-SCNC: 100 MMOL/L
CK SERPL-CCNC: 2339 U/L
CO2 SERPL-SCNC: 24 MMOL/L
CREAT SERPL-MCNC: 0.73 MG/DL
CREAT SPEC-SCNC: 58 MG/DL
CRP SERPL-MCNC: 4 MG/L
EGFRCR SERPLBLD CKD-EPI 2021: 100 ML/MIN/1.73M2
ERYTHROCYTE [SEDIMENTATION RATE] IN BLOOD BY WESTERGREN METHOD: 49 MM/HR
ESTIMATED AVERAGE GLUCOSE: 223 MG/DL
GLUCOSE SERPL-MCNC: 224 MG/DL
HBA1C MFR BLD HPLC: 9.4 %
HCT VFR BLD CALC: 42 %
HGB BLD-MCNC: 12.9 G/DL
MCHC RBC-ENTMCNC: 27.4 PG
MCHC RBC-ENTMCNC: 30.7 G/DL
MCV RBC AUTO: 89.2 FL
MICROALBUMIN 24H UR DL<=1MG/L-MCNC: 4.5 MG/DL
MICROALBUMIN/CREAT 24H UR-RTO: 77 MG/G
PLATELET # BLD AUTO: 177 K/UL
POTASSIUM SERPL-SCNC: 5.2 MMOL/L
PROT SERPL-MCNC: 7.1 G/DL
RBC # BLD: 4.71 M/UL
RBC # FLD: 12.4 %
SODIUM SERPL-SCNC: 138 MMOL/L
WBC # FLD AUTO: 5.26 K/UL

## 2025-05-30 RX ORDER — TIRZEPATIDE 2.5 MG/.5ML
2.5 INJECTION, SOLUTION SUBCUTANEOUS
Qty: 1 | Refills: 3 | Status: ACTIVE | COMMUNITY
Start: 2025-05-30 | End: 1900-01-01

## 2025-06-02 DIAGNOSIS — J45.909 UNSPECIFIED ASTHMA, UNCOMPLICATED: ICD-10-CM

## 2025-06-02 DIAGNOSIS — I10 ESSENTIAL (PRIMARY) HYPERTENSION: ICD-10-CM

## 2025-06-02 DIAGNOSIS — E11.9 TYPE 2 DIABETES MELLITUS WITHOUT COMPLICATIONS: ICD-10-CM

## 2025-06-02 DIAGNOSIS — G72.89 OTHER SPECIFIED MYOPATHIES: ICD-10-CM

## 2025-06-02 DIAGNOSIS — E78.5 HYPERLIPIDEMIA, UNSPECIFIED: ICD-10-CM

## 2025-06-09 ENCOUNTER — OUTPATIENT (OUTPATIENT)
Dept: OUTPATIENT SERVICES | Facility: HOSPITAL | Age: 65
LOS: 1 days | End: 2025-06-09

## 2025-06-09 ENCOUNTER — APPOINTMENT (OUTPATIENT)
Dept: RHEUMATOLOGY | Facility: CLINIC | Age: 65
End: 2025-06-09
Payer: MEDICARE

## 2025-06-09 VITALS
WEIGHT: 160 LBS | OXYGEN SATURATION: 97 % | BODY MASS INDEX: 24.25 KG/M2 | SYSTOLIC BLOOD PRESSURE: 132 MMHG | DIASTOLIC BLOOD PRESSURE: 70 MMHG | HEIGHT: 68 IN | HEART RATE: 81 BPM | RESPIRATION RATE: 16 BRPM

## 2025-06-09 DIAGNOSIS — Z98.890 OTHER SPECIFIED POSTPROCEDURAL STATES: Chronic | ICD-10-CM

## 2025-06-09 PROCEDURE — G2211 COMPLEX E/M VISIT ADD ON: CPT

## 2025-06-09 PROCEDURE — 99214 OFFICE O/P EST MOD 30 MIN: CPT | Mod: GC

## 2025-06-10 DIAGNOSIS — Z79.899 OTHER LONG TERM (CURRENT) DRUG THERAPY: ICD-10-CM

## 2025-06-10 DIAGNOSIS — G72.89 OTHER SPECIFIED MYOPATHIES: ICD-10-CM

## 2025-06-10 DIAGNOSIS — M62.81 MUSCLE WEAKNESS (GENERALIZED): ICD-10-CM

## 2025-06-18 ENCOUNTER — NON-APPOINTMENT (OUTPATIENT)
Age: 65
End: 2025-06-18

## 2025-06-18 LAB
ALBUMIN SERPL ELPH-MCNC: 4.3 G/DL
ALP BLD-CCNC: 133 U/L
ALT SERPL-CCNC: 82 U/L
ANION GAP SERPL CALC-SCNC: 16 MMOL/L
AST SERPL-CCNC: 53 U/L
BILIRUB SERPL-MCNC: 0.2 MG/DL
BUN SERPL-MCNC: 20 MG/DL
CALCIUM SERPL-MCNC: 9.6 MG/DL
CHLORIDE SERPL-SCNC: 103 MMOL/L
CK SERPL-CCNC: 2236 U/L
CO2 SERPL-SCNC: 19 MMOL/L
CREAT SERPL-MCNC: 0.6 MG/DL
DEPRECATED KAPPA LC FREE/LAMBDA SER: 1.33 RATIO
EGFRCR SERPLBLD CKD-EPI 2021: 106 ML/MIN/1.73M2
GLUCOSE SERPL-MCNC: 240 MG/DL
IGA SERPL-MCNC: 344 MG/DL
IGG SERPL-MCNC: 1125 MG/DL
IGM SERPL-MCNC: 56 MG/DL
KAPPA LC CSF-MCNC: 1.69 MG/DL
KAPPA LC SERPL-MCNC: 2.24 MG/DL
POTASSIUM SERPL-SCNC: 4.8 MMOL/L
PROT SERPL-MCNC: 7 G/DL
SODIUM SERPL-SCNC: 138 MMOL/L
TPMT ENZYME INTERPRETATION: NORMAL
TPMT ENZYME METHODOLOGY: NORMAL
TPMT ENZYME: 21.9

## 2025-07-21 ENCOUNTER — APPOINTMENT (OUTPATIENT)
Dept: RHEUMATOLOGY | Facility: CLINIC | Age: 65
End: 2025-07-21
Payer: MEDICARE

## 2025-07-21 ENCOUNTER — OUTPATIENT (OUTPATIENT)
Dept: OUTPATIENT SERVICES | Facility: HOSPITAL | Age: 65
LOS: 1 days | End: 2025-07-21

## 2025-07-21 VITALS
SYSTOLIC BLOOD PRESSURE: 114 MMHG | WEIGHT: 160 LBS | OXYGEN SATURATION: 95 % | DIASTOLIC BLOOD PRESSURE: 64 MMHG | RESPIRATION RATE: 16 BRPM | HEART RATE: 70 BPM | HEIGHT: 68 IN | BODY MASS INDEX: 24.25 KG/M2

## 2025-07-21 DIAGNOSIS — G72.49 OTHER INFLAMMATORY AND IMMUNE MYOPATHIES, NOT ELSEWHERE CLASSIFIED: ICD-10-CM

## 2025-07-21 DIAGNOSIS — G72.89 OTHER SPECIFIED MYOPATHIES: ICD-10-CM

## 2025-07-21 DIAGNOSIS — Z79.899 OTHER LONG TERM (CURRENT) DRUG THERAPY: ICD-10-CM

## 2025-07-21 DIAGNOSIS — M62.81 MUSCLE WEAKNESS (GENERALIZED): ICD-10-CM

## 2025-07-21 DIAGNOSIS — Z98.890 OTHER SPECIFIED POSTPROCEDURAL STATES: Chronic | ICD-10-CM

## 2025-07-21 PROCEDURE — 99215 OFFICE O/P EST HI 40 MIN: CPT | Mod: GC

## 2025-07-21 PROCEDURE — G2211 COMPLEX E/M VISIT ADD ON: CPT

## 2025-07-21 RX ORDER — MYCOPHENOLATE MOFETIL 500 MG/1
500 TABLET ORAL TWICE DAILY
Qty: 90 | Refills: 0 | Status: ACTIVE | COMMUNITY
Start: 2025-07-21 | End: 1900-01-01

## 2025-07-25 LAB
ALBUMIN SERPL ELPH-MCNC: 4.4 G/DL
ALDOLASE SERPL-CCNC: 31.5 U/L
ALP BLD-CCNC: 116 U/L
ALT SERPL-CCNC: 75 U/L
ANION GAP SERPL CALC-SCNC: 13 MMOL/L
AST SERPL-CCNC: 47 U/L
BILIRUB SERPL-MCNC: 0.2 MG/DL
BUN SERPL-MCNC: 19 MG/DL
CALCIUM SERPL-MCNC: 9.3 MG/DL
CHLORIDE SERPL-SCNC: 103 MMOL/L
CK SERPL-CCNC: 2318 U/L
CO2 SERPL-SCNC: 22 MMOL/L
CREAT SERPL-MCNC: 0.66 MG/DL
CRP SERPL-MCNC: 10 MG/L
EGFRCR SERPLBLD CKD-EPI 2021: 103 ML/MIN/1.73M2
ERYTHROCYTE [SEDIMENTATION RATE] IN BLOOD BY WESTERGREN METHOD: 67 MM/HR
HCT VFR BLD CALC: 42 %
HGB BLD-MCNC: 12.9 G/DL
MCHC RBC-ENTMCNC: 27.5 PG
MCHC RBC-ENTMCNC: 30.7 G/DL
MCV RBC AUTO: 89.6 FL
PLATELET # BLD AUTO: 173 K/UL
POTASSIUM SERPL-SCNC: 5.3 MMOL/L
PROT SERPL-MCNC: 7.4 G/DL
RBC # BLD: 4.69 M/UL
RBC # FLD: 12 %
SODIUM SERPL-SCNC: 139 MMOL/L
WBC # FLD AUTO: 5.42 K/UL

## 2025-09-08 ENCOUNTER — LABORATORY RESULT (OUTPATIENT)
Age: 65
End: 2025-09-08

## 2025-09-08 ENCOUNTER — APPOINTMENT (OUTPATIENT)
Dept: RHEUMATOLOGY | Facility: CLINIC | Age: 65
End: 2025-09-08
Payer: MEDICARE

## 2025-09-08 VITALS
DIASTOLIC BLOOD PRESSURE: 82 MMHG | TEMPERATURE: 97.8 F | WEIGHT: 160 LBS | HEIGHT: 68 IN | RESPIRATION RATE: 17 BRPM | SYSTOLIC BLOOD PRESSURE: 161 MMHG | HEART RATE: 77 BPM | OXYGEN SATURATION: 98 % | BODY MASS INDEX: 24.25 KG/M2

## 2025-09-08 DIAGNOSIS — R13.12 DYSPHAGIA, OROPHARYNGEAL PHASE: ICD-10-CM

## 2025-09-08 DIAGNOSIS — G72.89 OTHER SPECIFIED MYOPATHIES: ICD-10-CM

## 2025-09-08 DIAGNOSIS — Z79.899 OTHER LONG TERM (CURRENT) DRUG THERAPY: ICD-10-CM

## 2025-09-08 DIAGNOSIS — G72.49 OTHER INFLAMMATORY AND IMMUNE MYOPATHIES, NOT ELSEWHERE CLASSIFIED: ICD-10-CM

## 2025-09-08 PROCEDURE — G2211 COMPLEX E/M VISIT ADD ON: CPT | Mod: GC

## 2025-09-08 PROCEDURE — 99215 OFFICE O/P EST HI 40 MIN: CPT | Mod: GC

## 2025-09-11 ENCOUNTER — NON-APPOINTMENT (OUTPATIENT)
Age: 65
End: 2025-09-11

## 2025-09-11 DIAGNOSIS — E87.5 HYPERKALEMIA: ICD-10-CM

## 2025-09-11 RX ORDER — SODIUM ZIRCONIUM CYCLOSILICATE 10 G/10G
10 POWDER, FOR SUSPENSION ORAL
Qty: 10 | Refills: 0 | Status: ACTIVE | COMMUNITY
Start: 2025-09-11 | End: 1900-01-01

## (undated) DEVICE — ELCTR ECG CONDUCTIVE ADHESIVE

## (undated) DEVICE — BIOPSY FORCEP RADIAL JAW 4 STANDARD WITH NEEDLE

## (undated) DEVICE — BITE BLOCK ADULT 20 X 27MM (GREEN)

## (undated) DEVICE — TUBING SUCTION NONCONDUCTIVE 6MM X 12FT

## (undated) DEVICE — DRSG BANDAID 0.75X3"

## (undated) DEVICE — FACESHIELD FULL VISOR

## (undated) DEVICE — BASIN EMESIS 10IN GRADUATED MAUVE

## (undated) DEVICE — SALIVA EJECTOR (BLUE)

## (undated) DEVICE — LINE BREATHE SAMPLNG

## (undated) DEVICE — DRSG 2X2

## (undated) DEVICE — BIOPSY FORCEP COLD DISP

## (undated) DEVICE — CLAMP BX HOT RAD JAW 3

## (undated) DEVICE — CONTAINER FORMALIN 80ML YELLOW

## (undated) DEVICE — GOWN LG

## (undated) DEVICE — UNDERPAD LINEN SAVER 17 X 24"

## (undated) DEVICE — CATH IV SAFE BC 22G X 1" (BLUE)

## (undated) DEVICE — LUBRICATING JELLY HR ONE SHOT 3G

## (undated) DEVICE — PACK IV START WITH CHG

## (undated) DEVICE — ELCTR GROUNDING PAD ADULT COVIDIEN

## (undated) DEVICE — DENTURE CUP PINK

## (undated) DEVICE — FORMALIN CUPS 10% BUFFERED

## (undated) DEVICE — TUBING MEDI-VAC W MAXIGRIP CONNECTORS 1/4"X6'

## (undated) DEVICE — DRSG CURITY GAUZE SPONGE 4 X 4" 12-PLY NON-STERILE

## (undated) DEVICE — TUBING IV SET GRAVITY 3Y 100" MACRO